# Patient Record
Sex: FEMALE | Race: WHITE | NOT HISPANIC OR LATINO | Employment: FULL TIME | URBAN - METROPOLITAN AREA
[De-identification: names, ages, dates, MRNs, and addresses within clinical notes are randomized per-mention and may not be internally consistent; named-entity substitution may affect disease eponyms.]

---

## 2017-02-28 ENCOUNTER — TELEPHONE (OUTPATIENT)
Dept: PSYCHIATRY | Facility: CLINIC | Age: 27
End: 2017-02-28

## 2017-02-28 ENCOUNTER — MYC MEDICAL ADVICE (OUTPATIENT)
Dept: PSYCHIATRY | Facility: CLINIC | Age: 27
End: 2017-02-28

## 2017-02-28 NOTE — TELEPHONE ENCOUNTER
----- Message from Trinidad Fuchs sent at 2/28/2017 12:09 PM CST -----  Regarding: Wes/Claudette York from [employer] is calling.  Ph: [phone]    The pt would like to fill her Adderall early as she's leaving [facility] tomorrow for a trip. The pharmacy needs authorization to do so.

## 2017-02-28 NOTE — TELEPHONE ENCOUNTER
-See MyChart msg from pt  -Called pharmacy giving authorization to RF Adderall XR today.  -Pt notified via [employer].

## 2017-02-28 NOTE — TELEPHONE ENCOUNTER
Per [place] :   Adderall XR 20 mg daily #30 - last filled 2/7/17  Adderall IR 10 mg daily #30 - last filled 2/2/17    -Called pt to find out when she is leaving out of town.  -No answer at home number and vm is left requesting c/b regarding early med RF.

## 2017-03-27 ENCOUNTER — MYC MEDICAL ADVICE (OUTPATIENT)
Dept: PSYCHIATRY | Facility: CLINIC | Age: 27
End: 2017-03-27

## 2017-03-27 DIAGNOSIS — F98.8 ADD (ATTENTION DEFICIT DISORDER): ICD-10-CM

## 2017-03-27 DIAGNOSIS — F33.1 MAJOR DEPRESSIVE DISORDER, RECURRENT EPISODE, MODERATE (H): ICD-10-CM

## 2017-03-27 NOTE — TELEPHONE ENCOUNTER
Last seen: 12/27/16  RTC: 3 months  Cancel: none  No-show: none  Next appt: not scheduled    Incoming refill from pt via [employer]    Medication requested:   Adderall 10 mg daily #30 - last filled 2/2/17 & 2/28/17 (pt would take last pill on 4/2/17)  Adderall XR 20 mg daily #30 - last filled 2/7/17 & 2/28/17 (pt would take last pill on 4/7/17)    Msg sent to pt regarding scheduling an appt either tomorrow or next Tue with Dr. Wilkins  Will process requests depending on how soon pt can be seen for appt.

## 2017-03-28 RX ORDER — DEXTROAMPHETAMINE SACCHARATE, AMPHETAMINE ASPARTATE MONOHYDRATE, DEXTROAMPHETAMINE SULFATE AND AMPHETAMINE SULFATE 5; 5; 5; 5 MG/1; MG/1; MG/1; MG/1
20 CAPSULE, EXTENDED RELEASE ORAL DAILY
Qty: 30 CAPSULE | Refills: 0 | Status: SHIPPED | OUTPATIENT
Start: 2017-03-28 | End: 2017-05-17

## 2017-03-28 RX ORDER — DEXTROAMPHETAMINE SACCHARATE, AMPHETAMINE ASPARTATE, DEXTROAMPHETAMINE SULFATE AND AMPHETAMINE SULFATE 2.5; 2.5; 2.5; 2.5 MG/1; MG/1; MG/1; MG/1
TABLET ORAL
Qty: 30 TABLET | Refills: 0 | Status: SHIPPED | OUTPATIENT
Start: 2017-03-28 | End: 2017-04-11

## 2017-03-28 NOTE — TELEPHONE ENCOUNTER
Appt scheduled for 4/11/17 at 1:00 pm    As pt would need refill of both meds before then, scripts are printed and placed in Dr. Wilkins's folder for signature.

## 2017-03-28 NOTE — TELEPHONE ENCOUNTER
Scripts signed by Dr. Wilkins  Pt notified via [employer]  Requested pt confirm how she would like to receive scripts

## 2017-04-11 ENCOUNTER — OFFICE VISIT (OUTPATIENT)
Dept: PSYCHIATRY | Facility: CLINIC | Age: 27
End: 2017-04-11

## 2017-04-11 VITALS — HEART RATE: 93 BPM | DIASTOLIC BLOOD PRESSURE: 78 MMHG | SYSTOLIC BLOOD PRESSURE: 138 MMHG

## 2017-04-11 DIAGNOSIS — F98.8 ADD (ATTENTION DEFICIT DISORDER): ICD-10-CM

## 2017-04-11 DIAGNOSIS — F33.1 MAJOR DEPRESSIVE DISORDER, RECURRENT EPISODE, MODERATE (H): ICD-10-CM

## 2017-04-11 DIAGNOSIS — F98.8 ATTENTION DEFICIT DISORDER OF ADULT: Primary | ICD-10-CM

## 2017-04-11 RX ORDER — DULOXETIN HYDROCHLORIDE 60 MG/1
60 CAPSULE, DELAYED RELEASE ORAL DAILY
Qty: 30 CAPSULE | Refills: 3 | Status: SHIPPED | OUTPATIENT
Start: 2017-04-11 | End: 2017-08-03

## 2017-04-11 RX ORDER — DEXTROAMPHETAMINE SACCHARATE, AMPHETAMINE ASPARTATE, DEXTROAMPHETAMINE SULFATE AND AMPHETAMINE SULFATE 5; 5; 5; 5 MG/1; MG/1; MG/1; MG/1
TABLET ORAL
Qty: 30 TABLET | Refills: 0 | Status: SHIPPED | OUTPATIENT
Start: 2017-04-11 | End: 2017-05-17

## 2017-04-11 NOTE — MR AVS SNAPSHOT
After Visit Summary   4/11/2017    Saskia Ortega    MRN: [id number]           Patient Information     Date Of Birth          1990        Visit Information        Provider Department      4/11/2017 1:30 PM Yessica Wilkins MD [facility]        Today's Diagnoses     Attention deficit disorder of adult    -  1    ADD (attention deficit disorder)        Major depressive disorder, recurrent episode, moderate (H)           Follow-ups after your visit        Follow-up notes from your care team     Return in about 4 weeks (around 5/9/2017).      Your next 10 appointments already scheduled     May 16, 2017  1:00 PM CDT   Adult Med Follow UP with Yessica Wilkins MD   [facility] ([facility])    [address] [id number] [phone]              Who to contact     Please call your clinic at [phone] to:    Ask questions about your health    Make or cancel appointments    Discuss your medicines    Learn about your test results    Speak to your doctor   If you have compliments or concerns about an experience at your clinic, or if you wish to file a complaint, please contact [facility] Patient Relations at [phone] or email us at [email or web address]         Additional Information About Your Visit        MyChart Information     [employer] gives you secure access to your electronic health record. If you see a primary care provider, you can also send messages to your care team and make appointments. If you have questions, please call your primary care clinic.  If you do not have a primary care provider, please call [phone] and they will assist you.      [employer] is an electronic gateway that provides easy, online access to your medical records. With [employer], you can request a clinic appointment, read your test results, renew a prescription or communicate with your care team.     To access your  existing account, please contact your [facility] or call [phone] for assistance.        Care EveryWhere ID     This is your Care EveryWhere ID. This could be used by other organizations to access your [place] medical records  [id number]        Your Vitals Were     Pulse Breastfeeding?                93 No           Blood Pressure from Last 3 Encounters:   04/11/17 138/78   11/23/15 112/78   05/20/14 112/86    Weight from Last 3 Encounters:   11/23/15 135 lb (61.2 kg)   05/20/14 120 lb (54.4 kg)   10/29/13 115 lb 8 oz (52.4 kg)              Today, you had the following     No orders found for display         Today's Medication Changes          These changes are accurate as of: 4/11/17 11:59 PM.  If you have any questions, ask your nurse or doctor.               These medicines have changed or have updated prescriptions.        Dose/Directions    * amphetamine-dextroamphetamine 20 MG per 24 hr capsule   Commonly known as:  ADDERALL XR   This may have changed:    - Another medication with the same name was added. Make sure you understand how and when to take each.  - Another medication with the same name was removed. Continue taking this medication, and follow the directions you see here.   Used for:  Major depressive disorder, recurrent episode, moderate (H)   Changed by:  Yessica Wilkins MD        Dose:  20 mg   Take 1 capsule (20 mg) by mouth daily   Quantity:  30 capsule   Refills:  0       * amphetamine-dextroamphetamine 20 MG per tablet   Commonly known as:  ADDERALL   This may have changed:  You were already taking a medication with the same name, and this prescription was added. Make sure you understand how and when to take each.   Used for:  ADD (attention deficit disorder)   Replaces:  amphetamine-dextroamphetamine 10 MG per tablet   Changed by:  Yessica Wilkins MD        Take 1 tab (10 mg) every afternoon.   Quantity:  30 tablet   Refills:  0       *  Notice:  This list has 2 medication(s) that are the same as other medications prescribed for you. Read the directions carefully, and ask your doctor or other care provider to review them with you.      Stop taking these medicines if you haven't already. Please contact your care team if you have questions.     amphetamine-dextroamphetamine 10 MG per tablet   Commonly known as:  ADDERALL   Replaced by:  amphetamine-dextroamphetamine 20 MG per tablet   You also have another medication with the same name that you need to continue taking as instructed.   Stopped by:  Yessica Wilkins MD                Where to get your medicines      These medications were sent to [facility] [id number] [facility] - [place], [place] - [address]  [address], [facility] [address]     Phone:  [phone]     DULoxetine 60 MG EC capsule         Some of these will need a paper prescription and others can be bought over the counter.  Ask your nurse if you have questions.     Bring a paper prescription for each of these medications     amphetamine-dextroamphetamine 20 MG per tablet                Primary Care Provider Office Phone # Fax #    Miriam Kimball [phone]       [facility] [place] [address]  [facility] [address]        Thank you!     Thank you for choosing [facility]  for your care. Our goal is always to provide you with excellent care. Hearing back from our patients is one way we can continue to improve our services. Please take a few minutes to complete the written survey that you may receive in the mail after your visit with us. Thank you!             Your Updated Medication List - Protect others around you: Learn how to safely use, store and throw away your medicines at www.disposemymeds.org.          This list is accurate as of: 4/11/17 11:59 PM.  Always use your most recent med list.                   Brand Name Dispense Instructions for use    * amphetamine-dextroamphetamine 20 MG per 24  hr capsule    ADDERALL XR    30 capsule    Take 1 capsule (20 mg) by mouth daily       * amphetamine-dextroamphetamine 20 MG per tablet    ADDERALL    30 tablet    Take 1 tab (10 mg) every afternoon.       DULoxetine 60 MG EC capsule    CYMBALTA    30 capsule    Take 1 capsule (60 mg) by mouth daily       LORazepam 0.5 MG tablet    ATIVAN    30 tablet    Take 1 tablet by mouth daily as needed for anxiety.       * Notice:  This list has 2 medication(s) that are the same as other medications prescribed for you. Read the directions carefully, and ask your doctor or other care provider to review them with you.

## 2017-04-11 NOTE — PROGRESS NOTES
PSYCHIATRY CLINIC PROGRESS NOTE  30 minute medication management    IDENTIFICATION:  Saskia Ortega is a 26 year old female with previous psychiatric diagnoses of major depressive disorder, recurrent, generalized anxiety disorder, and ADHD.  Patient presents for ongoing psychiatric follow-up and was seen for initial evaluation on 5/04/2012.    SUBJECTIVE:  The patient was last seen in clinic on 12/27/2016 at which time no medication changes were made.  Since the time of the last visit:     Pt reports that she has been doing well since she was last seen.    She states that she was in a wedding in [place] as well as recently on a vacation to [place]. She reports that she plans to begin looking for a job soon.    She statse taht she recently noticed that her feeling that Adderall 10 mg in the middle of the day is not working as effectively.    Discussed whether she has been feeling more anxious. 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    She describes not taking her Adderall regularly over the past month. Discussed possibility that ineffectiveness of 10 mg in the afternoon may be due to lack of XR being at steady state. Discussed option of having her continue on current regiment for a week with her taking it regularly vs. Increased afternoon dose. Opted to increased afternoon dose to 20 mg.    Other than worsened inattention, she reports mood has been stable and that anxiety has been manageable. Denies sleep disturbance.    Symptoms:  Worsened inattention in the afternoon. Denies sleep disruption. Ongoing infrequent low mood, anhedonia, fatigue, and sleep disturbance.  Denies  negative self concept, appetite disturbance, psychomotor slowing or concentration problems.  Denies suicidal ideation.  Ongoing periodic anxiety.   Medication side-effects:  Previously experienced fatigue, weight gain, and loss of libido associated with sertraline as well as fatigue  "and amotivation with higher dose citalopram.  Medical ROS:     Cardiovascular: negative for palpitations, tachycardia  Gastrointestinal: negative for increased appetite; negative for nausea, vomiting, constipation and diarrhea  Neurologic: negative for headaches and cognitive problems  Psychiatric: positive for sleep disturbance, increased stress, feeling anxious, negative for thoughts of self-harm, agitation and sexual difficulties.    MEDICAL TEAM:         - Primary Medical Provider:  unknown  - Therapist: none currently (previously followed by this provider for supportive psychotherapy)    ALLERGIES: NKDA    MEDICATIONS:  Adderall XR 20 mg daily  Adderall IR 10 mg qAfternoon  Duloxetine 60 mg daily   Lorazepam 0.5 mg daily PRN anxiety/sleep    LABS: no new results  VITALS: /78  Pulse 93  Breastfeeding? No    OBJECTIVE:   Alert and oriented.  Well groomed, calm, cooperative with good eye contact.  No problems with speech or psychomotor behavior.  Mood was described as \"good.\" Affect was euthymic with significantly improved brightness, congruent to speech content. Thought process was unremarkable and thought content was congruent to speech content, and devoid of suicidal and homicidal ideation and psychotic thought.  No hallucinations.  Insight was good.  Judgment was intact and adequate for safety.  Patient demonstrates no obvious problems with attention, concentration, language, short or long term memory by observation of conversation.  These were not formally tested.  Fund of knowledge was intact.    ASSESSMENT:    Historical:  Saskia Ortega is a 25 year old  female with history of depression and anxiety who presents for follow-up medication management. She was transitioned from Effexor to Celexa in spring/summer 2013 with good results.  She was previously seen for monthly combination medication management and psychotherapy by her last provider.  She describes obtaining benefit from this and " requested to increase frequency of sessions to every other week to work on processing family issues which have become more apparent after beginning a new relationship.  She continued to find benefit from Adderall for ADD and lorazepam as PRN for anxiety. She took them as prescribed and had no issues with substance abuse.  Pt has a history trials of Effexor and citalopram which were both effective for managing depression but discontinued secondary to side effects.  In addition, trial of Vyvanse was attempted during fall 2014, however, pt did not find it as effective as Adderall for management of ADD sx and so was transitioned back to Adderall.  At January 2015 visit, pt described significant worsening of sx of depression and cross-titration from citalopram to sertraline was initiated.  She tolerated transition well and initially felt mood was well managed at 150 mg daily.    Current:  Today, pt reports feeling that ADHD symptoms have recently been worse in the afternoon with increased distractibility, difficulty staying on task and with intrusive interrupting of people during conversation. After discussing possible contribution of anxiety, opted to increase afternoon dose of IR Adderall to 20 mg. Otherwise feels mood and anxiety are currently well managed.    For the future, may consider increasing dose of duloxetine to 90 mg vs trial of escitalopram and/or bupropion.    The risks, benefits, alternatives and potential adverse effects have been explained and are understood by the patient.  The patient agrees to the plan with the capacity to do so.  The patient knows to call the clinic for any problems or access emergency care if needed. The patient is not pregnant.  She is not abusing substances and shows no evidence for abuse of medication.  Controlled substances are still appropriate and required.  No medical contraindications to treatment.    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   -- Continue duloxetine 60 mg daily.  Refills x 3 months provided today (4/11/2017).  -- Continue lorazepam 0.5 mg daily PRN anxiety/insomnia.  Refills x 1 month provided (12/27/2016).  -- Continue Adderall XR 20 mg daily.  Refills x 3 months (12/27/2016).  Psychotherapy:   Will continue with monthly medication management for the time being.   RTC:  1 months for U  Labs/Monitoring:    -- Recommend pt's weight NOT be checked prior to appointments.  -- Continue to monitor BP while on Cymbalta  Psychotherapy treatment plan review date: not currently indicated.      MITCHEL Wilkins MD

## 2017-04-13 ASSESSMENT — PATIENT HEALTH QUESTIONNAIRE - PHQ9: SUM OF ALL RESPONSES TO PHQ QUESTIONS 1-9: 2

## 2017-05-17 ENCOUNTER — MYC REFILL (OUTPATIENT)
Dept: PSYCHIATRY | Facility: CLINIC | Age: 27
End: 2017-05-17

## 2017-05-17 DIAGNOSIS — F33.1 MAJOR DEPRESSIVE DISORDER, RECURRENT EPISODE, MODERATE (H): ICD-10-CM

## 2017-05-17 DIAGNOSIS — F98.8 ADD (ATTENTION DEFICIT DISORDER): ICD-10-CM

## 2017-05-17 RX ORDER — DEXTROAMPHETAMINE SACCHARATE, AMPHETAMINE ASPARTATE, DEXTROAMPHETAMINE SULFATE AND AMPHETAMINE SULFATE 5; 5; 5; 5 MG/1; MG/1; MG/1; MG/1
TABLET ORAL
Qty: 30 TABLET | Refills: 0 | Status: SHIPPED | OUTPATIENT
Start: 2017-05-17 | End: 2017-06-26

## 2017-05-17 RX ORDER — DEXTROAMPHETAMINE SACCHARATE, AMPHETAMINE ASPARTATE MONOHYDRATE, DEXTROAMPHETAMINE SULFATE AND AMPHETAMINE SULFATE 5; 5; 5; 5 MG/1; MG/1; MG/1; MG/1
20 CAPSULE, EXTENDED RELEASE ORAL DAILY
Qty: 30 CAPSULE | Refills: 0 | Status: SHIPPED | OUTPATIENT
Start: 2017-05-17 | End: 2017-06-26

## 2017-06-23 ENCOUNTER — MYC REFILL (OUTPATIENT)
Dept: PSYCHIATRY | Facility: CLINIC | Age: 27
End: 2017-06-23

## 2017-06-23 DIAGNOSIS — F98.8 ADD (ATTENTION DEFICIT DISORDER): ICD-10-CM

## 2017-06-23 DIAGNOSIS — F33.1 MAJOR DEPRESSIVE DISORDER, RECURRENT EPISODE, MODERATE (H): ICD-10-CM

## 2017-06-26 ENCOUNTER — MYC REFILL (OUTPATIENT)
Dept: PSYCHIATRY | Facility: CLINIC | Age: 27
End: 2017-06-26

## 2017-06-26 DIAGNOSIS — F98.8 ADD (ATTENTION DEFICIT DISORDER): ICD-10-CM

## 2017-06-26 DIAGNOSIS — F33.1 MAJOR DEPRESSIVE DISORDER, RECURRENT EPISODE, MODERATE (H): ICD-10-CM

## 2017-06-26 DIAGNOSIS — F90.8 ATTENTION DEFICIT HYPERACTIVITY DISORDER (ADHD), OTHER TYPE: Primary | ICD-10-CM

## 2017-06-26 RX ORDER — DEXTROAMPHETAMINE SACCHARATE, AMPHETAMINE ASPARTATE MONOHYDRATE, DEXTROAMPHETAMINE SULFATE AND AMPHETAMINE SULFATE 5; 5; 5; 5 MG/1; MG/1; MG/1; MG/1
20 CAPSULE, EXTENDED RELEASE ORAL EVERY MORNING
Qty: 30 CAPSULE | Refills: 0 | Status: SHIPPED | OUTPATIENT
Start: 2017-06-26 | End: 2017-12-15

## 2017-06-26 RX ORDER — DEXTROAMPHETAMINE SACCHARATE, AMPHETAMINE ASPARTATE, DEXTROAMPHETAMINE SULFATE AND AMPHETAMINE SULFATE 5; 5; 5; 5 MG/1; MG/1; MG/1; MG/1
TABLET ORAL
Qty: 30 TABLET | Refills: 0 | Status: SHIPPED | OUTPATIENT
Start: 2017-06-26 | End: 2017-12-15

## 2017-06-26 RX ORDER — DEXTROAMPHETAMINE SACCHARATE, AMPHETAMINE ASPARTATE MONOHYDRATE, DEXTROAMPHETAMINE SULFATE AND AMPHETAMINE SULFATE 5; 5; 5; 5 MG/1; MG/1; MG/1; MG/1
20 CAPSULE, EXTENDED RELEASE ORAL DAILY
Qty: 30 CAPSULE | Refills: 0 | OUTPATIENT
Start: 2017-06-26

## 2017-06-26 RX ORDER — DEXTROAMPHETAMINE SACCHARATE, AMPHETAMINE ASPARTATE, DEXTROAMPHETAMINE SULFATE AND AMPHETAMINE SULFATE 5; 5; 5; 5 MG/1; MG/1; MG/1; MG/1
TABLET ORAL
Qty: 30 TABLET | Refills: 0 | OUTPATIENT
Start: 2017-06-26

## 2017-06-26 NOTE — TELEPHONE ENCOUNTER
----- Message from Tyrone Mixon CMA sent at 6/26/2017  9:02 AM CDT -----  Regarding: refill, Dr Wilkins pt  Patient needs a refill on both   amphetamine-dextroamphetamine (ADDERALL XR) 20 MG per 24 hr capsule  AND  amphetamine-dextroamphetamine (ADDERALL) 20 MG per tablet    Pharmacy:    [facility] [id number] [facility] - [place], [place] - [address]     RTC date: 7/11/17

## 2017-06-26 NOTE — TELEPHONE ENCOUNTER
Message from [place]:  Dana Armijo RN Fri Jun 23, 2017 8:21 AM        ----- Message -----   From: Saskia Ortega   Sent: 6/23/2017 4:57 AM   To: [facility]iat[facility]  Subject: Medication Renewal Request     Original authorizing provider: MD Saskia Mcmanus would like a refill of the following medications:  amphetamine-dextroamphetamine (ADDERALL XR) 20 MG per 24 hr capsule [Yessica Wilkins MD]  amphetamine-dextroamphetamine (ADDERALL) 20 MG per tablet [Yessica Wilkins MD]    Preferred pharmacy: [facility] [id number] [place] [address]    Comment:

## 2017-07-11 ENCOUNTER — OFFICE VISIT (OUTPATIENT)
Dept: PSYCHIATRY | Facility: CLINIC | Age: 27
End: 2017-07-11

## 2017-07-11 VITALS — HEART RATE: 89 BPM | DIASTOLIC BLOOD PRESSURE: 79 MMHG | SYSTOLIC BLOOD PRESSURE: 129 MMHG | HEIGHT: 66 IN

## 2017-07-11 DIAGNOSIS — F33.1 MAJOR DEPRESSIVE DISORDER, RECURRENT EPISODE, MODERATE (H): ICD-10-CM

## 2017-07-11 DIAGNOSIS — F90.0 ATTENTION DEFICIT HYPERACTIVITY DISORDER (ADHD), PREDOMINANTLY INATTENTIVE TYPE: ICD-10-CM

## 2017-07-11 DIAGNOSIS — F90.0 ATTENTION-DEFICIT HYPERACTIVITY DISORDER, PREDOMINANTLY INATTENTIVE TYPE: Primary | ICD-10-CM

## 2017-07-11 RX ORDER — DEXTROAMPHETAMINE SACCHARATE, AMPHETAMINE ASPARTATE MONOHYDRATE, DEXTROAMPHETAMINE SULFATE AND AMPHETAMINE SULFATE 5; 5; 5; 5 MG/1; MG/1; MG/1; MG/1
CAPSULE, EXTENDED RELEASE ORAL
Qty: 30 CAPSULE | Refills: 0 | Status: SHIPPED | OUTPATIENT
Start: 2017-07-11 | End: 2017-07-11

## 2017-07-11 RX ORDER — DEXTROAMPHETAMINE SACCHARATE, AMPHETAMINE ASPARTATE, DEXTROAMPHETAMINE SULFATE AND AMPHETAMINE SULFATE 5; 5; 5; 5 MG/1; MG/1; MG/1; MG/1
TABLET ORAL
Qty: 30 TABLET | Refills: 0 | Status: SHIPPED | OUTPATIENT
Start: 2017-09-09 | End: 2018-07-10

## 2017-07-11 RX ORDER — DEXTROAMPHETAMINE SACCHARATE, AMPHETAMINE ASPARTATE, DEXTROAMPHETAMINE SULFATE AND AMPHETAMINE SULFATE 5; 5; 5; 5 MG/1; MG/1; MG/1; MG/1
TABLET ORAL
Qty: 30 TABLET | Refills: 0 | Status: SHIPPED | OUTPATIENT
Start: 2017-08-10 | End: 2017-12-15

## 2017-07-11 RX ORDER — DEXTROAMPHETAMINE SACCHARATE, AMPHETAMINE ASPARTATE MONOHYDRATE, DEXTROAMPHETAMINE SULFATE AND AMPHETAMINE SULFATE 5; 5; 5; 5 MG/1; MG/1; MG/1; MG/1
CAPSULE, EXTENDED RELEASE ORAL
Qty: 30 CAPSULE | Refills: 0 | Status: SHIPPED | OUTPATIENT
Start: 2017-08-10 | End: 2017-12-15

## 2017-07-11 RX ORDER — DEXTROAMPHETAMINE SACCHARATE, AMPHETAMINE ASPARTATE, DEXTROAMPHETAMINE SULFATE AND AMPHETAMINE SULFATE 5; 5; 5; 5 MG/1; MG/1; MG/1; MG/1
TABLET ORAL
Qty: 30 TABLET | Refills: 0 | Status: SHIPPED | OUTPATIENT
Start: 2017-07-11 | End: 2017-07-11

## 2017-07-11 RX ORDER — DEXTROAMPHETAMINE SACCHARATE, AMPHETAMINE ASPARTATE, DEXTROAMPHETAMINE SULFATE AND AMPHETAMINE SULFATE 5; 5; 5; 5 MG/1; MG/1; MG/1; MG/1
TABLET ORAL
Qty: 30 TABLET | Refills: 0 | Status: SHIPPED | OUTPATIENT
Start: 2017-07-11 | End: 2017-12-15

## 2017-07-11 RX ORDER — DEXTROAMPHETAMINE SACCHARATE, AMPHETAMINE ASPARTATE MONOHYDRATE, DEXTROAMPHETAMINE SULFATE AND AMPHETAMINE SULFATE 5; 5; 5; 5 MG/1; MG/1; MG/1; MG/1
CAPSULE, EXTENDED RELEASE ORAL
Qty: 30 CAPSULE | Refills: 0 | Status: SHIPPED | OUTPATIENT
Start: 2017-07-11 | End: 2017-12-15

## 2017-07-11 RX ORDER — DEXTROAMPHETAMINE SACCHARATE, AMPHETAMINE ASPARTATE MONOHYDRATE, DEXTROAMPHETAMINE SULFATE AND AMPHETAMINE SULFATE 5; 5; 5; 5 MG/1; MG/1; MG/1; MG/1
CAPSULE, EXTENDED RELEASE ORAL
Qty: 30 CAPSULE | Refills: 0 | Status: SHIPPED | OUTPATIENT
Start: 2017-09-09 | End: 2017-12-15

## 2017-07-11 RX ORDER — DEXTROAMPHETAMINE SACCHARATE, AMPHETAMINE ASPARTATE, DEXTROAMPHETAMINE SULFATE AND AMPHETAMINE SULFATE 5; 5; 5; 5 MG/1; MG/1; MG/1; MG/1
TABLET ORAL
Qty: 30 TABLET | Refills: 0 | Status: SHIPPED | OUTPATIENT
Start: 2017-09-09 | End: 2017-07-11

## 2017-07-11 NOTE — MR AVS SNAPSHOT
After Visit Summary   7/11/2017    Saskia Ortega    MRN: [id number]           Patient Information     Date Of Birth          1990        Visit Information        Provider Department      7/11/2017 3:30 PM Yessica Wilkins MD [facility]        Today's Diagnoses     Attention-deficit hyperactivity disorder, predominantly inattentive type    -  1       Follow-ups after your visit        Follow-up notes from your care team     Return in 3 months (on 10/11/2017) for ADHD MED RECHECK (3 MONTHS).      Your next 10 appointments already scheduled     Oct 17, 2017  1:00 PM CDT   Adult Med Follow UP with Yessica Wilkins MD   [facility] ([facility])    [address] [address] [phone]              Who to contact     Please call your clinic at [phone] to:    Ask questions about your health    Make or cancel appointments    Discuss your medicines    Learn about your test results    Speak to your doctor   If you have compliments or concerns about an experience at your clinic, or if you wish to file a complaint, please contact [facility] Patient Relations at [phone] or email us at [email or web address]         Additional Information About Your Visit        MyChart Information     [employer] gives you secure access to your electronic health record. If you see a primary care provider, you can also send messages to your care team and make appointments. If you have questions, please call your primary care clinic.  If you do not have a primary care provider, please call [phone] and they will assist you.      [employer] is an electronic gateway that provides easy, online access to your medical records. With [employer], you can request a clinic appointment, read your test results, renew a prescription or communicate with your care team.     To access your existing account, please contact your [place]  [facility] [place] [facility] or call [phone] for assistance.        Care EveryWhere ID     This is your Care EveryWhere ID. This could be used by other organizations to access your [place] medical records  [id number]        Your Vitals Were     Pulse Height Breastfeeding?             89 5' 6\" (167.6 cm) No          Blood Pressure from Last 3 Encounters:   07/11/17 129/79   04/11/17 138/78   11/23/15 112/78    Weight from Last 3 Encounters:   11/23/15 135 lb (61.2 kg)   05/20/14 120 lb (54.4 kg)   10/29/13 115 lb 8 oz (52.4 kg)              Today, you had the following     No orders found for display         Today's Medication Changes          These changes are accurate as of: 7/11/17  4:12 PM.  If you have any questions, ask your nurse or doctor.               These medicines have changed or have updated prescriptions.        Dose/Directions    * amphetamine-dextroamphetamine 20 MG per 24 hr capsule   Commonly known as:  ADDERALL XR   This may have changed:  Another medication with the same name was added. Make sure you understand how and when to take each.   Used for:  Major depressive disorder, recurrent episode, moderate (H), Attention deficit hyperactivity disorder (ADHD), other type   Changed by:  Yessica Wilkins MD        Dose:  20 mg   Take 1 capsule (20 mg) by mouth every morning   Quantity:  30 capsule   Refills:  0       * amphetamine-dextroamphetamine 20 MG per tablet   Commonly known as:  ADDERALL   This may have changed:  Another medication with the same name was added. Make sure you understand how and when to take each.   Used for:  Major depressive disorder, recurrent episode, moderate (H), Attention deficit hyperactivity disorder (ADHD), other type   Changed by:  Yessica Wilkins MD        Take 1 tab (20 mg) every afternoon.   Quantity:  30 tablet   Refills:  0       * amphetamine-dextroamphetamine 20 MG per 24 hr capsule   Commonly known as:  ADDERALL XR   This may have " changed:  You were already taking a medication with the same name, and this prescription was added. Make sure you understand how and when to take each.   Used for:  Attention-deficit hyperactivity disorder, predominantly inattentive type   Changed by:  Yessica Wilkins MD        Take 1 tab (20 mg) every morning.   Quantity:  30 capsule   Refills:  0       * amphetamine-dextroamphetamine 20 MG per 24 hr capsule   Commonly known as:  ADDERALL XR   This may have changed:  You were already taking a medication with the same name, and this prescription was added. Make sure you understand how and when to take each.   Used for:  Attention-deficit hyperactivity disorder, predominantly inattentive type   Changed by:  Yessica Wilkins MD        Take 1 tab (20 mg) every morning.   Quantity:  30 capsule   Refills:  0       * amphetamine-dextroamphetamine 20 MG per 24 hr capsule   Commonly known as:  ADDERALL XR   This may have changed:  You were already taking a medication with the same name, and this prescription was added. Make sure you understand how and when to take each.   Used for:  Attention-deficit hyperactivity disorder, predominantly inattentive type   Changed by:  Yessica Wilkins MD        Take 1 tab (20 mg) every morning.   Quantity:  30 capsule   Refills:  0       * amphetamine-dextroamphetamine 20 MG per tablet   Commonly known as:  ADDERALL   This may have changed:  You were already taking a medication with the same name, and this prescription was added. Make sure you understand how and when to take each.   Used for:  Attention-deficit hyperactivity disorder, predominantly inattentive type   Changed by:  Yessica Wilkins MD        Take 1 tab (20 mg) every afternoon.   Quantity:  30 tablet   Refills:  0       * amphetamine-dextroamphetamine 20 MG per tablet   Commonly known as:  ADDERALL   This may have changed:  You were already taking a medication with the same name, and this  prescription was added. Make sure you understand how and when to take each.   Used for:  Attention-deficit hyperactivity disorder, predominantly inattentive type   Changed by:  Yessica Wilkins MD        Take 1 tab (20 mg) every afternoon.   Quantity:  30 tablet   Refills:  0       * amphetamine-dextroamphetamine 20 MG per tablet   Commonly known as:  ADDERALL   This may have changed:  You were already taking a medication with the same name, and this prescription was added. Make sure you understand how and when to take each.   Used for:  Attention-deficit hyperactivity disorder, predominantly inattentive type   Changed by:  Yessica Wilkins MD        Take 1 tab (20 mg) every afternoon.   Quantity:  30 tablet   Refills:  0       * Notice:  This list has 8 medication(s) that are the same as other medications prescribed for you. Read the directions carefully, and ask your doctor or other care provider to review them with you.         Where to get your medicines      Some of these will need a paper prescription and others can be bought over the counter.  Ask your nurse if you have questions.     Bring a paper prescription for each of these medications     amphetamine-dextroamphetamine 20 MG per 24 hr capsule    amphetamine-dextroamphetamine 20 MG per 24 hr capsule    amphetamine-dextroamphetamine 20 MG per 24 hr capsule    amphetamine-dextroamphetamine 20 MG per tablet    amphetamine-dextroamphetamine 20 MG per tablet    amphetamine-dextroamphetamine 20 MG per tablet                Primary Care Provider Office Phone # Fax #    Miriam Kimball [phone]       [facility] [address] [facility] [address]  [facility] [address]        Equal Access to Services     [facility] AH: Hadii tal mariscalo Sochristiana, waaxda luqkathy, qaybta kaalmada cayla rowe. So [facility] [phone].    ATENCIÓN: Si habla español, tiene a espitia disposición servicios  fide de asistencia lingüística. Deep case [phone].    We comply with applicable federal civil rights laws and [place] laws. We do not discriminate on the basis of race, color, national origin, age, disability sex, sexual orientation or gender identity.            Thank you!     Thank you for choosing [facility]  for your care. Our goal is always to provide you with excellent care. Hearing back from our patients is one way we can continue to improve our services. Please take a few minutes to complete the written survey that you may receive in the mail after your visit with us. Thank you!             Your Updated Medication List - Protect others around you: Learn how to safely use, store and throw away your medicines at www.disposemymeds.org.          This list is accurate as of: 7/11/17  4:12 PM.  Always use your most recent med list.                   Brand Name Dispense Instructions for use Diagnosis    * amphetamine-dextroamphetamine 20 MG per 24 hr capsule    ADDERALL XR    30 capsule    Take 1 capsule (20 mg) by mouth every morning    Major depressive disorder, recurrent episode, moderate (H), Attention deficit hyperactivity disorder (ADHD), other type       * amphetamine-dextroamphetamine 20 MG per tablet    ADDERALL    30 tablet    Take 1 tab (20 mg) every afternoon.    Major depressive disorder, recurrent episode, moderate (H), Attention deficit hyperactivity disorder (ADHD), other type       * amphetamine-dextroamphetamine 20 MG per 24 hr capsule    ADDERALL XR    30 capsule    Take 1 tab (20 mg) every morning.    Attention-deficit hyperactivity disorder, predominantly inattentive type       * amphetamine-dextroamphetamine 20 MG per 24 hr capsule    ADDERALL XR    30 capsule    Take 1 tab (20 mg) every morning.    Attention-deficit hyperactivity disorder, predominantly inattentive type       * amphetamine-dextroamphetamine 20 MG per 24 hr capsule    ADDERALL XR    30 capsule    Take 1 tab (20  mg) every morning.    Attention-deficit hyperactivity disorder, predominantly inattentive type       * amphetamine-dextroamphetamine 20 MG per tablet    ADDERALL    30 tablet    Take 1 tab (20 mg) every afternoon.    Attention-deficit hyperactivity disorder, predominantly inattentive type       * amphetamine-dextroamphetamine 20 MG per tablet    ADDERALL    30 tablet    Take 1 tab (20 mg) every afternoon.    Attention-deficit hyperactivity disorder, predominantly inattentive type       * amphetamine-dextroamphetamine 20 MG per tablet    ADDERALL    30 tablet    Take 1 tab (20 mg) every afternoon.    Attention-deficit hyperactivity disorder, predominantly inattentive type       DULoxetine 60 MG EC capsule    CYMBALTA    30 capsule    Take 1 capsule (60 mg) by mouth daily    Major depressive disorder, recurrent episode, moderate (H)       LORazepam 0.5 MG tablet    ATIVAN    30 tablet    Take 1 tablet by mouth daily as needed for anxiety.    Major depressive disorder, recurrent episode, moderate (H)       * Notice:  This list has 8 medication(s) that are the same as other medications prescribed for you. Read the directions carefully, and ask your doctor or other care provider to review them with you.

## 2017-07-11 NOTE — PROGRESS NOTES
PSYCHIATRY CLINIC PROGRESS NOTE  30 minute medication management    IDENTIFICATION:  Saskia Ortega is a 26 year old female with previous psychiatric diagnoses of major depressive disorder, recurrent, generalized anxiety disorder, and ADHD.  Patient presents for ongoing psychiatric follow-up and was seen for initial evaluation on 5/04/2012.    SUBJECTIVE:  The patient was last seen in clinic on 12/27/2016 at which time no medication changes were made.  Since the time of the last visit:     Pt reports that she has been doing well since she was last seen.    She reports that she has an interview with an [employer] firm that she is excited about.    She states that she is currently trying to be selective about where she will accept works. 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    Reports that she has started taking her Adderall every day.    Expresses concern thatshe is currently depressed. Attributes this to not having regular work. Has plans to dedicate more time to finding a job that she finds fulfilling.    Although she expresses concern about low mood, she declines to change medications as she feels that low mood is attributable to psychosocial situtation.    Overall, relationship with boyfriend is good.    Denies other side effects to medication. Reports she is sleeping well.    Symptoms:  Denies inattention in the afternoon. Denies sleep disruption. Ongoing infrequent low mood, anhedonia, fatigue, and sleep disturbance.  Denies  negative self concept, appetite disturbance, psychomotor slowing or concentration problems.  Denies suicidal ideation.  Ongoing periodic anxiety.   Medication side-effects:  Previously experienced fatigue, weight gain, and loss of libido associated with sertraline as well as fatigue and amotivation with higher dose citalopram.  Medical ROS:     Cardiovascular: negative for palpitations,  "tachycardia  Gastrointestinal: negative for increased appetite; negative for nausea, vomiting, constipation and diarrhea  Neurologic: negative for headaches and cognitive problems  Psychiatric: positive for sleep disturbance, increased stress, feeling anxious, negative for thoughts of self-harm, agitation and sexual difficulties.    MEDICAL TEAM:         - Primary Medical Provider:  unknown  - Therapist: none currently (previously followed by this provider for supportive psychotherapy)    ALLERGIES: NKDA    MEDICATIONS:  Adderall XR 20 mg daily  Adderall IR 10 mg qAfternoon  Duloxetine 60 mg daily   Lorazepam 0.5 mg daily PRN anxiety/sleep    LABS: no new results  VITALS: /79 (BP Location: Left arm, Patient Position: Chair, Cuff Size: Adult Regular)  Pulse 89  Ht 5' 6\" (167.6 cm)  Breastfeeding? No    OBJECTIVE:   Alert and oriented.  Well groomed, calm, cooperative with good eye contact.  No problems with speech or psychomotor behavior.  Mood was described as \"good.\" Affect was euthymic with significantly improved brightness, congruent to speech content. Thought process was unremarkable and thought content was congruent to speech content, and devoid of suicidal and homicidal ideation and psychotic thought.  No hallucinations.  Insight was good.  Judgment was intact and adequate for safety.  Patient demonstrates no obvious problems with attention, concentration, language, short or long term memory by observation of conversation.  These were not formally tested.  Fund of knowledge was intact.    ASSESSMENT:    Historical:  Saskia Ortega is a 25 year old  female with history of depression and anxiety who presents for follow-up medication management. She was transitioned from Effexor to Celexa in spring/summer 2013 with good results.  She was previously seen for monthly combination medication management and psychotherapy by her last provider.  She describes obtaining benefit from this and requested to " increase frequency of sessions to every other week to work on processing family issues which have become more apparent after beginning a new relationship.  She continued to find benefit from Adderall for ADD and lorazepam as PRN for anxiety. She took them as prescribed and had no issues with substance abuse.  Pt has a history trials of Effexor and citalopram which were both effective for managing depression but discontinued secondary to side effects.  In addition, trial of Vyvanse was attempted during fall 2014, however, pt did not find it as effective as Adderall for management of ADD sx and so was transitioned back to Adderall.  At January 2015 visit, pt described significant worsening of sx of depression and cross-titration from citalopram to sertraline was initiated.  She tolerated transition well and initially felt mood was well managed at 150 mg daily.    Current:  Today, pt reports feeling that ADHD symptoms have recently been worse in the afternoon with increased distractibility, difficulty staying on task and with intrusive interrupting of people during conversation. After discussing possible contribution of anxiety, opted to increase afternoon dose of IR Adderall to 20 mg. Otherwise feels mood and anxiety are currently well managed.    For the future, may consider increasing dose of duloxetine to 90 mg vs trial of escitalopram and/or bupropion.    The risks, benefits, alternatives and potential adverse effects have been explained and are understood by the patient.  The patient agrees to the plan with the capacity to do so.  The patient knows to call the clinic for any problems or access emergency care if needed. The patient is not pregnant.  She is not abusing substances and shows no evidence for abuse of medication.  Controlled substances are still appropriate and required.  No medical contraindications to treatment.    DMS-5 DIAGNOSES:  Major depressive disorder, recurrent, moderate, in full remission  (F33.42)  Generalized anxiety disorder (F41.1)  Attention deficit disorder, predominantly inattentive presentation, mild (F90.0)  Anorexia Nervosa, mild, in partial remission (F50.01)     PLAN:  Medications:   -- Continue Adderall IR 20 mg qAfternoon.   -- Continue duloxetine 60 mg daily.  Refills x 3 months provided today (4/11/2017).  -- Continue lorazepam 0.5 mg daily PRN anxiety/insomnia.  Refills x 1 month provided (12/27/2016).  -- Continue Adderall XR 20 mg daily.  Refills x 3 months (12/27/2016).  Psychotherapy:   Will continue with monthly medication management for the time being.   RTC:  1 months for MFU  Labs/Monitoring:    -- Recommend pt's weight NOT be checked prior to appointments.  -- Continue to monitor BP while on Cymbalta  Psychotherapy treatment plan review date: not currently indicated.      MITCHEL Wilkins MD

## 2017-07-12 ASSESSMENT — PATIENT HEALTH QUESTIONNAIRE - PHQ9: SUM OF ALL RESPONSES TO PHQ QUESTIONS 1-9: 2

## 2017-08-03 ENCOUNTER — TELEPHONE (OUTPATIENT)
Dept: NEUROLOGY | Facility: CLINIC | Age: 27
End: 2017-08-03

## 2017-08-03 DIAGNOSIS — F33.1 MAJOR DEPRESSIVE DISORDER, RECURRENT EPISODE, MODERATE (H): ICD-10-CM

## 2017-08-03 RX ORDER — DULOXETIN HYDROCHLORIDE 60 MG/1
60 CAPSULE, DELAYED RELEASE ORAL DAILY
Qty: 90 CAPSULE | Refills: 0 | Status: SHIPPED | OUTPATIENT
Start: 2017-08-03 | End: 2017-10-31

## 2017-08-03 NOTE — TELEPHONE ENCOUNTER
----- Message from Tyrone Mixon CMA sent at 8/3/2017  8:09 AM CDT -----  Regarding: refill, Dr Wilkins pt  Patient needs a refill on DULoxetine (CYMBALTA) 60 MG EC capsule. Needs 90 days supply with refills. Changin to 90 day supply due to ins reasons.     Pharmacy: [id number] - [place], [place] - [address]      RTC date: 10/17/17, appt might be change due to provider being out

## 2017-10-31 ENCOUNTER — MYC REFILL (OUTPATIENT)
Dept: PSYCHIATRY | Facility: CLINIC | Age: 27
End: 2017-10-31

## 2017-10-31 DIAGNOSIS — F33.1 MAJOR DEPRESSIVE DISORDER, RECURRENT EPISODE, MODERATE (H): ICD-10-CM

## 2017-11-01 RX ORDER — DULOXETIN HYDROCHLORIDE 60 MG/1
60 CAPSULE, DELAYED RELEASE ORAL DAILY
Qty: 90 CAPSULE | Refills: 1 | Status: SHIPPED | OUTPATIENT
Start: 2017-11-01 | End: 2018-03-27

## 2017-11-12 ENCOUNTER — HEALTH MAINTENANCE LETTER (OUTPATIENT)
Age: 27
End: 2017-11-12

## 2017-12-12 ENCOUNTER — OFFICE VISIT (OUTPATIENT)
Dept: PSYCHIATRY | Facility: CLINIC | Age: 27
End: 2017-12-12

## 2017-12-12 VITALS — DIASTOLIC BLOOD PRESSURE: 76 MMHG | HEIGHT: 67 IN | HEART RATE: 89 BPM | SYSTOLIC BLOOD PRESSURE: 126 MMHG

## 2017-12-12 DIAGNOSIS — F33.1 MAJOR DEPRESSIVE DISORDER, RECURRENT EPISODE, MODERATE (H): Primary | ICD-10-CM

## 2017-12-12 DIAGNOSIS — F90.0 ATTENTION-DEFICIT HYPERACTIVITY DISORDER, PREDOMINANTLY INATTENTIVE TYPE: ICD-10-CM

## 2017-12-12 RX ORDER — DEXTROAMPHETAMINE SACCHARATE, AMPHETAMINE ASPARTATE MONOHYDRATE, DEXTROAMPHETAMINE SULFATE AND AMPHETAMINE SULFATE 5; 5; 5; 5 MG/1; MG/1; MG/1; MG/1
20 CAPSULE, EXTENDED RELEASE ORAL DAILY
Qty: 30 CAPSULE | Refills: 0 | Status: SHIPPED | OUTPATIENT
Start: 2018-02-12 | End: 2017-12-15

## 2017-12-12 RX ORDER — DEXTROAMPHETAMINE SACCHARATE, AMPHETAMINE ASPARTATE MONOHYDRATE, DEXTROAMPHETAMINE SULFATE AND AMPHETAMINE SULFATE 5; 5; 5; 5 MG/1; MG/1; MG/1; MG/1
20 CAPSULE, EXTENDED RELEASE ORAL DAILY
Qty: 30 CAPSULE | Refills: 0 | Status: SHIPPED | OUTPATIENT
Start: 2017-12-12 | End: 2018-01-11

## 2017-12-12 RX ORDER — DEXTROAMPHETAMINE SACCHARATE, AMPHETAMINE ASPARTATE, DEXTROAMPHETAMINE SULFATE AND AMPHETAMINE SULFATE 2.5; 2.5; 2.5; 2.5 MG/1; MG/1; MG/1; MG/1
10 TABLET ORAL DAILY
Qty: 30 TABLET | Refills: 0 | Status: SHIPPED | OUTPATIENT
Start: 2017-12-12 | End: 2017-12-15

## 2017-12-12 RX ORDER — DEXTROAMPHETAMINE SACCHARATE, AMPHETAMINE ASPARTATE, DEXTROAMPHETAMINE SULFATE AND AMPHETAMINE SULFATE 2.5; 2.5; 2.5; 2.5 MG/1; MG/1; MG/1; MG/1
10 TABLET ORAL DAILY
Qty: 30 TABLET | Refills: 0 | Status: SHIPPED | OUTPATIENT
Start: 2018-01-12 | End: 2017-12-15

## 2017-12-12 RX ORDER — DEXTROAMPHETAMINE SACCHARATE, AMPHETAMINE ASPARTATE, DEXTROAMPHETAMINE SULFATE AND AMPHETAMINE SULFATE 2.5; 2.5; 2.5; 2.5 MG/1; MG/1; MG/1; MG/1
10 TABLET ORAL DAILY
Qty: 30 TABLET | Refills: 0 | Status: SHIPPED | OUTPATIENT
Start: 2018-02-12 | End: 2017-12-15

## 2017-12-12 RX ORDER — DEXTROAMPHETAMINE SACCHARATE, AMPHETAMINE ASPARTATE MONOHYDRATE, DEXTROAMPHETAMINE SULFATE AND AMPHETAMINE SULFATE 5; 5; 5; 5 MG/1; MG/1; MG/1; MG/1
20 CAPSULE, EXTENDED RELEASE ORAL DAILY
Qty: 30 CAPSULE | Refills: 0 | Status: SHIPPED | OUTPATIENT
Start: 2018-01-12 | End: 2017-12-15

## 2017-12-12 ASSESSMENT — PATIENT HEALTH QUESTIONNAIRE - PHQ9: SUM OF ALL RESPONSES TO PHQ QUESTIONS 1-9: 2

## 2017-12-12 NOTE — MR AVS SNAPSHOT
After Visit Summary   12/12/2017    Saskia Ortega    MRN: [id number]           Patient Information     Date Of Birth          1990        Visit Information        Provider Department      12/12/2017 4:00 PM Yessica Wilkins MD [facility]        Today's Diagnoses     Major depressive disorder, recurrent episode, moderate (H)    -  1    Attention-deficit hyperactivity disorder, predominantly inattentive type           Follow-ups after your visit        Your next 10 appointments already scheduled     Jan 16, 2018  4:30 PM [facility]UST   Adult Med Follow UP with Yessica Wilkins MD   [facility] ([facility])    [address]  [place] [address] [phone]              Who to contact     Please call your clinic at [phone] to:    Ask questions about your health    Make or cancel appointments    Discuss your medicines    Learn about your test results    Speak to your doctor   If you have compliments or concerns about an experience at your clinic, or if you wish to file a complaint, please contact [facility] Patient Relations at [phone] or email us at [email or web address]         Additional Information About Your Visit        MyChart Information     [employer] gives you secure access to your electronic health record. If you see a primary care provider, you can also send messages to your care team and make appointments. If you have questions, please call your primary care clinic.  If you do not have a primary care provider, please call [phone] and they will assist you.      [employer] is an electronic gateway that provides easy, online access to your medical records. With [employer], you can request a clinic appointment, read your test results, renew a prescription or communicate with your care team.     To access your existing account, please contact your [facility] or call  "[phone] for assistance.        Care EveryWhere ID     This is your Care EveryWhere ID. This could be used by other organizations to access your [place] medical records  [id number]        Your Vitals Were     Pulse Height                89 1.689 m (5' 6.5\")           Blood Pressure from Last 3 Encounters:   12/22/17 110/62   12/15/17 110/78   12/12/17 126/76    Weight from Last 3 Encounters:   12/22/17 66.7 kg (147 lb)   12/15/17 67.6 kg (149 lb)   11/23/15 61.2 kg (135 lb)              Today, you had the following     No orders found for display         Today's Medication Changes          These changes are accurate as of: 12/12/17 11:59 PM.  If you have any questions, ask your nurse or doctor.               Start taking these medicines.        Dose/Directions    LORazepam 0.5 MG tablet   Commonly known as:  ATIVAN   Used for:  Major depressive disorder, recurrent episode, moderate (H)   Started by:  Yessica Wilkins MD        Take 1 tablet by mouth daily as needed for anxiety.   Quantity:  30 tablet   Refills:  2         These medicines have changed or have updated prescriptions.        Dose/Directions    * amphetamine-dextroamphetamine 20 MG per tablet   Commonly known as:  ADDERALL   This may have changed:  Another medication with the same name was added. Make sure you understand how and when to take each.   Used for:  Attention-deficit hyperactivity disorder, predominantly inattentive type   Changed by:  Yessica Wilkins MD        Take 1 tab (20 mg) every afternoon.   Quantity:  30 tablet   Refills:  0       * amphetamine-dextroamphetamine 20 MG per 24 hr capsule   Commonly known as:  ADDERALL XR   This may have changed:  You were already taking a medication with the same name, and this prescription was added. Make sure you understand how and when to take each.   Used for:  Major depressive disorder, recurrent episode, moderate (H)   Changed by:  Yessica Wilkins MD        Dose:  " 20 mg   Take 1 capsule (20 mg) by mouth daily   Quantity:  30 capsule   Refills:  0       * amphetamine-dextroamphetamine 10 MG per tablet   Commonly known as:  ADDERALL   This may have changed:  You were already taking a medication with the same name, and this prescription was added. Make sure you understand how and when to take each.   Used for:  Major depressive disorder, recurrent episode, moderate (H)   Changed by:  Yessica Wilkins MD        Dose:  10 mg   Take 1 tablet (10 mg) by mouth daily   Quantity:  30 tablet   Refills:  0       * amphetamine-dextroamphetamine 20 MG per 24 hr capsule   Commonly known as:  ADDERALL XR   This may have changed:  You were already taking a medication with the same name, and this prescription was added. Make sure you understand how and when to take each.   Used for:  Major depressive disorder, recurrent episode, moderate (H)   Changed by:  Yessica Wilkins MD        Dose:  20 mg   Take 1 capsule (20 mg) by mouth daily   Quantity:  30 capsule   Refills:  0       * amphetamine-dextroamphetamine 10 MG per tablet   Commonly known as:  ADDERALL   This may have changed:  You were already taking a medication with the same name, and this prescription was added. Make sure you understand how and when to take each.   Used for:  Major depressive disorder, recurrent episode, moderate (H)   Changed by:  Yessica Wilkins MD        Dose:  10 mg   Take 1 tablet (10 mg) by mouth daily   Quantity:  30 tablet   Refills:  0       * amphetamine-dextroamphetamine 20 MG per 24 hr capsule   Commonly known as:  ADDERALL XR   This may have changed:  You were already taking a medication with the same name, and this prescription was added. Make sure you understand how and when to take each.   Used for:  Major depressive disorder, recurrent episode, moderate (H)   Changed by:  Yessica Wilkins MD        Dose:  20 mg   Start taking on:  2/12/2018   Take 1 capsule (20 mg) by  mouth daily   Quantity:  30 capsule   Refills:  0       * amphetamine-dextroamphetamine 10 MG per tablet   Commonly known as:  ADDERALL   This may have changed:  You were already taking a medication with the same name, and this prescription was added. Make sure you understand how and when to take each.   Used for:  Major depressive disorder, recurrent episode, moderate (H)   Changed by:  Yessica Wilkins MD        Dose:  10 mg   Start taking on:  2/12/2018   Take 1 tablet (10 mg) by mouth daily   Quantity:  30 tablet   Refills:  0       * Notice:  This list has 7 medication(s) that are the same as other medications prescribed for you. Read the directions carefully, and ask your doctor or other care provider to review them with you.         Where to get your medicines      Some of these will need a paper prescription and others can be bought over the counter.  Ask your nurse if you have questions.     Bring a paper prescription for each of these medications     amphetamine-dextroamphetamine 10 MG per tablet    amphetamine-dextroamphetamine 10 MG per tablet    amphetamine-dextroamphetamine 10 MG per tablet    amphetamine-dextroamphetamine 20 MG per 24 hr capsule    amphetamine-dextroamphetamine 20 MG per 24 hr capsule    amphetamine-dextroamphetamine 20 MG per 24 hr capsule    LORazepam 0.5 MG tablet                Primary Care Provider Office Phone # Fax #    Miriam Kimball [phone]       [address]  [facility] [address]        Equal Access to Services     MADISYN DE LA O AH: Hadii tal lassiter hadjenno Sochristiana, waaxda luqadaha, qaybta kaalmada adeangela, cayla haddad. So [facility] [phone].    ATENCIÓN: Si habla español, tiene a espitia disposición servicios gratuitos de asistencia lingüística. Deep al [phone].    We comply with applicable federal civil rights laws and [place] laws. We do not discriminate on the basis of race, color, national  origin, age, disability, sex, sexual orientation, or gender identity.            Thank you!     Thank you for choosing [facility]  for your care. Our goal is always to provide you with excellent care. Hearing back from our patients is one way we can continue to improve our services. Please take a few minutes to complete the written survey that you may receive in the mail after your visit with us. Thank you!             Your Updated Medication List - Protect others around you: Learn how to safely use, store and throw away your medicines at www.disposemymeds.org.          This list is accurate as of: 12/12/17 11:59 PM.  Always use your most recent med list.                   Brand Name Dispense Instructions for use Diagnosis    * amphetamine-dextroamphetamine 20 MG per tablet    ADDERALL    30 tablet    Take 1 tab (20 mg) every afternoon.    Attention-deficit hyperactivity disorder, predominantly inattentive type       * amphetamine-dextroamphetamine 20 MG per 24 hr capsule    ADDERALL XR    30 capsule    Take 1 capsule (20 mg) by mouth daily    Major depressive disorder, recurrent episode, moderate (H)       * amphetamine-dextroamphetamine 10 MG per tablet    ADDERALL    30 tablet    Take 1 tablet (10 mg) by mouth daily    Major depressive disorder, recurrent episode, moderate (H)       * amphetamine-dextroamphetamine 20 MG per 24 hr capsule    ADDERALL XR    30 capsule    Take 1 capsule (20 mg) by mouth daily    Major depressive disorder, recurrent episode, moderate (H)       * amphetamine-dextroamphetamine 10 MG per tablet    ADDERALL    30 tablet    Take 1 tablet (10 mg) by mouth daily    Major depressive disorder, recurrent episode, moderate (H)       * amphetamine-dextroamphetamine 20 MG per 24 hr capsule   Start taking on:  2/12/2018    ADDERALL XR    30 capsule    Take 1 capsule (20 mg) by mouth daily    Major depressive disorder, recurrent episode, moderate (H)       * amphetamine-dextroamphetamine 10 MG  per tablet   Start taking on:  2/12/2018    ADDERALL    30 tablet    Take 1 tablet (10 mg) by mouth daily    Major depressive disorder, recurrent episode, moderate (H)       DULoxetine 60 MG EC capsule    CYMBALTA    90 capsule    Take 1 capsule (60 mg) by mouth daily    Major depressive disorder, recurrent episode, moderate (H)       LORazepam 0.5 MG tablet    ATIVAN    30 tablet    Take 1 tablet by mouth daily as needed for anxiety.    Major depressive disorder, recurrent episode, moderate (H)       * Notice:  This list has 7 medication(s) that are the same as other medications prescribed for you. Read the directions carefully, and ask your doctor or other care provider to review them with you.

## 2017-12-13 RX ORDER — LORAZEPAM 0.5 MG/1
TABLET ORAL
Qty: 30 TABLET | Refills: 2 | Status: SHIPPED | OUTPATIENT
Start: 2017-12-13 | End: 2017-12-18

## 2017-12-15 ENCOUNTER — OFFICE VISIT (OUTPATIENT)
Dept: OBGYN | Facility: CLINIC | Age: 27
End: 2017-12-15
Payer: COMMERCIAL

## 2017-12-15 VITALS
BODY MASS INDEX: 23.95 KG/M2 | HEIGHT: 66 IN | DIASTOLIC BLOOD PRESSURE: 78 MMHG | WEIGHT: 149 LBS | SYSTOLIC BLOOD PRESSURE: 110 MMHG

## 2017-12-15 DIAGNOSIS — Z01.419 ENCOUNTER FOR GYNECOLOGICAL EXAMINATION WITHOUT ABNORMAL FINDING: Primary | ICD-10-CM

## 2017-12-15 DIAGNOSIS — Z11.3 SCREEN FOR STD (SEXUALLY TRANSMITTED DISEASE): ICD-10-CM

## 2017-12-15 DIAGNOSIS — Z97.5 NEXPLANON IN PLACE: ICD-10-CM

## 2017-12-15 DIAGNOSIS — Z12.4 CERVICAL CANCER SCREENING: ICD-10-CM

## 2017-12-15 DIAGNOSIS — Z11.8 SCREENING FOR CHLAMYDIAL DISEASE: ICD-10-CM

## 2017-12-15 PROCEDURE — 99385 PREV VISIT NEW AGE 18-39: CPT | Performed by: OBSTETRICS & GYNECOLOGY

## 2017-12-15 PROCEDURE — G0145 SCR C/V CYTO,THINLAYER,RESCR: HCPCS | Performed by: OBSTETRICS & GYNECOLOGY

## 2017-12-15 PROCEDURE — 87591 N.GONORRHOEAE DNA AMP PROB: CPT | Performed by: OBSTETRICS & GYNECOLOGY

## 2017-12-15 PROCEDURE — 87491 CHLMYD TRACH DNA AMP PROBE: CPT | Performed by: OBSTETRICS & GYNECOLOGY

## 2017-12-15 ASSESSMENT — ANXIETY QUESTIONNAIRES
1. FEELING NERVOUS, ANXIOUS, OR ON EDGE: SEVERAL DAYS
7. FEELING AFRAID AS IF SOMETHING AWFUL MIGHT HAPPEN: NOT AT ALL
IF YOU CHECKED OFF ANY PROBLEMS ON THIS QUESTIONNAIRE, HOW DIFFICULT HAVE THESE PROBLEMS MADE IT FOR YOU TO DO YOUR WORK, TAKE CARE OF THINGS AT HOME, OR GET ALONG WITH OTHER PEOPLE: SOMEWHAT DIFFICULT
5. BEING SO RESTLESS THAT IT IS HARD TO SIT STILL: NOT AT ALL
GAD7 TOTAL SCORE: 4
3. WORRYING TOO MUCH ABOUT DIFFERENT THINGS: SEVERAL DAYS
6. BECOMING EASILY ANNOYED OR IRRITABLE: SEVERAL DAYS
2. NOT BEING ABLE TO STOP OR CONTROL WORRYING: SEVERAL DAYS

## 2017-12-15 ASSESSMENT — PATIENT HEALTH QUESTIONNAIRE - PHQ9
SUM OF ALL RESPONSES TO PHQ QUESTIONS 1-9: 1
5. POOR APPETITE OR OVEREATING: NOT AT ALL

## 2017-12-15 NOTE — PROGRESS NOTES
Saskia is a 27 year old  female who presents for annual exam.     Besides routine health maintenance, she has no other health concerns today .    HPI:  The patient has no PCP on file.    NP    Had Nexplanon placed and  end of November. Has not been SA since .   Periods were pretty regular for Nexplanon, from her understanding.   Plans to do another.    Pap  neg at [facility]. Thinks had normal one in , neg.     Exercises regularly.   MTV for women. Drinks milk.   Vegetarian.        GYNECOLOGIC HISTORY:    Patient's last menstrual period was 2017 (exact date).  Her current contraception method is: Nexplanon.  She  reports that she has never smoked. She has never used smokeless tobacco.      Patient is not sexually active.  STD testing offered?  Declined  Last PHQ-9 score on record =   PHQ-9 SCORE 12/15/2017   Total Score 1   Some encounter information is confidential and restricted. Go to Review Flowsheets activity to see all data.     Last GAD7 score on record =   ZANE-7 SCORE 12/15/2017   Total Score 4     Alcohol Score = 3    HEALTH MAINTENANCE:  Cholesterol: Never  Last Mammo: N/A, Result: not applicable, Next Mammo: Age 40   Pap: Per patient 3 plus years ago  Lab Results   Component Value Date    PAP ASC-US 2012    Colonoscopy:  N/A, Result: not applicable, Next Colonoscopy: Age 50  Dexa:  Never    Health maintenance updated:  yes    HISTORY:  Obstetric History       T0      L0     SAB0   TAB0   Ectopic0   Multiple0   Live Births0       # Outcome Date GA Lbr Yoandy/2nd Weight Sex Delivery Anes PTL Lv   1 AB               Obstetric Comments   Meds used       Patient Active Problem List   Diagnosis     Attention deficit hyperactivity disorder (ADHD)     Nexplanon insertion     Past Surgical History:   Procedure Laterality Date     DENTAL SURGERY      wisdom teeth extraction      Social History   Substance Use Topics     Smoking status: Never Smoker      "Smokeless tobacco: Never Used     Alcohol use Yes      Comment: 2 glasses 2 times a week      Problem (# of Occurrences) Relation (Name,Age of Onset)    CEREBROVASCULAR DISEASE (1) Father:      Hypertension (1) Father            Current Outpatient Prescriptions   Medication Sig     LORazepam (ATIVAN) 0.5 MG tablet Take 1 tablet by mouth daily as needed for anxiety.     amphetamine-dextroamphetamine (ADDERALL XR) 20 MG per 24 hr capsule Take 1 capsule (20 mg) by mouth daily     DULoxetine (CYMBALTA) 60 MG EC capsule Take 1 capsule (60 mg) by mouth daily     amphetamine-dextroamphetamine (ADDERALL) 20 MG per tablet Take 1 tab (20 mg) every afternoon.     No current facility-administered medications for this visit.      No Known Allergies    Past medical, surgical, social and family histories were reviewed and updated in EPIC.    ROS:   12 point review of systems negative other than symptoms noted below.    EXAM:  /78  Ht 5' 6\" (1.676 m)  Wt 149 lb (67.6 kg)  LMP 2017 (Exact Date)  BMI 24.05 kg/m2   BMI: Body mass index is 24.05 kg/(m^2).    PHYSICAL EXAM:  Constitutional:  Appearance: Well nourished, well developed, alert, in no acute distress  Neck:  Lymph Nodes:  No lymphadenopathy present    Thyroid:  Gland size normal, nontender, no nodules or masses present  on palpation  Chest:  Respiratory Effort:  Breathing unlabored  Cardiovascular:    Heart: Auscultation:  Regular rate, normal rhythm, no murmurs present  Breasts: Inspection of Breasts:  No lymphadenopathy present., Palpation of Breasts and Axillae:  No masses present on palpation, no breast tenderness., Axillary Lymph Nodes:  No lymphadenopathy present. and No nodularity, asymmetry or nipple discharge bilaterally.  Gastrointestinal:   Abdominal Examination:  Abdomen nontender to palpation, tone normal without rigidity or guarding, no masses present, umbilicus without lesions   Liver and Spleen:  No hepatomegaly present, liver " nontender to palpation    Hernias:  No hernias present  Lymphatic: Lymph Nodes:  No other lymphadenopathy present  Skin:  General Inspection:  No rashes present, no lesions present, no areas of  discoloration    Genitalia and Groin:  No rashes present, no lesions present, no areas of  discoloration, no masses present  Neurologic/Psychiatric:    Mental Status:  Oriented X3     Pelvic Exam:  External Genitalia:     Normal appearance for age, no discharge present, no tenderness present, no inflammatory lesions present, color normal  Vagina:     Normal vaginal vault without central or paravaginal defects, no discharge present, no inflammatory lesions present, no masses present  Bladder:     Nontender to palpation  Urethra:   Urethral Body:  Urethra palpation normal, urethra structural support normal   Urethral Meatus:  No erythema or lesions present  Cervix:     Appearance healthy, no lesions present, nontender to palpation, no bleeding present  Uterus:     Uterus: firm, normal sized and nontender, midplane in position.   Adnexa:     No adnexal tenderness present, no adnexal masses present  Perineum:     Perineum within normal limits, no evidence of trauma, no rashes or skin lesions present  Anus:     Anus within normal limits, no hemorrhoids present  Inguinal Lymph Nodes:     No lymphadenopathy present  Pubic Hair:     Normal pubic hair distribution for age  Genitalia and Groin:     No rashes present, no lesions present, no areas of discoloration, no masses present      COUNSELING:   Reviewed preventive health counseling, as reflected in patient instructions       Contraception       Folic Acid CounselingOsteoporosis Prevention/Bone Health               BMI: Body mass index is 24.05 kg/(m^2).        ASSESSMENT:  27 year old female with satisfactory annual exam.    ICD-10-CM    1. Encounter for gynecological examination without abnormal finding Z01.419 Pap imaged thin layer screen reflex to HPV if ASCUS - recommended  age 25 - 29 years   2. Screen for STD (sexually transmitted disease) Z11.3 NEISSERIA GONORRHOEA PCR   3. Screening for chlamydial disease Z11.8 CHLAMYDIA TRACHOMATIS PCR   4. Cervical cancer screening Z12.4 Pap imaged thin layer screen reflex to HPV if ASCUS - recommended age 25 - 29 years   5. Nexplanon in place Z97.5        PLAN:  -Pap obtained for cervical cancer screening. Reviewed guidelines-pap q 3yrs until age 30 when co-testing q 5 years.  -Breast self awareness discussed.   -Osteoporosis prevention discussed.  -Desires GC/C labs  -Nexplanon in place, . Discussed need for contraception until can be exchanged. PT will schedule exchange.  Reviewed other options for contraception, desires nexplanon.   -Follows with psych for comorbidities and meds.  -Return one year for next annual exam      Zee Bautista Masters, DO

## 2017-12-15 NOTE — MR AVS SNAPSHOT
After Visit Summary   12/15/2017    Saskia Ortega    MRN: [id number]           Patient Information     Date Of Birth          1990        Visit Information        Provider Department      12/15/2017 2:50 PM Zee Brooks DO [facility]        Today's Diagnoses     Encounter for gynecological examination without abnormal finding    -  1    Screen for STD (sexually transmitted disease)        Screening for chlamydial disease        Cervical cancer screening        Nexplanon in place           Follow-ups after your visit        Follow-up notes from your care team     Return in about 1 week (around 12/22/2017).      Your next 10 appointments already scheduled     Dec 22, 2017  2:50 PM CST   Office Visit with Zee Brooks DO   [facility] ([facility])    [address]  [facility] [place] [facility] [address] [phone]           Bring a current list of meds and any records pertaining to this visit. For Physicals, please bring immunization records and any forms needing to be filled out. Please arrive 10 minutes early to complete paperwork.            Jan 16, 2018  4:30 PM CST   Adult Med Follow UP with Yessica Wilkins MD   [facility] ([facility])    [address] [place] [address] [phone]              Who to contact     If you have questions or need follow up information about today's clinic visit or your schedule please contact [facility] directly at [phone].  Normal or non-critical lab and imaging results will be communicated to you by MyChart, letter or phone within 4 business days after the clinic has received the results. If you do not hear from us within 7 days, please contact the clinic through MyChart or phone. If you have a critical or abnormal lab result, we will notify you by phone as soon as possible.  Submit refill requests  "through [employer] or call your pharmacy and they will forward the refill request to us. Please allow 3 business days for your refill to be completed.          Additional Information About Your Visit        [employer] Information     [employer] gives you secure access to your electronic health record. If you see a primary care provider, you can also send messages to your care team and make appointments. If you have questions, please call your primary care clinic.  If you do not have a primary care provider, please call [phone] and they will assist you.        Care EveryWhere ID     This is your Care EveryWhere ID. This could be used by other organizations to access your [place] medical records  [id number]        Your Vitals Were     Height Last Period BMI (Body Mass Index)             5' 6\" (1.676 m) 11/13/2017 (Exact Date) 24.05 kg/m2          Blood Pressure from Last 3 Encounters:   12/15/17 110/78   12/12/17 126/76   07/11/17 129/79    Weight from Last 3 Encounters:   12/15/17 149 lb (67.6 kg)   11/23/15 135 lb (61.2 kg)   05/20/14 120 lb (54.4 kg)              We Performed the Following     CHLAMYDIA TRACHOMATIS PCR     NEISSERIA GONORRHOEA PCR     Pap imaged thin layer screen reflex to HPV if ASCUS - recommended age 25 - 29 years        Primary Care Provider Office Phone # Fax #    Miriam Kimball [phone]       [address]  [facility] [address]        Equal Access to Services     [facility] : Hadii tal ku hadasho Soomaali, waaxda luqadaha, qaybta kaalmada adeegmiguel, waxay noe de anda adeada eng . So [facility] [phone].    ATENCIÓN: Si habla slyañol, tiene a espitia disposición servicios gratuitos de asistencia lingüística. Llame al [phone].    We comply with applicable federal civil rights laws and [place] laws. We do not discriminate on the basis of race, color, national origin, age, disability, sex, sexual orientation, or gender identity.            Thank " you!     Thank you for choosing [facility]  for your care. Our goal is always to provide you with excellent care. Hearing back from our patients is one way we can continue to improve our services. Please take a few minutes to complete the written survey that you may receive in the mail after your visit with us. Thank you!             Your Updated Medication List - Protect others around you: Learn how to safely use, store and throw away your medicines at www.disposemymeds.org.          This list is accurate as of: 12/15/17  9:33 PM.  Always use your most recent med list.                   Brand Name Dispense Instructions for use Diagnosis    * amphetamine-dextroamphetamine 20 MG per tablet    ADDERALL    30 tablet    Take 1 tab (20 mg) every afternoon.    Attention-deficit hyperactivity disorder, predominantly inattentive type       * amphetamine-dextroamphetamine 20 MG per 24 hr capsule    ADDERALL XR    30 capsule    Take 1 capsule (20 mg) by mouth daily    Major depressive disorder, recurrent episode, moderate (H)       DULoxetine 60 MG EC capsule    CYMBALTA    90 capsule    Take 1 capsule (60 mg) by mouth daily    Major depressive disorder, recurrent episode, moderate (H)       LORazepam 0.5 MG tablet    ATIVAN    30 tablet    Take 1 tablet by mouth daily as needed for anxiety.    Major depressive disorder, recurrent episode, moderate (H)       * Notice:  This list has 2 medication(s) that are the same as other medications prescribed for you. Read the directions carefully, and ask your doctor or other care provider to review them with you.

## 2017-12-16 ASSESSMENT — ANXIETY QUESTIONNAIRES: GAD7 TOTAL SCORE: 4

## 2017-12-18 RX ORDER — LORAZEPAM 0.5 MG/1
TABLET ORAL
Qty: 30 TABLET | Refills: 2 | Status: SHIPPED | OUTPATIENT
Start: 2017-12-18 | End: 2017-12-18

## 2017-12-18 RX ORDER — LORAZEPAM 0.5 MG/1
TABLET ORAL
Qty: 30 TABLET | Refills: 2 | Status: SHIPPED | OUTPATIENT
Start: 2017-12-18 | End: 2018-03-27

## 2017-12-18 NOTE — TELEPHONE ENCOUNTER
-received paper script from Dr. Wilkins, asking writer to fax to pharmacy as it would not print at appointment last week.  -Writer faxed refill to [facility] [phone]  -Received fax confirmation

## 2017-12-19 LAB
C TRACH DNA SPEC QL NAA+PROBE: NEGATIVE
N GONORRHOEA DNA SPEC QL NAA+PROBE: NEGATIVE
SPECIMEN SOURCE: NORMAL
SPECIMEN SOURCE: NORMAL

## 2017-12-20 LAB
COPATH REPORT: NORMAL
PAP: NORMAL

## 2017-12-22 ENCOUNTER — OFFICE VISIT (OUTPATIENT)
Dept: OBGYN | Facility: CLINIC | Age: 27
End: 2017-12-22
Payer: COMMERCIAL

## 2017-12-22 VITALS
WEIGHT: 147 LBS | DIASTOLIC BLOOD PRESSURE: 62 MMHG | HEIGHT: 66 IN | SYSTOLIC BLOOD PRESSURE: 110 MMHG | BODY MASS INDEX: 23.63 KG/M2

## 2017-12-22 DIAGNOSIS — Z30.017 NEXPLANON INSERTION: Primary | ICD-10-CM

## 2017-12-22 DIAGNOSIS — Z01.812 PRE-PROCEDURE LAB EXAM: ICD-10-CM

## 2017-12-22 DIAGNOSIS — Z30.46 NEXPLANON REMOVAL: ICD-10-CM

## 2017-12-22 LAB — BETA HCG QUAL IFA URINE: NEGATIVE

## 2017-12-22 PROCEDURE — 11983 REMOVE/INSERT DRUG IMPLANT: CPT | Performed by: OBSTETRICS & GYNECOLOGY

## 2017-12-22 PROCEDURE — 84703 CHORIONIC GONADOTROPIN ASSAY: CPT | Performed by: OBSTETRICS & GYNECOLOGY

## 2017-12-22 NOTE — MR AVS SNAPSHOT
After Visit Summary   12/22/2017    Saskia Ortega    MRN: [id number]           Patient Information     Date Of Birth          1990        Visit Information        Provider Department      12/22/2017 2:50 PM Zee Brooks DO [facility]        Today's Diagnoses     Nexplanon insertion    -  1    Pre-procedure lab exam        Nexplanon removal           Follow-ups after your visit        Your next 10 appointments already scheduled     Jan 16, 2018  4:30 PM CST   Adult Med Follow UP with Yessica Wilkins MD   [facility] ([facility])    [address]  [place] [address] [phone]              Who to contact     If you have questions or need follow up information about today's clinic visit or your schedule please contact [facility] directly at [phone].  Normal or non-critical lab and imaging results will be communicated to you by MyChart, letter or phone within 4 business days after the clinic has received the results. If you do not hear from us within 7 days, please contact the clinic through [employer] or phone. If you have a critical or abnormal lab result, we will notify you by phone as soon as possible.  Submit refill requests through [employer] or call your pharmacy and they will forward the refill request to us. Please allow 3 business days for your refill to be completed.          Additional Information About Your Visit        MyChart Information     [employer] gives you secure access to your electronic health record. If you see a primary care provider, you can also send messages to your care team and make appointments. If you have questions, please call your primary care clinic.  If you do not have a primary care provider, please call [phone] and they will assist you.        Care EveryWhere ID     This is your Care EveryWhere ID. This could be used by other organizations to access your  "[facility] medical records  [id number]        Your Vitals Were     Height Last Period BMI (Body Mass Index)             5' 6\" (1.676 m) 11/13/2017 (Exact Date) 23.73 kg/m2          Blood Pressure from Last 3 Encounters:   12/22/17 110/62   12/15/17 110/78   12/12/17 126/76    Weight from Last 3 Encounters:   12/22/17 147 lb (66.7 kg)   12/15/17 149 lb (67.6 kg)   11/23/15 135 lb (61.2 kg)              We Performed the Following     Beta HCG qual IFA urine - [facility] and [place]     ETONOGESTREL IMPLANT SYSTEM     INSERTION NON-BIODEGRADABLE DRUG DELIVERY IMPLANT     REMOVAL NON-BIODEGRADABLE DRUG DELIVERY IMPLANT          Today's Medication Changes          These changes are accurate as of: 12/22/17  3:53 PM.  If you have any questions, ask your nurse or doctor.               Start taking these medicines.        Dose/Directions    etonogestrel 68 MG Impl   Commonly known as:  IMPLANON/NEXPLANON   Used for:  Nexplanon insertion   Started by:  Zee Brooks,         Dose:  1 each   1 each (68 mg) by Subdermal route once for 1 dose Lot [id number], Exp 05/2020   Quantity:  1 each   Refills:  0            Where to get your medicines      Some of these will need a paper prescription and others can be bought over the counter.  Ask your nurse if you have questions.     You don't need a prescription for these medications     etonogestrel 68 MG Impl                Primary Care Provider Office Phone # Fax #    Miriam Kimball [phone]       [address] [facility] [address]  [facility] [address]        Equal Access to Services     [facility]: Hadii tal ku hadasho Soomaali, waaxda luqadaha, qaybta kaalmada adeegyatrenton, cayla haddad. So [facility] [phone].    ATENCIÓN: Si habla español, tiene a espitia disposición servicios gratuitos de asistencia lingüística. Llame al [phone].    We comply with applicable federal civil rights laws and [place] laws. We do not " discriminate on the basis of race, color, national origin, age, disability, sex, sexual orientation, or gender identity.            Thank you!     Thank you for choosing [facility]  for your care. Our goal is always to provide you with excellent care. Hearing back from our patients is one way we can continue to improve our services. Please take a few minutes to complete the written survey that you may receive in the mail after your visit with us. Thank you!             Your Updated Medication List - Protect others around you: Learn how to safely use, store and throw away your medicines at www.disposemymeds.org.          This list is accurate as of: 12/22/17  3:53 PM.  Always use your most recent med list.                   Brand Name Dispense Instructions for use Diagnosis    * amphetamine-dextroamphetamine 20 MG per tablet    ADDERALL    30 tablet    Take 1 tab (20 mg) every afternoon.    Attention-deficit hyperactivity disorder, predominantly inattentive type       * amphetamine-dextroamphetamine 20 MG per 24 hr capsule    ADDERALL XR    30 capsule    Take 1 capsule (20 mg) by mouth daily    Major depressive disorder, recurrent episode, moderate (H)       DULoxetine 60 MG EC capsule    CYMBALTA    90 capsule    Take 1 capsule (60 mg) by mouth daily    Major depressive disorder, recurrent episode, moderate (H)       etonogestrel 68 MG Impl    IMPLANON/NEXPLANON    1 each    1 each (68 mg) by Subdermal route once for 1 dose Lot [id number], Exp 05/2020    Nexplanon insertion       LORazepam 0.5 MG tablet    ATIVAN    30 tablet    Take 1 tablet by mouth daily as needed for anxiety.    Major depressive disorder, recurrent episode, moderate (H)       * Notice:  This list has 2 medication(s) that are the same as other medications prescribed for you. Read the directions carefully, and ask your doctor or other care provider to review them with you.

## 2017-12-22 NOTE — PROGRESS NOTES
INDICATIONS:                                                      Saskia is here today for removal and reinsertion of Nexplanon contraceptive implant.     Is a pregnancy test required: Yes.  Was it positive or negative?  Negative  Was a consent obtained?  Yes      PROCEDURE:                                                      Patient was placed in dorsal supine position with left arm abducted and externally rotated. Nexplanon was palpated under skin. The area was cleansed with betadine. The distal site was injected with 2 ml of 1% plain lidocaine.  While pushing down on the proximal end, 2 mm incision was made over the distal implant with a scalpel. The implant was grasped with a mosquito forceps and removed intact. The skin was closed with Steristrip. A pressure bandage was placed for the next 12-24 hours.  She tolerated the procedure well. There were no complications. Patient was discharged in stable condition.      INDICATIONS:                                                      Patient presents for placement of Nexplanon for contraception.  She has had been counseled on side effects, risks, benefits and alternatives.  Patient desires to proceed.    Verification of Procedure:  Just before the procedure begans through verbal and active participation of team members, I verified:     Initials   Patient Name Saskia Ortega   Patient  1990   Procedure to be performed Nexplanon removal and reinsertion     Consent:  Risks, benefits of treatment, and alternative options for contraception were discussed.  Patient's questions were elicited and answered.  Written consent was obtained and scanned into medical record.       PROCEDURE:                                                      Patient was resting comfortably on exam table, left arm placed at shoulder level in a 90 degree angle.  Skin was marked 8cm from epicondyl and cleansed with betadine solution.  Insertion site and track infiltrated with 2 cc 1%  Lidocaine.      Nexplanon device visualized in applicator by patient and provider.  Skin punctured with applicator at insertion site and advanced with ease in the subdermal space.  Applicator was removed.  Nexplanon was palpated by provider and patient.    Small amount of bleeding noted at insertion site and slight bruising noted along track of Nexplanon.  Bandage and pressure dressing applied to insertion site.       Lot [id number], Exp 05/2020  NDC 1244-9668-20    POST PROCEDURE:                                                      To be removed/replaced within three years. Written and verbal instructions provided to patient.  She tolerated the procedure well. There were no complications. Patient was discharged in stable condition.    Keep dressing on and dry for 24 hours, then remove wrap.  Replace bandaid daily for 5 days. Keep your user card in a safe place where you'll remember it.  Make note of today's date for removal/replacement of your Nexplanon in 3 years. Call us if there is any redness, tenderness, warmth or drainage from the area of your Nexplanon insertion. Use a backup method of birth control for 7 days.      Zee Bautista Masters, DO

## 2018-03-27 ENCOUNTER — OFFICE VISIT (OUTPATIENT)
Dept: PSYCHIATRY | Facility: CLINIC | Age: 28
End: 2018-03-27
Payer: COMMERCIAL

## 2018-03-27 VITALS — HEART RATE: 74 BPM | SYSTOLIC BLOOD PRESSURE: 119 MMHG | DIASTOLIC BLOOD PRESSURE: 72 MMHG

## 2018-03-27 DIAGNOSIS — F33.1 MAJOR DEPRESSIVE DISORDER, RECURRENT EPISODE, MODERATE (H): ICD-10-CM

## 2018-03-27 DIAGNOSIS — F90.0 ATTENTION-DEFICIT HYPERACTIVITY DISORDER, PREDOMINANTLY INATTENTIVE TYPE: Primary | ICD-10-CM

## 2018-03-27 RX ORDER — DEXTROAMPHETAMINE SACCHARATE, AMPHETAMINE ASPARTATE MONOHYDRATE, DEXTROAMPHETAMINE SULFATE AND AMPHETAMINE SULFATE 5; 5; 5; 5 MG/1; MG/1; MG/1; MG/1
20 CAPSULE, EXTENDED RELEASE ORAL DAILY
Qty: 30 CAPSULE | Refills: 0 | Status: SHIPPED | OUTPATIENT
Start: 2018-04-27 | End: 2018-05-27

## 2018-03-27 RX ORDER — DEXTROAMPHETAMINE SACCHARATE, AMPHETAMINE ASPARTATE MONOHYDRATE, DEXTROAMPHETAMINE SULFATE AND AMPHETAMINE SULFATE 5; 5; 5; 5 MG/1; MG/1; MG/1; MG/1
20 CAPSULE, EXTENDED RELEASE ORAL DAILY
Qty: 30 CAPSULE | Refills: 0 | Status: SHIPPED | OUTPATIENT
Start: 2018-05-28 | End: 2018-06-27

## 2018-03-27 RX ORDER — DEXTROAMPHETAMINE SACCHARATE, AMPHETAMINE ASPARTATE, DEXTROAMPHETAMINE SULFATE AND AMPHETAMINE SULFATE 5; 5; 5; 5 MG/1; MG/1; MG/1; MG/1
20 TABLET ORAL DAILY
Qty: 30 TABLET | Refills: 0 | Status: SHIPPED | OUTPATIENT
Start: 2018-05-28 | End: 2018-06-27

## 2018-03-27 RX ORDER — LORAZEPAM 0.5 MG/1
TABLET ORAL
Qty: 30 TABLET | Refills: 2 | Status: SHIPPED | OUTPATIENT
Start: 2018-03-27 | End: 2018-08-07

## 2018-03-27 RX ORDER — DEXTROAMPHETAMINE SACCHARATE, AMPHETAMINE ASPARTATE MONOHYDRATE, DEXTROAMPHETAMINE SULFATE AND AMPHETAMINE SULFATE 5; 5; 5; 5 MG/1; MG/1; MG/1; MG/1
20 CAPSULE, EXTENDED RELEASE ORAL DAILY
Qty: 30 CAPSULE | Refills: 0 | Status: SHIPPED | OUTPATIENT
Start: 2018-03-27 | End: 2019-01-31

## 2018-03-27 RX ORDER — DEXTROAMPHETAMINE SACCHARATE, AMPHETAMINE ASPARTATE, DEXTROAMPHETAMINE SULFATE AND AMPHETAMINE SULFATE 5; 5; 5; 5 MG/1; MG/1; MG/1; MG/1
20 TABLET ORAL DAILY
Qty: 30 TABLET | Refills: 0 | Status: SHIPPED | OUTPATIENT
Start: 2018-04-27 | End: 2018-05-27

## 2018-03-27 RX ORDER — DULOXETIN HYDROCHLORIDE 60 MG/1
60 CAPSULE, DELAYED RELEASE ORAL DAILY
Qty: 90 CAPSULE | Refills: 1 | Status: SHIPPED | OUTPATIENT
Start: 2018-03-27 | End: 2018-08-07

## 2018-03-27 RX ORDER — DEXTROAMPHETAMINE SACCHARATE, AMPHETAMINE ASPARTATE, DEXTROAMPHETAMINE SULFATE AND AMPHETAMINE SULFATE 5; 5; 5; 5 MG/1; MG/1; MG/1; MG/1
20 TABLET ORAL DAILY
Qty: 30 TABLET | Refills: 0 | Status: SHIPPED | OUTPATIENT
Start: 2018-03-27 | End: 2019-01-31

## 2018-03-27 NOTE — MR AVS SNAPSHOT
After Visit Summary   3/27/2018    Saskia Ortega    MRN: [id number]           Patient Information     Date Of Birth          1990        Visit Information        Provider Department      3/27/2018 4:30 PM Yessica Wilkins MD [facility]        Today's Diagnoses     Attention-deficit hyperactivity disorder, predominantly inattentive type    -  1    Major depressive disorder, recurrent episode, moderate (H)           Follow-ups after your visit        Who to contact     Please call your clinic at [phone] to:    Ask questions about your health    Make or cancel appointments    Discuss your medicines    Learn about your test results    Speak to your doctor            Additional Information About Your Visit        [employer] Information     [employer] gives you secure access to your electronic health record. If you see a primary care provider, you can also send messages to your care team and make appointments. If you have questions, please call your primary care clinic.  If you do not have a primary care provider, please call [phone] and they will assist you.      [employer] is an electronic gateway that provides easy, online access to your medical records. With [employer], you can request a clinic appointment, read your test results, renew a prescription or communicate with your care team.     To access your existing account, please contact your [facility] or call [phone] for assistance.        Care EveryWhere ID     This is your Care EveryWhere ID. This could be used by other organizations to access your [facility] medical records  [id number]        Your Vitals Were     Pulse                   74            Blood Pressure from Last 3 Encounters:   03/27/18 119/72   12/22/17 110/62   12/15/17 110/78    Weight from Last 3 Encounters:   12/22/17 66.7 kg (147 lb)   12/15/17 67.6 kg (149 lb)   11/23/15 61.2 kg (135 lb)              Today, you had the following      No orders found for display         Today's Medication Changes          These changes are accurate as of 3/27/18 11:59 PM.  If you have any questions, ask your nurse or doctor.               These medicines have changed or have updated prescriptions.        Dose/Directions    * amphetamine-dextroamphetamine 20 MG per tablet   Commonly known as:  ADDERALL   This may have changed:  Another medication with the same name was added. Make sure you understand how and when to take each.   Used for:  Attention-deficit hyperactivity disorder, predominantly inattentive type        Take 1 tab (20 mg) every afternoon.   Quantity:  30 tablet   Refills:  0       * amphetamine-dextroamphetamine 20 MG per 24 hr capsule   Commonly known as:  ADDERALL XR   This may have changed:  You were already taking a medication with the same name, and this prescription was added. Make sure you understand how and when to take each.   Used for:  Attention-deficit hyperactivity disorder, predominantly inattentive type        Dose:  20 mg   Take 1 capsule (20 mg) by mouth daily   Quantity:  30 capsule   Refills:  0       * amphetamine-dextroamphetamine 20 MG per tablet   Commonly known as:  ADDERALL   This may have changed:  You were already taking a medication with the same name, and this prescription was added. Make sure you understand how and when to take each.   Used for:  Attention-deficit hyperactivity disorder, predominantly inattentive type        Dose:  20 mg   Take 1 tablet (20 mg) by mouth daily   Quantity:  30 tablet   Refills:  0       * amphetamine-dextroamphetamine 20 MG per 24 hr capsule   Commonly known as:  ADDERALL XR   This may have changed:  You were already taking a medication with the same name, and this prescription was added. Make sure you understand how and when to take each.   Used for:  Attention-deficit hyperactivity disorder, predominantly inattentive type        Dose:  20 mg   Start taking on:  4/27/2018   Take 1  capsule (20 mg) by mouth daily   Quantity:  30 capsule   Refills:  0       * amphetamine-dextroamphetamine 20 MG per tablet   Commonly known as:  ADDERALL   This may have changed:  You were already taking a medication with the same name, and this prescription was added. Make sure you understand how and when to take each.   Used for:  Attention-deficit hyperactivity disorder, predominantly inattentive type        Dose:  20 mg   Start taking on:  4/27/2018   Take 1 tablet (20 mg) by mouth daily   Quantity:  30 tablet   Refills:  0       * amphetamine-dextroamphetamine 20 MG per 24 hr capsule   Commonly known as:  ADDERALL XR   This may have changed:  You were already taking a medication with the same name, and this prescription was added. Make sure you understand how and when to take each.   Used for:  Attention-deficit hyperactivity disorder, predominantly inattentive type        Dose:  20 mg   Start taking on:  5/28/2018   Take 1 capsule (20 mg) by mouth daily   Quantity:  30 capsule   Refills:  0       * amphetamine-dextroamphetamine 20 MG per tablet   Commonly known as:  ADDERALL   This may have changed:  You were already taking a medication with the same name, and this prescription was added. Make sure you understand how and when to take each.   Used for:  Attention-deficit hyperactivity disorder, predominantly inattentive type        Dose:  20 mg   Start taking on:  5/28/2018   Take 1 tablet (20 mg) by mouth daily   Quantity:  30 tablet   Refills:  0       * Notice:  This list has 7 medication(s) that are the same as other medications prescribed for you. Read the directions carefully, and ask your doctor or other care provider to review them with you.         Where to get your medicines      These medications were sent to [place] [id number] [place] [facility] [place], [place] - [address]  [address] [address]     Phone:  [phone]     DULoxetine 60 MG EC capsule         Some of these will  need a paper prescription and others can be bought over the counter.  Ask your nurse if you have questions.     Bring a paper prescription for each of these medications     amphetamine-dextroamphetamine 20 MG per 24 hr capsule    amphetamine-dextroamphetamine 20 MG per 24 hr capsule    amphetamine-dextroamphetamine 20 MG per 24 hr capsule    amphetamine-dextroamphetamine 20 MG per tablet    amphetamine-dextroamphetamine 20 MG per tablet    amphetamine-dextroamphetamine 20 MG per tablet    LORazepam 0.5 MG tablet                Primary Care Provider    None Specified       No primary provider on file.        Equal Access to Services     MADISYN DE LA O : Hadii aad ku hadasho Soomaali, waaxda luqadaha, qaybta kaalmada adeegyada, waxmiki haddad. So [facility] [phone].    ATENCIÓN: Si habla español, tiene a espitia disposición servicios gratuitos de asistencia lingüística. LlPremier Health [facility] [phone].    We comply with applicable federal civil rights laws and [place] laws. We do not discriminate on the basis of race, color, national origin, age, disability, sex, sexual orientation, or gender identity.            Thank you!     Thank you for choosing [facility]  for your care. Our goal is always to provide you with excellent care. Hearing back from our patients is one way we can continue to improve our services. Please take a few minutes to complete the written survey that you may receive in the mail after your visit with us. Thank you!             Your Updated Medication List - Protect others around you: Learn how to safely use, store and throw away your medicines at www.disposemymeds.org.          This list is accurate as of 3/27/18 11:59 PM.  Always use your most recent med list.                   Brand Name Dispense Instructions for use Diagnosis    * amphetamine-dextroamphetamine 20 MG per tablet    ADDERALL    30 tablet    Take 1 tab (20 mg) every afternoon.    Attention-deficit hyperactivity  disorder, predominantly inattentive type       * amphetamine-dextroamphetamine 20 MG per 24 hr capsule    ADDERALL XR    30 capsule    Take 1 capsule (20 mg) by mouth daily    Attention-deficit hyperactivity disorder, predominantly inattentive type       * amphetamine-dextroamphetamine 20 MG per tablet    ADDERALL    30 tablet    Take 1 tablet (20 mg) by mouth daily    Attention-deficit hyperactivity disorder, predominantly inattentive type       * amphetamine-dextroamphetamine 20 MG per 24 hr capsule   Start taking on:  4/27/2018    ADDERALL XR    30 capsule    Take 1 capsule (20 mg) by mouth daily    Attention-deficit hyperactivity disorder, predominantly inattentive type       * amphetamine-dextroamphetamine 20 MG per tablet   Start taking on:  4/27/2018    ADDERALL    30 tablet    Take 1 tablet (20 mg) by mouth daily    Attention-deficit hyperactivity disorder, predominantly inattentive type       * amphetamine-dextroamphetamine 20 MG per 24 hr capsule   Start taking on:  5/28/2018    ADDERALL XR    30 capsule    Take 1 capsule (20 mg) by mouth daily    Attention-deficit hyperactivity disorder, predominantly inattentive type       * amphetamine-dextroamphetamine 20 MG per tablet   Start taking on:  5/28/2018    ADDERALL    30 tablet    Take 1 tablet (20 mg) by mouth daily    Attention-deficit hyperactivity disorder, predominantly inattentive type       DULoxetine 60 MG EC capsule    CYMBALTA    90 capsule    Take 1 capsule (60 mg) by mouth daily    Major depressive disorder, recurrent episode, moderate (H)       etonogestrel 68 MG Impl    IMPLANON/NEXPLANON    1 each    1 each (68 mg) by Subdermal route once for 1 dose Lot [id number], Exp 05/2020    Nexplanon insertion       LORazepam 0.5 MG tablet    ATIVAN    30 tablet    Take 1 tablet by mouth daily as needed for anxiety.    Major depressive disorder, recurrent episode, moderate (H)       * Notice:  This list has 7 medication(s) that are the same as  other medications prescribed for you. Read the directions carefully, and ask your doctor or other care provider to review them with you.

## 2018-03-27 NOTE — PROGRESS NOTES
"PSYCHIATRY CLINIC PROGRESS NOTE  Medication management & Psychotherapy    IDENTIFICATION:  Saskia Ortega is a 26 year old female with previous psychiatric diagnoses of major depressive disorder, recurrent, generalized anxiety disorder, and ADHD.  Patient presents for ongoing psychiatric follow-up and was seen for initial evaluation on 5/04/2012.    SUBJECTIVE:  The patient was last seen in clinic on 12/12/2017 at which time no medication changes were made.  Since the time of the last visit:     Pt reports that she has been doing \"good\" since she was last seen.     Reports that she has had a sinus infection for the past several weeks. Was seen and placed on an antibiotic recently.    She states that work continues to go well. She continues to feel fulfilled in her position and positive about the work that she is accomplishing.    She and her boyfriend continue to \"figure things out.\" She recently learned that he was sexually active with another person after they broke up multiple months ago. She describes realizing in January that she just didn't want to fight with him any longer. She describes recognizing that the best thing for their relationship is that they behave respectfully towards each other. For this reason, she did not get angry at him when she learned about his infidelity. Went on to state that she was not surprised to learn of this as he informed her that he would \"need to sleep with someone else\" before he could be with her again. Spent a significant portion of session processing this aggressivity in this statement and her acceptance of it. Expressed concern about his need to \"punish\" her in retaliation for her infidelity in September. She acknowledged this but also stated that she did not \"want to lose him.\" States that she continues to believe that this relationship has the potential to be healthy again but recent historical acts of infidelity have diminished both of their abilities to trust the other " "person. States that they are \"taking things slowly\" and that she continues to prioritize her mental health.    States that her mood has been stable, despite ongoing friction with boyfriend.     Feels that current medication regimen is well managing mood and anxiety. Although she had expressed some concern for increased depressive symptoms when last seen, she states today that this has since resolved and mood continues to be euthymic.    Feels ADHD symptoms are well-managed with current medication regimen. Is also sleeping well with re-addition of lorazepam to medication list.    Denies other side effects to medication.    Symptoms:  Denies inattention in the afternoon. Denies sleep disruption. Denies infrequent low mood, anhedonia, fatigue, self-derogatory thoughts, appetite disturbance, psychomotor slowing or concentration problems.  Denies suicidal ideation.  Ongoing periodic anxiety.   Medication side-effects:  Previously experienced fatigue, weight gain, and loss of libido associated with sertraline as well as fatigue and amotivation with higher dose citalopram.  Medical ROS:     Cardiovascular: negative for palpitations, tachycardia  Gastrointestinal: negative for increased appetite, nausea, vomiting, constipation and diarrhea  Neurologic: negative for headaches and cognitive problems  Psychiatric: negative for sleep disturbance, increased stress, feeling anxious, thoughts of self-harm, agitation and sexual difficulties.    MEDICAL TEAM:         - Primary Medical Provider:  unknown  - Therapist: none currently (previously followed by this provider for supportive psychotherapy)    ALLERGIES: NKDA    MEDICATIONS:  Adderall XR 20 mg daily  Adderall IR 10 mg qAfternoon  Duloxetine 60 mg daily   Lorazepam 0.5 mg daily PRN anxiety/sleep    LABS: no new results  VITALS: /72  Pulse 74    OBJECTIVE:   Alert and oriented.  Well groomed, calm, cooperative with good eye contact.  No problems with speech or " "psychomotor behavior.  Mood was described as \"good.\" Affect was euthymic with improved brightness, congruent to speech content. Thought process was unremarkable and thought content was congruent to speech content, and devoid of suicidal and homicidal ideation and psychotic thought.  No hallucinations.  Insight was good.  Judgment was intact and adequate for safety.  Patient demonstrates no obvious problems with attention, concentration, language, short or long term memory by observation of conversation.  These were not formally tested.  Fund of knowledge was intact.    ASSESSMENT:    Historical:  Saskia Ortega is a 25 year old  female with history of depression and anxiety who presents for follow-up medication management. She was transitioned from Effexor to Celexa in spring/summer 2013 with good results.  She was previously seen for monthly combination medication management and psychotherapy by her last provider.  She describes obtaining benefit from this and requested to increase frequency of sessions to every other week to work on processing family issues which have become more apparent after beginning a new relationship.  She continued to find benefit from Adderall for ADD and lorazepam as PRN for anxiety. She took them as prescribed and had no issues with substance abuse.  Pt has a history trials of Effexor and citalopram which were both effective for managing depression but discontinued secondary to side effects.  In addition, trial of Vyvanse was attempted during fall 2014, however, pt did not find it as effective as Adderall for management of ADD sx and so was transitioned back to Adderall.  At January 2015 visit, pt described significant worsening of sx of depression and cross-titration from citalopram to sertraline was initiated.  She tolerated transition well and initially felt mood was well managed at 150 mg daily.    Current:  Today, pt reports mood, anxiety, attention, and sleep are well managed " with current medication regimen. Denies ongoing difficulties in relationship with boyfriend, however, appears to be navigating this well with appropriate emphasis on maintaining her mental health.    For the future, may consider increasing dose of duloxetine to 90 mg vs trial of escitalopram and/or bupropion should depression symptoms or anxiety worsen.    The risks, benefits, alternatives and potential adverse effects have been explained and are understood by the patient.  The patient agrees to the plan with the capacity to do so.  The patient knows to call the clinic for any problems or access emergency care if needed. The patient is not pregnant.  She is not abusing substances and shows no evidence for abuse of medication.  Controlled substances are still appropriate and required.  No medical contraindications to treatment.    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 (Refills x 3 months provided today, 3/27/2017)  -- Continue duloxetine 60 mg daily.  (Refills x 3 months provided today, 12/12/2017).  -- Continue lorazepam 0.5 mg daily PRN anxiety/insomnia.  (Refills x 3 months provided today, 12/12/2017).  -- Continue Adderall XR 20 mg daily. ( Refills x 3 months provided today, 12/12/2017).  Psychotherapy:   Will continue with monthly combined psychotherapy and medication management.   RTC:  1 months for MFU/  Labs/Monitoring:    -- Recommend pt's weight NOT be checked prior to appointments.  -- Continue to monitor BP while on [facility]        PSYCHIATRY CLINIC INDIVIDUAL PSYCHOTHERAPY NOTE                                                   [16]   Start time: 4:40pm  End time: 5:25pm    Date reviewed: 12/12/2017       Date  next due: 12/11/2018  Subjective: This supportive psychotherapy session addressed issues related to patient's history, current stressors, life stressors, relationships and work .  Patient's reaction: Preparatory in the context of mental status appropriate for ambulatory setting.  Progress: good  Plan: RTC 1 month  Psychotherapy services during this visit included  myself and Saskia Ortega.   TREATMENT  PLAN          SYMPTOMS; PROBLEMS   MEASURABLE GOALS;    FUNCTIONAL IMPROVEMENT INTERVENTIONS;   GAINS MADE DISCHARGE CRITERIA   Depression: depressed mood   report feeling more positive about self  strength focus marked symptom improvement   ADHD: inattention; Paying attention to details and Listening    develop strategies for thought distraction when ruminating strength focus marked symptom improvement         PROVIDER:  MD MITCHEL Mcmanus MD  [facility]  Department of Psychiatry

## 2018-03-30 ASSESSMENT — PATIENT HEALTH QUESTIONNAIRE - PHQ9: SUM OF ALL RESPONSES TO PHQ QUESTIONS 1-9: 3

## 2018-07-10 ENCOUNTER — MYC REFILL (OUTPATIENT)
Dept: PSYCHIATRY | Facility: CLINIC | Age: 28
End: 2018-07-10

## 2018-07-10 DIAGNOSIS — F90.0 ATTENTION-DEFICIT HYPERACTIVITY DISORDER, PREDOMINANTLY INATTENTIVE TYPE: ICD-10-CM

## 2018-07-10 RX ORDER — DEXTROAMPHETAMINE SACCHARATE, AMPHETAMINE ASPARTATE, DEXTROAMPHETAMINE SULFATE AND AMPHETAMINE SULFATE 5; 5; 5; 5 MG/1; MG/1; MG/1; MG/1
TABLET ORAL
Qty: 30 TABLET | Refills: 0 | Status: SHIPPED | OUTPATIENT
Start: 2018-07-10 | End: 2019-01-01

## 2018-07-10 NOTE — TELEPHONE ENCOUNTER
Last seen: 3/27/18  RTC:   Cancel: none  No-show: none  Next appt: none    Incoming refill from Patient via [employer]    Medication requested: Adderall 20 mg   Directions: Take 1 tablet by mouth every afternoon   Qty: 30  Last refilled: Last filled 5/28/18 per     Medication refill approved per refill protocol-Will ask patient to make an appointment.

## 2018-08-07 ENCOUNTER — OFFICE VISIT (OUTPATIENT)
Dept: PSYCHIATRY | Facility: CLINIC | Age: 28
End: 2018-08-07
Payer: COMMERCIAL

## 2018-08-07 VITALS — TEMPERATURE: 98.4 F | HEART RATE: 88 BPM | DIASTOLIC BLOOD PRESSURE: 78 MMHG | SYSTOLIC BLOOD PRESSURE: 113 MMHG

## 2018-08-07 DIAGNOSIS — F90.0 ATTENTION-DEFICIT HYPERACTIVITY DISORDER, PREDOMINANTLY INATTENTIVE TYPE: Primary | ICD-10-CM

## 2018-08-07 DIAGNOSIS — F33.1 MAJOR DEPRESSIVE DISORDER, RECURRENT EPISODE, MODERATE (H): ICD-10-CM

## 2018-08-07 RX ORDER — DEXTROAMPHETAMINE SACCHARATE, AMPHETAMINE ASPARTATE, DEXTROAMPHETAMINE SULFATE AND AMPHETAMINE SULFATE 5; 5; 5; 5 MG/1; MG/1; MG/1; MG/1
20 TABLET ORAL DAILY
Qty: 30 TABLET | Refills: 0 | Status: SHIPPED | OUTPATIENT
Start: 2018-09-07 | End: 2018-10-07

## 2018-08-07 RX ORDER — DEXTROAMPHETAMINE SACCHARATE, AMPHETAMINE ASPARTATE, DEXTROAMPHETAMINE SULFATE AND AMPHETAMINE SULFATE 5; 5; 5; 5 MG/1; MG/1; MG/1; MG/1
20 TABLET ORAL DAILY
Qty: 30 TABLET | Refills: 0 | Status: SHIPPED | OUTPATIENT
Start: 2018-08-07 | End: 2018-09-06

## 2018-08-07 RX ORDER — DEXTROAMPHETAMINE SACCHARATE, AMPHETAMINE ASPARTATE MONOHYDRATE, DEXTROAMPHETAMINE SULFATE AND AMPHETAMINE SULFATE 5; 5; 5; 5 MG/1; MG/1; MG/1; MG/1
20 CAPSULE, EXTENDED RELEASE ORAL DAILY
Qty: 30 CAPSULE | Refills: 0 | Status: SHIPPED | OUTPATIENT
Start: 2018-09-07 | End: 2018-10-07

## 2018-08-07 RX ORDER — DEXTROAMPHETAMINE SACCHARATE, AMPHETAMINE ASPARTATE, DEXTROAMPHETAMINE SULFATE AND AMPHETAMINE SULFATE 5; 5; 5; 5 MG/1; MG/1; MG/1; MG/1
20 TABLET ORAL DAILY
Qty: 30 TABLET | Refills: 0 | Status: SHIPPED | OUTPATIENT
Start: 2018-10-08 | End: 2018-11-07

## 2018-08-07 RX ORDER — LORAZEPAM 0.5 MG/1
TABLET ORAL
Qty: 30 TABLET | Refills: 2 | Status: SHIPPED | OUTPATIENT
Start: 2018-08-07 | End: 2019-01-01

## 2018-08-07 RX ORDER — DEXTROAMPHETAMINE SACCHARATE, AMPHETAMINE ASPARTATE MONOHYDRATE, DEXTROAMPHETAMINE SULFATE AND AMPHETAMINE SULFATE 5; 5; 5; 5 MG/1; MG/1; MG/1; MG/1
20 CAPSULE, EXTENDED RELEASE ORAL DAILY
Qty: 30 CAPSULE | Refills: 0 | Status: SHIPPED | OUTPATIENT
Start: 2018-10-08 | End: 2018-11-07

## 2018-08-07 RX ORDER — DEXTROAMPHETAMINE SACCHARATE, AMPHETAMINE ASPARTATE MONOHYDRATE, DEXTROAMPHETAMINE SULFATE AND AMPHETAMINE SULFATE 5; 5; 5; 5 MG/1; MG/1; MG/1; MG/1
20 CAPSULE, EXTENDED RELEASE ORAL DAILY
Qty: 30 CAPSULE | Refills: 0 | Status: SHIPPED | OUTPATIENT
Start: 2018-08-07 | End: 2018-09-06

## 2018-08-07 RX ORDER — DULOXETIN HYDROCHLORIDE 60 MG/1
60 CAPSULE, DELAYED RELEASE ORAL DAILY
Qty: 90 CAPSULE | Refills: 1 | Status: SHIPPED | OUTPATIENT
Start: 2018-08-07 | End: 2019-04-25

## 2018-08-07 ASSESSMENT — PAIN SCALES - GENERAL: PAINLEVEL: NO PAIN (0)

## 2018-08-07 NOTE — PROGRESS NOTES
"PSYCHIATRY CLINIC PROGRESS NOTE  Medication management & Psychotherapy    IDENTIFICATION:  Saskia Ortega is a 26 year old female with previous psychiatric diagnoses of major depressive disorder, recurrent, generalized anxiety disorder, and ADHD.  Patient presents for ongoing psychiatric follow-up and was seen for initial evaluation on 5/04/2012.    SUBJECTIVE:  The patient was last seen in clinic on 3/27/2018 at which time no medication changes were made.  Since the time of the last visit:     Pt reports that she has been doing \"really good\" since she was last seen.     Relates a long story about how she developed conjunctivitis that she contracted from boyfriend. Describes having significant pain associated with it. Was finally diagnosed as bacterial and was prescribed an antibiotic, however, had significant pain and dysfunction. from it before being properly diagnosed    States taht she and boyfriend are doing well. Discussed how several months ago he had been behaving quite punitively. She feels that things have changed signficantly since then. Is unsure what has changed but no longer feels like she is being punished for an indiscretion that occurred close to 1 year ago. Processed this providers concern about her being \"punished\" and encouraged her to advocate for herself.    Reports that her mood has been mostly okay.    Feels that work is going well. Continues to enjoy her position and feels that she is where she wants to be.    States that current medication regimen is well managing mood and anxiety. Also, feels ADHD symptoms are well-managed with current medication regimen. Sleep continues to be improved with re-addition of lorazepam to medication list, although there have been some hiccups with getting this regularly filled.    Denies other side effects to medication.    Symptoms:  Denies inattention in the afternoon. Denies sleep disruption. Denies infrequent low mood, anhedonia, fatigue, self-derogatory " "thoughts, appetite disturbance, psychomotor slowing or concentration problems.  Denies suicidal ideation.  Ongoing periodic anxiety.   Medication side-effects:  Previously experienced fatigue, weight gain, and loss of libido associated with sertraline as well as fatigue and amotivation with higher dose citalopram.  Medical ROS:     Cardiovascular: negative for palpitations, tachycardia  Gastrointestinal: negative for increased appetite, nausea, vomiting, constipation and diarrhea  Neurologic: negative for headaches and cognitive problems  Psychiatric: negative for sleep disturbance, increased stress, feeling anxious, thoughts of self-harm, agitation and sexual difficulties.    MEDICAL TEAM:         - Primary Medical Provider:  unknown  - Therapist: none currently (previously followed by this provider for supportive psychotherapy)    ALLERGIES: NKDA    MEDICATIONS:  Adderall XR 20 mg daily  Adderall IR 10 mg qAfternoon  Duloxetine 60 mg daily   Lorazepam 0.5 mg daily PRN anxiety/sleep    LABS: no new results  VITALS: /78 (BP Location: Left arm, Patient Position: Chair, Cuff Size: Adult Regular)  Pulse 88  Temp 98.4  F (36.9  C)    OBJECTIVE:   Alert and oriented.  Well groomed, calm, cooperative with good eye contact.  No problems with speech or psychomotor behavior.  Mood was described as \"good.\" Affect was euthymic with improved brightness, congruent to speech content. Thought process was unremarkable and thought content was congruent to speech content, and devoid of suicidal and homicidal ideation and psychotic thought.  No hallucinations.  Insight was good.  Judgment was intact and adequate for safety.  Patient demonstrates no obvious problems with attention, concentration, language, short or long term memory by observation of conversation.  These were not formally tested.  Fund of knowledge was intact.    ASSESSMENT:    Historical:  Saskia Ortega is a 25 year old  female with history of depression " and anxiety who presents for follow-up medication management. She was transitioned from Effexor to Celexa in spring/summer 2013 with good results.  She was previously seen for monthly combination medication management and psychotherapy by her last provider.  She describes obtaining benefit from this and requested to increase frequency of sessions to every other week to work on processing family issues which have become more apparent after beginning a new relationship.  She continued to find benefit from Adderall for ADD and lorazepam as PRN for anxiety. She took them as prescribed and had no issues with substance abuse.  Pt has a history trials of Effexor and citalopram which were both effective for managing depression but discontinued secondary to side effects.  In addition, trial of Vyvanse was attempted during fall 2014, however, pt did not find it as effective as Adderall for management of ADD sx and so was transitioned back to Adderall.  At January 2015 visit, pt described significant worsening of sx of depression and cross-titration from citalopram to sertraline was initiated.  She tolerated transition well and initially felt mood was well managed at 150 mg daily.    Current:  Today, pt reports mood, anxiety, attention, and sleep are well managed with current medication regimen. Denies ongoing difficulties in relationship with boyfriend, however, appears to be navigating this well with appropriate emphasis on maintaining her mental health.    For the future, may consider increasing dose of duloxetine to 90 mg vs trial of escitalopram and/or bupropion should depression symptoms or anxiety worsen.    The risks, benefits, alternatives and potential adverse effects have been explained and are understood by the patient.  The patient agrees to the plan with the capacity to do so.  The patient knows to call the clinic for any problems or access emergency care if needed. The patient is not pregnant.  She is not  abusing substances and shows no evidence for abuse of medication.  Controlled substances are still appropriate and required.  No medical contraindications to treatment.    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 (Refills x 3 months provided today, 8/7/2018)  -- Continue duloxetine 60 mg daily.  (Refills x 3 months provided today, 8/7/2018).  -- Continue lorazepam 0.5 mg daily PRN anxiety/insomnia.  (Refills x 3 months provided today, 8/7/2018).  -- Continue Adderall XR 20 mg daily. ( Refills x 3 months provided today, 8/7/2018).  Psychotherapy:   Will continue with combined psychotherapy and medication management every 3 months.  RTC:  3 months for MFU/  Labs/Monitoring:    -- Recommend pt's weight NOT be checked prior to appointments.  -- Continue to monitor BP while on [facility]        PSYCHIATRY CLINIC INDIVIDUAL PSYCHOTHERAPY NOTE                                                   [16]   Start time: 4:46pm  End time: 5:25pm    Date reviewed: 8/7/2018       Date next due: 11/07/2018  Subjective: This supportive psychotherapy session addressed issues related to patient's history, current stressors, life stressors, relationships and work .  Patient's reaction: Preparatory in the context of mental status appropriate for ambulatory setting.  Progress: good  Plan: RTC 1 month  Psychotherapy services during this visit included  myself and Saskia Ortega.   TREATMENT  PLAN          SYMPTOMS; PROBLEMS   MEASURABLE GOALS;    FUNCTIONAL IMPROVEMENT INTERVENTIONS;   GAINS MADE DISCHARGE CRITERIA   Depression: depressed mood   report feeling more positive about self  strength focus marked symptom improvement   ADHD: inattention; Paying  attention to details and Listening    develop strategies for thought distraction when ruminating strength focus marked symptom improvement         PROVIDER:  MD MITCHEL Mcmanus MD  [facility]  Department of Psychiatry

## 2018-08-07 NOTE — MR AVS SNAPSHOT
After Visit Summary   8/7/2018    Saskia Ortega    MRN: [id number]           Patient Information     Date Of Birth          1990        Visit Information        Provider Department      8/7/2018 4:30 PM Yessica Wilkins MD [facility]        Today's Diagnoses     Attention-deficit hyperactivity disorder, predominantly inattentive type    -  1    Major depressive disorder, recurrent episode, moderate (H)           Follow-ups after your visit        Follow-up notes from your care team     Return in about 3 months (around 11/7/2018).      Who to contact     Please call your clinic at [phone] to:    Ask questions about your health    Make or cancel appointments    Discuss your medicines    Learn about your test results    Speak to your doctor            Additional Information About Your Visit        MyChart Information     [employer] gives you secure access to your electronic health record. If you see a primary care provider, you can also send messages to your care team and make appointments. If you have questions, please call your primary care clinic.  If you do not have a primary care provider, please call [phone] and they will assist you.      [employer] is an electronic gateway that provides easy, online access to your medical records. With [employer], you can request a clinic appointment, read your test results, renew a prescription or communicate with your care team.     To access your existing account, please contact your [facility] or call [phone] for assistance.        Care EveryWhere ID     This is your Care EveryWhere ID. This could be used by other organizations to access your [place] medical records  [id number]        Your Vitals Were     Pulse Temperature                88 98.4  F (36.9  C)           Blood Pressure from Last 3 Encounters:   08/07/18 113/78   03/27/18 119/72   12/22/17 110/62    Weight from Last 3 Encounters:   12/22/17  66.7 kg (147 lb)   12/15/17 67.6 kg (149 lb)   11/23/15 61.2 kg (135 lb)              Today, you had the following     No orders found for display         Today's Medication Changes          These changes are accurate as of 8/7/18 11:59 PM.  If you have any questions, ask your nurse or doctor.               These medicines have changed or have updated prescriptions.        Dose/Directions    * amphetamine-dextroamphetamine 20 MG per tablet   Commonly known as:  ADDERALL   This may have changed:  Another medication with the same name was added. Make sure you understand how and when to take each.   Used for:  Attention-deficit hyperactivity disorder, predominantly inattentive type   Changed by:  Yessica Wilkins MD        Take 1 tab (20 mg) every afternoon.   Quantity:  30 tablet   Refills:  0       * amphetamine-dextroamphetamine 20 MG per 24 hr capsule   Commonly known as:  ADDERALL XR   This may have changed:  You were already taking a medication with the same name, and this prescription was added. Make sure you understand how and when to take each.   Used for:  Attention-deficit hyperactivity disorder, predominantly inattentive type   Changed by:  Yessica Wilkins MD        Dose:  20 mg   Take 1 capsule (20 mg) by mouth daily   Quantity:  30 capsule   Refills:  0       * amphetamine-dextroamphetamine 20 MG per tablet   Commonly known as:  ADDERALL   This may have changed:  You were already taking a medication with the same name, and this prescription was added. Make sure you understand how and when to take each.   Used for:  Attention-deficit hyperactivity disorder, predominantly inattentive type   Changed by:  Yessica Wilkins MD        Dose:  20 mg   Take 1 tablet (20 mg) by mouth daily   Quantity:  30 tablet   Refills:  0       * amphetamine-dextroamphetamine 20 MG per 24 hr capsule   Commonly known as:  ADDERALL XR   This may have changed:  You were already taking a medication with the  same name, and this prescription was added. Make sure you understand how and when to take each.   Used for:  Attention-deficit hyperactivity disorder, predominantly inattentive type   Changed by:  Yessica Wilkins MD        Dose:  20 mg   Start taking on:  9/7/2018   Take 1 capsule (20 mg) by mouth daily   Quantity:  30 capsule   Refills:  0       * amphetamine-dextroamphetamine 20 MG per tablet   Commonly known as:  ADDERALL   This may have changed:  You were already taking a medication with the same name, and this prescription was added. Make sure you understand how and when to take each.   Used for:  Attention-deficit hyperactivity disorder, predominantly inattentive type   Changed by:  Yessica Wilkins MD        Dose:  20 mg   Start taking on:  9/7/2018   Take 1 tablet (20 mg) by mouth daily   Quantity:  30 tablet   Refills:  0       * amphetamine-dextroamphetamine 20 MG per 24 hr capsule   Commonly known as:  ADDERALL XR   This may have changed:  You were already taking a medication with the same name, and this prescription was added. Make sure you understand how and when to take each.   Used for:  Attention-deficit hyperactivity disorder, predominantly inattentive type   Changed by:  Yessica Wilkins MD        Dose:  20 mg   Start taking on:  10/8/2018   Take 1 capsule (20 mg) by mouth daily   Quantity:  30 capsule   Refills:  0       * amphetamine-dextroamphetamine 20 MG per tablet   Commonly known as:  ADDERALL   This may have changed:  You were already taking a medication with the same name, and this prescription was added. Make sure you understand how and when to take each.   Used for:  Attention-deficit hyperactivity disorder, predominantly inattentive type   Changed by:  Yessica Wilkins MD        Dose:  20 mg   Start taking on:  10/8/2018   Take 1 tablet (20 mg) by mouth daily   Quantity:  30 tablet   Refills:  0       * Notice:  This list has 7 medication(s) that are the  same as other medications prescribed for you. Read the directions carefully, and ask your doctor or other care provider to review them with you.         Where to get your medicines      These medications were sent to [facility] [id number] [facility] - [place], [place] - [address]  [address] [address]     Phone:  [phone]     DULoxetine 60 MG EC capsule         Some of these will need a paper prescription and others can be bought over the counter.  Ask your nurse if you have questions.     Bring a paper prescription for each of these medications     amphetamine-dextroamphetamine 20 MG per 24 hr capsule    amphetamine-dextroamphetamine 20 MG per 24 hr capsule    amphetamine-dextroamphetamine 20 MG per 24 hr capsule    amphetamine-dextroamphetamine 20 MG per tablet    amphetamine-dextroamphetamine 20 MG per tablet    amphetamine-dextroamphetamine 20 MG per tablet    LORazepam 0.5 MG tablet                Primary Care Provider    None Specified       No primary provider on file.        Equal Access to Services     MADISYN DE LA O : Marquise Olivas, evens lal, vashti rowe, cayla eng . [facility] [phone].    ATENCIÓN: Si habla español, tiene a espitia disposición servicios gratuitos de asistencia lingüística. Ubaldoame al [phone].    We comply with applicable federal civil rights laws and [place] laws. We do not discriminate on the basis of race, color, national origin, age, disability, sex, sexual orientation, or gender identity.            Thank you!     Thank you for choosing [facility]  for your care. Our goal is always to provide you with excellent care. Hearing back from our patients is one way we can continue to improve our services. Please take a few minutes to complete the written survey that you may receive in the mail after your visit with us. Thank you!             Your Updated Medication List - Protect others around  you: Learn how to safely use, store and throw away your medicines at www.disposemymeds.org.          This list is accurate as of 8/7/18 11:59 PM.  Always use your most recent med list.                   Brand Name Dispense Instructions for use Diagnosis    * amphetamine-dextroamphetamine 20 MG per tablet    ADDERALL    30 tablet    Take 1 tab (20 mg) every afternoon.    Attention-deficit hyperactivity disorder, predominantly inattentive type       * amphetamine-dextroamphetamine 20 MG per 24 hr capsule    ADDERALL XR    30 capsule    Take 1 capsule (20 mg) by mouth daily    Attention-deficit hyperactivity disorder, predominantly inattentive type       * amphetamine-dextroamphetamine 20 MG per tablet    ADDERALL    30 tablet    Take 1 tablet (20 mg) by mouth daily    Attention-deficit hyperactivity disorder, predominantly inattentive type       * amphetamine-dextroamphetamine 20 MG per 24 hr capsule   Start taking on:  9/7/2018    ADDERALL XR    30 capsule    Take 1 capsule (20 mg) by mouth daily    Attention-deficit hyperactivity disorder, predominantly inattentive type       * amphetamine-dextroamphetamine 20 MG per tablet   Start taking on:  9/7/2018    ADDERALL    30 tablet    Take 1 tablet (20 mg) by mouth daily    Attention-deficit hyperactivity disorder, predominantly inattentive type       * amphetamine-dextroamphetamine 20 MG per 24 hr capsule   Start taking on:  10/8/2018    ADDERALL XR    30 capsule    Take 1 capsule (20 mg) by mouth daily    Attention-deficit hyperactivity disorder, predominantly inattentive type       * amphetamine-dextroamphetamine 20 MG per tablet   Start taking on:  10/8/2018    ADDERALL    30 tablet    Take 1 tablet (20 mg) by mouth daily    Attention-deficit hyperactivity disorder, predominantly inattentive type       DULoxetine 60 MG EC capsule    CYMBALTA    90 capsule    Take 1 capsule (60 mg) by mouth daily    Major depressive disorder, recurrent episode, moderate (H)        etonogestrel 68 MG Impl    IMPLANON/NEXPLANON    1 each    1 each (68 mg) by Subdermal route once for 1 dose Lot [id number], Exp 05/2020    Nexplanon insertion       LORazepam 0.5 MG tablet    ATIVAN    30 tablet    Take 1 tablet by mouth daily as needed for anxiety.    Major depressive disorder, recurrent episode, moderate (H)       * Notice:  This list has 7 medication(s) that are the same as other medications prescribed for you. Read the directions carefully, and ask your doctor or other care provider to review them with you.

## 2018-08-08 ASSESSMENT — PATIENT HEALTH QUESTIONNAIRE - PHQ9: SUM OF ALL RESPONSES TO PHQ QUESTIONS 1-9: 3

## 2019-01-01 ENCOUNTER — MYC REFILL (OUTPATIENT)
Dept: PSYCHIATRY | Facility: CLINIC | Age: 29
End: 2019-01-01

## 2019-01-01 DIAGNOSIS — F90.0 ATTENTION-DEFICIT HYPERACTIVITY DISORDER, PREDOMINANTLY INATTENTIVE TYPE: ICD-10-CM

## 2019-01-01 DIAGNOSIS — F33.1 MAJOR DEPRESSIVE DISORDER, RECURRENT EPISODE, MODERATE (H): ICD-10-CM

## 2019-01-03 RX ORDER — DEXTROAMPHETAMINE SACCHARATE, AMPHETAMINE ASPARTATE, DEXTROAMPHETAMINE SULFATE AND AMPHETAMINE SULFATE 5; 5; 5; 5 MG/1; MG/1; MG/1; MG/1
TABLET ORAL
Qty: 30 TABLET | Refills: 0 | Status: SHIPPED | OUTPATIENT
Start: 2019-01-03 | End: 2019-03-21

## 2019-01-03 RX ORDER — LORAZEPAM 0.5 MG/1
TABLET ORAL
Qty: 30 TABLET | Refills: 0 | Status: SHIPPED | OUTPATIENT
Start: 2019-01-03 | End: 2019-02-05

## 2019-01-03 NOTE — TELEPHONE ENCOUNTER
Last seen: 8/7/18  RTC: 3 months   Cancel: none  No-show: none  Next appt: none schedule    Incoming refill from patient via mychart    Medication requested: Adderall 20 mg   Directions: Take 1 tab every afternoon   Qty: 30  Last refilled: 10/8/18    **Routed to provider, possible break in administration         Last seen: 8/7/18  RTC: 3 months   Cancel: none  No-show: none  Next appt: none schedule    Incoming refill from patient via mychart    Medication requested: Lorazepam 0.5 mg    Directions: Take 1 tab by mouth daily as needed for anxiety   Qty: 30  Last refilled: 8/7/18 with two refills     **Routed to provider, possible break in administration

## 2019-01-19 ENCOUNTER — MYC MEDICAL ADVICE (OUTPATIENT)
Dept: OBGYN | Facility: CLINIC | Age: 29
End: 2019-01-19

## 2019-01-31 DIAGNOSIS — F90.0 ATTENTION-DEFICIT HYPERACTIVITY DISORDER, PREDOMINANTLY INATTENTIVE TYPE: ICD-10-CM

## 2019-01-31 RX ORDER — DEXTROAMPHETAMINE SACCHARATE, AMPHETAMINE ASPARTATE MONOHYDRATE, DEXTROAMPHETAMINE SULFATE AND AMPHETAMINE SULFATE 5; 5; 5; 5 MG/1; MG/1; MG/1; MG/1
20 CAPSULE, EXTENDED RELEASE ORAL DAILY
Qty: 30 CAPSULE | Refills: 0 | Status: SHIPPED | OUTPATIENT
Start: 2019-01-31 | End: 2019-06-24

## 2019-01-31 RX ORDER — DEXTROAMPHETAMINE SACCHARATE, AMPHETAMINE ASPARTATE, DEXTROAMPHETAMINE SULFATE AND AMPHETAMINE SULFATE 5; 5; 5; 5 MG/1; MG/1; MG/1; MG/1
20 TABLET ORAL DAILY
Qty: 30 TABLET | Refills: 0 | Status: SHIPPED | OUTPATIENT
Start: 2019-01-31 | End: 2019-03-21

## 2019-01-31 NOTE — TELEPHONE ENCOUNTER
-Refills sent out in mail as patient will be out prior to appointment and there are no sooner appointments are available.

## 2019-02-04 ENCOUNTER — OFFICE VISIT (OUTPATIENT)
Dept: OBGYN | Facility: CLINIC | Age: 29
End: 2019-02-04
Payer: COMMERCIAL

## 2019-02-04 VITALS
DIASTOLIC BLOOD PRESSURE: 70 MMHG | HEART RATE: 80 BPM | SYSTOLIC BLOOD PRESSURE: 112 MMHG | BODY MASS INDEX: 23.11 KG/M2 | WEIGHT: 143.2 LBS

## 2019-02-04 DIAGNOSIS — Z30.432 ENCOUNTER FOR REMOVAL OF INTRAUTERINE CONTRACEPTIVE DEVICE: Primary | ICD-10-CM

## 2019-02-04 DIAGNOSIS — Z30.09 GENERAL COUNSELLING AND ADVICE ON CONTRACEPTION: ICD-10-CM

## 2019-02-04 PROCEDURE — 99213 OFFICE O/P EST LOW 20 MIN: CPT | Mod: 25 | Performed by: OBSTETRICS & GYNECOLOGY

## 2019-02-04 PROCEDURE — 11982 REMOVE DRUG IMPLANT DEVICE: CPT | Performed by: OBSTETRICS & GYNECOLOGY

## 2019-02-04 RX ORDER — ETONOGESTREL AND ETHINYL ESTRADIOL VAGINAL RING .015; .12 MG/D; MG/D
1 RING VAGINAL
Qty: 3 EACH | Refills: 4 | Status: SHIPPED | OUTPATIENT
Start: 2019-02-04 | End: 2020-08-11

## 2019-02-04 NOTE — PROGRESS NOTES
Nexplanon Removal:     Is a pregnancy test required: No.  Was a consent obtained?  Yes    Saskia Ortega is here for removal of etonogestrel implant Nexplanon/Implanon    Indication: Side effects    Has noticed more HAs over the last year and irregular bleeding, inconsistent periods. Has always had irregular periods but now with HAs is too much.  Not sure what she wants to do long term for contraception, thinking pills. Has only been on pills in past.   No hx of issues with period frequency/bleeding. Does get HAs with periods.   Review of PMH, SocHx, SurHx, FHx, medications completed. Epic updated.      Preoperative Diagnosis: etonogestrel implant  Postoperative Diagnosis: etonogestrel implant removed    Technique: On the left arm  Skin prep Betadine  Anesthesia 1% lidocaine, without epi  Procedure: Small incision (<5mm) was made at distal end of palpable implant, curved hemostat or mosquito forceps was used to isolate the implant and bring it to the incision, the fibrous capsule containing the implant  was incised and the Implant was removed intact.      EBL: minimal  Complications:  No  Tolerance:  Pt tolerated procedure well and was in stable condition.   Dressing:    A pressure bandage was placed for the next 12-24 hours.    Contraception was discussed and patient chose the following method- ring  Discussed contraceptive options, risks and benefits including VTE with estrogen containing meds. Discussed proper usage. Will trial Nuvaring. Start monthly cycling. If HAs still an issue, can start skipping periods. Discussed how this is done with the ring.   Questions asnwered.   Discussed loss of contraception now that Nexplanon removed. Rec back up (condoms) until is stabilized on Nuvaring. First month of nuvaring should also use condoms.      Follow up: Pt was instructed to call if bleeding, severe pain or foul smell.     Zee Bautista Masters, DO

## 2019-02-05 ENCOUNTER — OFFICE VISIT (OUTPATIENT)
Dept: PSYCHIATRY | Facility: CLINIC | Age: 29
End: 2019-02-05
Payer: COMMERCIAL

## 2019-02-05 VITALS — DIASTOLIC BLOOD PRESSURE: 79 MMHG | SYSTOLIC BLOOD PRESSURE: 130 MMHG | HEART RATE: 88 BPM

## 2019-02-05 DIAGNOSIS — F33.1 MAJOR DEPRESSIVE DISORDER, RECURRENT EPISODE, MODERATE (H): ICD-10-CM

## 2019-02-05 DIAGNOSIS — F90.0 ATTENTION-DEFICIT HYPERACTIVITY DISORDER, PREDOMINANTLY INATTENTIVE TYPE: Primary | ICD-10-CM

## 2019-02-05 RX ORDER — DEXTROAMPHETAMINE SACCHARATE, AMPHETAMINE ASPARTATE MONOHYDRATE, DEXTROAMPHETAMINE SULFATE AND AMPHETAMINE SULFATE 5; 5; 5; 5 MG/1; MG/1; MG/1; MG/1
20 CAPSULE, EXTENDED RELEASE ORAL DAILY
Qty: 30 CAPSULE | Refills: 0 | Status: SHIPPED | OUTPATIENT
Start: 2019-03-08 | End: 2019-03-21

## 2019-02-05 RX ORDER — DEXTROAMPHETAMINE SACCHARATE, AMPHETAMINE ASPARTATE, DEXTROAMPHETAMINE SULFATE AND AMPHETAMINE SULFATE 5; 5; 5; 5 MG/1; MG/1; MG/1; MG/1
20 TABLET ORAL DAILY
Qty: 30 TABLET | Refills: 0 | Status: SHIPPED | OUTPATIENT
Start: 2019-04-08 | End: 2019-05-08

## 2019-02-05 RX ORDER — DEXTROAMPHETAMINE SACCHARATE, AMPHETAMINE ASPARTATE, DEXTROAMPHETAMINE SULFATE AND AMPHETAMINE SULFATE 5; 5; 5; 5 MG/1; MG/1; MG/1; MG/1
20 TABLET ORAL DAILY
Qty: 30 TABLET | Refills: 0 | Status: SHIPPED | OUTPATIENT
Start: 2019-03-08 | End: 2019-04-07

## 2019-02-05 RX ORDER — LORAZEPAM 0.5 MG/1
TABLET ORAL
Qty: 30 TABLET | Refills: 2 | Status: SHIPPED | OUTPATIENT
Start: 2019-02-05 | End: 2019-05-01

## 2019-02-05 RX ORDER — DEXTROAMPHETAMINE SACCHARATE, AMPHETAMINE ASPARTATE MONOHYDRATE, DEXTROAMPHETAMINE SULFATE AND AMPHETAMINE SULFATE 5; 5; 5; 5 MG/1; MG/1; MG/1; MG/1
20 CAPSULE, EXTENDED RELEASE ORAL DAILY
Qty: 30 CAPSULE | Refills: 0 | Status: SHIPPED | OUTPATIENT
Start: 2019-04-08 | End: 2019-03-21

## 2019-02-05 SDOH — HEALTH STABILITY: MENTAL HEALTH: HOW OFTEN DO YOU HAVE 6 OR MORE DRINKS ON ONE OCCASION?: MONTHLY

## 2019-02-05 NOTE — PROGRESS NOTES
"PSYCHIATRY CLINIC PROGRESS NOTE  Medication management & Psychotherapy    IDENTIFICATION:  Saskia Ortega is a 26 year old female with previous psychiatric diagnoses of major depressive disorder, recurrent, generalized anxiety disorder, and ADHD.  Patient presents for ongoing psychiatric follow-up and was seen for initial evaluation on 5/04/2012.    SUBJECTIVE:  The patient was last seen in clinic on 8/07/2019 at which time no medication changes were made.  Since the time of the last visit:     Pt reports that she has been doing \"really good\" since she was last seen.     Continues to work and enjoy her job.    Reports that relationship with boyfriend is much better than when she was last seen. She reports that he stopped smoking cannabis shortly after the beginning of the year. She states that she has noticed a significant improvement in his behavior (more patient and is better about communicating with her).    Also states that relationship with boyfriend's daughter has been good although she is unsure if they will every be close.    She reports that sleep is better, using lorazepam as needed. Describes having increased anxiety associated with crowds and driving. States this likely started before Thanksgiving. Discussed increasing dose of duloxetine to 90 mg, however, she preferred to explore behioral strategies for managing anxiety. Discussed TIPP skills. Also processed how anxiety appears to center around fears of losing people/pets she loves. Processed in context of father's death when she was a young girl. Suggested she consider speaking with \"younger self\" to allay fears that she will be \"destroyed\" by the loss of a loved one.    States that current medication regimen is well managing mood and anxiety. Also, feels ADHD symptoms are well-managed with current medication regimen. Sleep continues to be improved with re-addition of lorazepam to medication list.    Denies other side effects to medication.    Symptoms:  " "Denies inattention in the afternoon. Denies sleep disruption. Denies infrequent low mood, anhedonia, fatigue, self-derogatory thoughts, appetite disturbance, psychomotor slowing or concentration problems.  Denies suicidal ideation.  Ongoing periodic anxiety.   Medication side-effects:  Previously experienced fatigue, weight gain, and loss of libido associated with sertraline as well as fatigue and amotivation with higher dose citalopram.  Medical ROS:     Cardiovascular: negative for palpitations, tachycardia  Gastrointestinal: negative for increased appetite, nausea, vomiting, constipation and diarrhea  Neurologic: negative for headaches and cognitive problems  Psychiatric: negative for sleep disturbance, increased stress, feeling anxious, thoughts of self-harm, agitation and sexual difficulties.    MEDICAL TEAM:         - Primary Medical Provider:  unknown  - Therapist: none currently (previously followed by this provider for supportive psychotherapy)    ALLERGIES: NKDA    MEDICATIONS:  Adderall XR 20 mg daily  Adderall IR 10 mg qAfternoon  Duloxetine 60 mg daily   Lorazepam 0.5 mg daily PRN anxiety/sleep    LABS: no new results  VITALS: /79 (BP Location: Right arm, Patient Position: Sitting, Cuff Size: Adult Regular)   Pulse 88     OBJECTIVE:   Alert and oriented.  Well groomed, calm, cooperative with good eye contact.  No problems with speech or psychomotor behavior.  Mood was described as \"good.\" Affect was euthymic with improved brightness, congruent to speech content. Thought process was unremarkable and thought content was congruent to speech content, and devoid of suicidal and homicidal ideation and psychotic thought.  No hallucinations.  Insight was good.  Judgment was intact and adequate for safety.  Patient demonstrates no obvious problems with attention, concentration, language, short or long term memory by observation of conversation.  These were not formally tested.  Fund of knowledge was " intact.    ASSESSMENT:    Historical:  Saskia Ortega is a 25 year old  female with history of depression and anxiety who presents for follow-up medication management. She was transitioned from Effexor to Celexa in spring/summer 2013 with good results.  She was previously seen for monthly combination medication management and psychotherapy by her last provider.  She describes obtaining benefit from this and requested to increase frequency of sessions to every other week to work on processing family issues which have become more apparent after beginning a new relationship.  She continued to find benefit from Adderall for ADD and lorazepam as PRN for anxiety. She took them as prescribed and had no issues with substance abuse.  Pt has a history trials of Effexor and citalopram which were both effective for managing depression but discontinued secondary to side effects.  In addition, trial of Vyvanse was attempted during fall 2014, however, pt did not find it as effective as Adderall for management of ADD sx and so was transitioned back to Adderall.  At January 2015 visit, pt described significant worsening of sx of depression and cross-titration from citalopram to sertraline was initiated.  She tolerated transition well and initially felt mood was well managed at 150 mg daily.    Current:  Today, pt reports mood, anxiety, attention, and sleep are well managed with current medication regimen. Denies ongoing difficulties in relationship with boyfriend, however, appears to be navigating this well with appropriate emphasis on maintaining her mental health.    For the future, may consider increasing dose of duloxetine to 90 mg vs trial of escitalopram and/or bupropion should depression symptoms or anxiety worsen.    The risks, benefits, alternatives and potential adverse effects have been explained and are understood by the patient.  The patient agrees to the plan with the capacity to do so.  The patient knows to call  the clinic for any problems or access emergency care if needed. The patient is not pregnant.  She is not abusing substances and shows no evidence for abuse of medication.  Controlled substances are still appropriate and required.  No medical contraindications to treatment.    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 (Refills x 3 months provided today, 8/7/2018)  -- Continue duloxetine 60 mg daily.  (Refills x 3 months provided today, 8/7/2018).  -- Continue lorazepam 0.5 mg daily PRN anxiety/insomnia.  (Refills x 3 months provided today, 8/7/2018).  -- Continue Adderall XR 20 mg daily. ( Refills x 3 months provided today, 8/7/2018).  Psychotherapy:   Will continue with combined psychotherapy and medication management every 3 months.  RTC:  3 months for MFU/  Labs/Monitoring:    -- Recommend pt's weight NOT be checked prior to appointments.  -- Continue to monitor BP while on [facility]        PSYCHIATRY CLINIC INDIVIDUAL PSYCHOTHERAPY NOTE                                                   [16]   Start time: 3:40pm  End time: 4:30pm    Date reviewed: 2/5/2019       Date next due: 5/05/2019  Subjective: This supportive psychotherapy session addressed issues related to patient's history, current stressors, life stressors, relationships and work .  Patient's reaction: Preparatory in the context of mental status appropriate for ambulatory setting.  Progress: good  Plan: RTC 1 month  Psychotherapy services during this visit included  myself and Saskia Ortega.   TREATMENT  PLAN          SYMPTOMS; PROBLEMS   MEASURABLE GOALS;    FUNCTIONAL IMPROVEMENT INTERVENTIONS;   GAINS MADE DISCHARGE CRITERIA   Depression: depressed mood   report  feeling more positive about self  strength focus marked symptom improvement   ADHD: inattention; Paying attention to details and Listening    develop strategies for thought distraction when ruminating strength focus marked symptom improvement         PROVIDER:  MD MITCHEL Mcmanus MD  [facility]  Department of Psychiatry

## 2019-02-06 ASSESSMENT — PATIENT HEALTH QUESTIONNAIRE - PHQ9: SUM OF ALL RESPONSES TO PHQ QUESTIONS 1-9: 1

## 2019-03-19 ENCOUNTER — MYC MEDICAL ADVICE (OUTPATIENT)
Dept: PSYCHIATRY | Facility: CLINIC | Age: 29
End: 2019-03-19

## 2019-03-19 DIAGNOSIS — F90.0 ATTENTION-DEFICIT HYPERACTIVITY DISORDER, PREDOMINANTLY INATTENTIVE TYPE: Primary | ICD-10-CM

## 2019-03-21 RX ORDER — DEXTROAMPHETAMINE SACCHARATE, AMPHETAMINE ASPARTATE, DEXTROAMPHETAMINE SULFATE AND AMPHETAMINE SULFATE 5; 5; 5; 5 MG/1; MG/1; MG/1; MG/1
TABLET ORAL
Qty: 30 TABLET | Refills: 0 | Status: SHIPPED | OUTPATIENT
Start: 2019-04-19 | End: 2019-06-24

## 2019-03-21 RX ORDER — DEXTROAMPHETAMINE SACCHARATE, AMPHETAMINE ASPARTATE MONOHYDRATE, DEXTROAMPHETAMINE SULFATE AND AMPHETAMINE SULFATE 5; 5; 5; 5 MG/1; MG/1; MG/1; MG/1
20 CAPSULE, EXTENDED RELEASE ORAL DAILY
Qty: 30 CAPSULE | Refills: 0 | Status: SHIPPED | OUTPATIENT
Start: 2019-04-08 | End: 2020-01-23

## 2019-03-21 RX ORDER — DEXTROAMPHETAMINE SACCHARATE, AMPHETAMINE ASPARTATE, DEXTROAMPHETAMINE SULFATE AND AMPHETAMINE SULFATE 5; 5; 5; 5 MG/1; MG/1; MG/1; MG/1
20 TABLET ORAL DAILY
Qty: 30 TABLET | Refills: 0 | Status: SHIPPED | OUTPATIENT
Start: 2019-03-21 | End: 2020-01-23

## 2019-03-21 RX ORDER — DEXTROAMPHETAMINE SACCHARATE, AMPHETAMINE ASPARTATE MONOHYDRATE, DEXTROAMPHETAMINE SULFATE AND AMPHETAMINE SULFATE 5; 5; 5; 5 MG/1; MG/1; MG/1; MG/1
20 CAPSULE, EXTENDED RELEASE ORAL DAILY
Qty: 30 CAPSULE | Refills: 0 | Status: SHIPPED | OUTPATIENT
Start: 2019-04-19 | End: 2020-01-23

## 2019-04-25 DIAGNOSIS — F33.1 MAJOR DEPRESSIVE DISORDER, RECURRENT EPISODE, MODERATE (H): ICD-10-CM

## 2019-04-28 NOTE — TELEPHONE ENCOUNTER
Medication requested: DULoxetine (CYMBALTA) 60 MG EC capsule  Last refilled: 1/20/19  Qty: 90      Last seen: 2/11/19  RTC: 2 months  Cancel: 1  No-show: 0  Next appt: not scheduled    Refill decision:   Refill pended and routed to the provider for review/determination due to   CANCEL X 1

## 2019-04-30 RX ORDER — DULOXETIN HYDROCHLORIDE 60 MG/1
60 CAPSULE, DELAYED RELEASE ORAL DAILY
Qty: 30 CAPSULE | Refills: 0 | Status: SHIPPED | OUTPATIENT
Start: 2019-04-30 | End: 2019-06-04

## 2019-05-01 ENCOUNTER — MYC REFILL (OUTPATIENT)
Dept: PSYCHIATRY | Facility: CLINIC | Age: 29
End: 2019-05-01

## 2019-05-01 DIAGNOSIS — F33.1 MAJOR DEPRESSIVE DISORDER, RECURRENT EPISODE, MODERATE (H): ICD-10-CM

## 2019-05-01 RX ORDER — DULOXETIN HYDROCHLORIDE 60 MG/1
60 CAPSULE, DELAYED RELEASE ORAL DAILY
Qty: 30 CAPSULE | Refills: 0 | OUTPATIENT
Start: 2019-05-01

## 2019-05-01 NOTE — TELEPHONE ENCOUNTER
Last seen: 2/5/19  RTC: 2 months  Cancel: none  No-show: none  Next appt: none    Incoming refill from patient  via mychart    Medication requested: Lorazepam 0.5 mg   Directions: Take 1 tablet by mouth daily as needed for anxiety   Qty: 30  Last refilled: 25/19 with two refills    Routed to provider, controlled substance

## 2019-05-02 RX ORDER — LORAZEPAM 0.5 MG/1
TABLET ORAL
Qty: 30 TABLET | Refills: 2 | Status: SHIPPED | OUTPATIENT
Start: 2019-05-02 | End: 2019-11-06

## 2019-05-07 DIAGNOSIS — F33.1 MAJOR DEPRESSIVE DISORDER, RECURRENT EPISODE, MODERATE (H): ICD-10-CM

## 2019-05-08 RX ORDER — DULOXETIN HYDROCHLORIDE 60 MG/1
CAPSULE, DELAYED RELEASE ORAL
Qty: 90 CAPSULE | Refills: 1 | OUTPATIENT
Start: 2019-05-08

## 2019-06-04 DIAGNOSIS — F33.1 MAJOR DEPRESSIVE DISORDER, RECURRENT EPISODE, MODERATE (H): ICD-10-CM

## 2019-06-05 NOTE — TELEPHONE ENCOUNTER
Medication requested: DULoxetine (CYMBALTA) 60 MG capsule  Last refilled: 5/8/19  Qty: 30      Last seen: 2/5/19  RTC: 2 months  Cancel: 1  No-show: 0  Next appt: 7/9/19    Refill decision:   Refill pended and routed to the provider for review/determination due to   Cancel x 1  Pt outside of RTC timeframe.

## 2019-06-06 RX ORDER — DULOXETIN HYDROCHLORIDE 60 MG/1
60 CAPSULE, DELAYED RELEASE ORAL DAILY
Qty: 35 CAPSULE | Refills: 0 | Status: SHIPPED | OUTPATIENT
Start: 2019-06-06 | End: 2019-07-09

## 2019-06-24 DIAGNOSIS — F90.0 ATTENTION-DEFICIT HYPERACTIVITY DISORDER, PREDOMINANTLY INATTENTIVE TYPE: ICD-10-CM

## 2019-06-24 RX ORDER — DEXTROAMPHETAMINE SACCHARATE, AMPHETAMINE ASPARTATE MONOHYDRATE, DEXTROAMPHETAMINE SULFATE AND AMPHETAMINE SULFATE 5; 5; 5; 5 MG/1; MG/1; MG/1; MG/1
20 CAPSULE, EXTENDED RELEASE ORAL DAILY
Qty: 30 CAPSULE | Refills: 0 | Status: SHIPPED | OUTPATIENT
Start: 2019-06-24 | End: 2019-08-05

## 2019-06-24 RX ORDER — DEXTROAMPHETAMINE SACCHARATE, AMPHETAMINE ASPARTATE, DEXTROAMPHETAMINE SULFATE AND AMPHETAMINE SULFATE 5; 5; 5; 5 MG/1; MG/1; MG/1; MG/1
TABLET ORAL
Qty: 30 TABLET | Refills: 0 | Status: SHIPPED | OUTPATIENT
Start: 2019-06-24 | End: 2019-08-05

## 2019-07-09 DIAGNOSIS — F33.1 MAJOR DEPRESSIVE DISORDER, RECURRENT EPISODE, MODERATE (H): ICD-10-CM

## 2019-07-09 RX ORDER — DULOXETIN HYDROCHLORIDE 60 MG/1
60 CAPSULE, DELAYED RELEASE ORAL DAILY
Qty: 35 CAPSULE | Refills: 0 | Status: SHIPPED | OUTPATIENT
Start: 2019-07-09 | End: 2019-08-19

## 2019-07-09 NOTE — TELEPHONE ENCOUNTER
Last seen: 2/5/19  RTC: 2 months  Cancel: 4/9/19, 7/9/19  No-show: none  Next appt: 7/23/19    Incoming refill from Pharmacy via fax    Medication requested: Duloxetine 60 mg   Directions: Take 1 capsule by mouth daily   Qty: 35  Last refilled: 6/6/19    Medication refill approved per refill protocol

## 2019-08-05 ENCOUNTER — MYC REFILL (OUTPATIENT)
Dept: PSYCHIATRY | Facility: CLINIC | Age: 29
End: 2019-08-05

## 2019-08-05 DIAGNOSIS — F90.0 ATTENTION-DEFICIT HYPERACTIVITY DISORDER, PREDOMINANTLY INATTENTIVE TYPE: ICD-10-CM

## 2019-08-08 RX ORDER — DEXTROAMPHETAMINE SACCHARATE, AMPHETAMINE ASPARTATE, DEXTROAMPHETAMINE SULFATE AND AMPHETAMINE SULFATE 5; 5; 5; 5 MG/1; MG/1; MG/1; MG/1
TABLET ORAL
Qty: 30 TABLET | Refills: 0 | Status: SHIPPED | OUTPATIENT
Start: 2019-08-08 | End: 2019-09-29

## 2019-08-08 RX ORDER — DEXTROAMPHETAMINE SACCHARATE, AMPHETAMINE ASPARTATE MONOHYDRATE, DEXTROAMPHETAMINE SULFATE AND AMPHETAMINE SULFATE 5; 5; 5; 5 MG/1; MG/1; MG/1; MG/1
20 CAPSULE, EXTENDED RELEASE ORAL DAILY
Qty: 30 CAPSULE | Refills: 0 | Status: SHIPPED | OUTPATIENT
Start: 2019-08-08 | End: 2019-09-29

## 2019-08-19 ENCOUNTER — MYC REFILL (OUTPATIENT)
Dept: PSYCHIATRY | Facility: CLINIC | Age: 29
End: 2019-08-19

## 2019-08-19 DIAGNOSIS — F33.1 MAJOR DEPRESSIVE DISORDER, RECURRENT EPISODE, MODERATE (H): ICD-10-CM

## 2019-08-19 RX ORDER — DULOXETIN HYDROCHLORIDE 60 MG/1
60 CAPSULE, DELAYED RELEASE ORAL DAILY
Qty: 35 CAPSULE | Refills: 0 | Status: SHIPPED | OUTPATIENT
Start: 2019-08-19 | End: 2019-09-22

## 2019-08-19 NOTE — TELEPHONE ENCOUNTER
Last seen: 2/5/19  RTC: 3 months   Cancel: 4/9/19, 7/9/19, 7/23/19  No-show: none  Next appt: none scheduled    Incoming refill from Patient via [employer]     Medication requested: Duloxetine 60 mg   Directions: Take 1 capsule by mouth daily   Qty: 35  Last refilled: 7/9/19    Medication refill approved per refill protocol- Message also sent asking for patient to schedule an appointment.

## 2019-09-22 ENCOUNTER — MYC REFILL (OUTPATIENT)
Dept: PSYCHIATRY | Facility: CLINIC | Age: 29
End: 2019-09-22

## 2019-09-22 DIAGNOSIS — F33.1 MAJOR DEPRESSIVE DISORDER, RECURRENT EPISODE, MODERATE (H): ICD-10-CM

## 2019-09-25 RX ORDER — DULOXETIN HYDROCHLORIDE 60 MG/1
60 CAPSULE, DELAYED RELEASE ORAL DAILY
Qty: 35 CAPSULE | Refills: 0 | Status: SHIPPED | OUTPATIENT
Start: 2019-09-25 | End: 2019-10-29

## 2019-09-29 ENCOUNTER — MYC REFILL (OUTPATIENT)
Dept: PSYCHIATRY | Facility: CLINIC | Age: 29
End: 2019-09-29

## 2019-09-29 DIAGNOSIS — F90.0 ATTENTION-DEFICIT HYPERACTIVITY DISORDER, PREDOMINANTLY INATTENTIVE TYPE: ICD-10-CM

## 2019-09-30 RX ORDER — DEXTROAMPHETAMINE SACCHARATE, AMPHETAMINE ASPARTATE, DEXTROAMPHETAMINE SULFATE AND AMPHETAMINE SULFATE 5; 5; 5; 5 MG/1; MG/1; MG/1; MG/1
TABLET ORAL
Qty: 30 TABLET | Refills: 0 | Status: SHIPPED | OUTPATIENT
Start: 2019-09-30 | End: 2019-11-17

## 2019-09-30 RX ORDER — DEXTROAMPHETAMINE SACCHARATE, AMPHETAMINE ASPARTATE MONOHYDRATE, DEXTROAMPHETAMINE SULFATE AND AMPHETAMINE SULFATE 5; 5; 5; 5 MG/1; MG/1; MG/1; MG/1
20 CAPSULE, EXTENDED RELEASE ORAL DAILY
Qty: 30 CAPSULE | Refills: 0 | Status: SHIPPED | OUTPATIENT
Start: 2019-09-30 | End: 2019-11-17

## 2019-10-29 ENCOUNTER — MYC REFILL (OUTPATIENT)
Dept: PSYCHIATRY | Facility: CLINIC | Age: 29
End: 2019-10-29

## 2019-10-29 DIAGNOSIS — F33.1 MAJOR DEPRESSIVE DISORDER, RECURRENT EPISODE, MODERATE (H): ICD-10-CM

## 2019-10-29 NOTE — TELEPHONE ENCOUNTER
Last seen: 2/5/19  RTC: 2 months   Cancel: 4/9/19, 7/9/19, 7/23/19  No-show: none  Next appt: 11/14/19    Incoming refill from Patient via Mychart    Medication requested: Duloxetine 60 mg   Directions: Take 1 capsule by mouth daily   Qty: 35  Last refilled: 9/25/19    Routed to provider due to no cancellations

## 2019-10-31 RX ORDER — DULOXETIN HYDROCHLORIDE 60 MG/1
60 CAPSULE, DELAYED RELEASE ORAL DAILY
Qty: 35 CAPSULE | Refills: 0 | Status: SHIPPED | OUTPATIENT
Start: 2019-10-31 | End: 2019-12-02

## 2019-11-06 ENCOUNTER — MYC REFILL (OUTPATIENT)
Dept: PSYCHIATRY | Facility: CLINIC | Age: 29
End: 2019-11-06

## 2019-11-06 DIAGNOSIS — F33.1 MAJOR DEPRESSIVE DISORDER, RECURRENT EPISODE, MODERATE (H): ICD-10-CM

## 2019-11-07 RX ORDER — LORAZEPAM 0.5 MG/1
TABLET ORAL
Qty: 30 TABLET | Refills: 2 | Status: SHIPPED | OUTPATIENT
Start: 2019-11-07 | End: 2020-02-10

## 2019-11-17 ENCOUNTER — MYC REFILL (OUTPATIENT)
Dept: PSYCHIATRY | Facility: CLINIC | Age: 29
End: 2019-11-17

## 2019-11-17 DIAGNOSIS — F90.0 ATTENTION-DEFICIT HYPERACTIVITY DISORDER, PREDOMINANTLY INATTENTIVE TYPE: ICD-10-CM

## 2019-11-18 NOTE — TELEPHONE ENCOUNTER
Last seen: 2/5/19  RTC: 1 month  Cancel: 4/9/19, 7/9/19, 7/23/19, 11/14/19  No-show: none  Next appt: 1/2/2020    Incoming refill from Patient via [employer]    Medication requested: Adderall XR 20 mg   Directions: Take 1 capsule by mouth daily   Qty: 30  Last refilled: 9/30/19    Medication requested: Adderall 20 mg   Directions: Take 1 tablet every afternoon   Qty: 30  Last refilled: 9/30/19    Routed to provider, controlled substance/ cancellations

## 2019-11-19 RX ORDER — DEXTROAMPHETAMINE SACCHARATE, AMPHETAMINE ASPARTATE, DEXTROAMPHETAMINE SULFATE AND AMPHETAMINE SULFATE 5; 5; 5; 5 MG/1; MG/1; MG/1; MG/1
TABLET ORAL
Qty: 30 TABLET | Refills: 0 | Status: SHIPPED | OUTPATIENT
Start: 2019-11-19 | End: 2020-01-04

## 2019-11-19 RX ORDER — DEXTROAMPHETAMINE SACCHARATE, AMPHETAMINE ASPARTATE MONOHYDRATE, DEXTROAMPHETAMINE SULFATE AND AMPHETAMINE SULFATE 5; 5; 5; 5 MG/1; MG/1; MG/1; MG/1
20 CAPSULE, EXTENDED RELEASE ORAL DAILY
Qty: 30 CAPSULE | Refills: 0 | Status: SHIPPED | OUTPATIENT
Start: 2019-11-19 | End: 2020-01-04

## 2019-12-02 DIAGNOSIS — F33.1 MAJOR DEPRESSIVE DISORDER, RECURRENT EPISODE, MODERATE (H): ICD-10-CM

## 2019-12-03 RX ORDER — DULOXETIN HYDROCHLORIDE 60 MG/1
CAPSULE, DELAYED RELEASE ORAL
Qty: 35 CAPSULE | Refills: 0 | Status: SHIPPED | OUTPATIENT
Start: 2019-12-03 | End: 2020-01-06

## 2019-12-03 NOTE — TELEPHONE ENCOUNTER
Last seen: 2/5/19  RTC: 2 months  Cancel: 2/11/19, 4/9/19, 7/9/19, 7/23/19, 11/14/19  No-show: none  Next appt: 1/2/2020    Incoming refill from Pharmacy via Interface    Medication requested: Duloxetine 60 mg   Directions: Take 1 capsule by mouth daily   Qty: 35  Last refilled: 10/31/19    Routed to provider due to cancellations

## 2020-01-02 ENCOUNTER — MYC REFILL (OUTPATIENT)
Dept: PSYCHIATRY | Facility: CLINIC | Age: 30
End: 2020-01-02

## 2020-01-02 DIAGNOSIS — F90.0 ATTENTION-DEFICIT HYPERACTIVITY DISORDER, PREDOMINANTLY INATTENTIVE TYPE: ICD-10-CM

## 2020-01-04 ENCOUNTER — MYC REFILL (OUTPATIENT)
Dept: PSYCHIATRY | Facility: CLINIC | Age: 30
End: 2020-01-04

## 2020-01-04 DIAGNOSIS — F90.0 ATTENTION-DEFICIT HYPERACTIVITY DISORDER, PREDOMINANTLY INATTENTIVE TYPE: ICD-10-CM

## 2020-01-06 ENCOUNTER — MYC REFILL (OUTPATIENT)
Dept: PSYCHIATRY | Facility: CLINIC | Age: 30
End: 2020-01-06

## 2020-01-06 DIAGNOSIS — F90.0 ATTENTION-DEFICIT HYPERACTIVITY DISORDER, PREDOMINANTLY INATTENTIVE TYPE: ICD-10-CM

## 2020-01-06 DIAGNOSIS — F33.1 MAJOR DEPRESSIVE DISORDER, RECURRENT EPISODE, MODERATE (H): ICD-10-CM

## 2020-01-06 RX ORDER — DEXTROAMPHETAMINE SACCHARATE, AMPHETAMINE ASPARTATE, DEXTROAMPHETAMINE SULFATE AND AMPHETAMINE SULFATE 5; 5; 5; 5 MG/1; MG/1; MG/1; MG/1
TABLET ORAL
Qty: 30 TABLET | Refills: 0 | Status: CANCELLED | OUTPATIENT
Start: 2020-01-06

## 2020-01-06 RX ORDER — DEXTROAMPHETAMINE SACCHARATE, AMPHETAMINE ASPARTATE MONOHYDRATE, DEXTROAMPHETAMINE SULFATE AND AMPHETAMINE SULFATE 5; 5; 5; 5 MG/1; MG/1; MG/1; MG/1
20 CAPSULE, EXTENDED RELEASE ORAL DAILY
Qty: 30 CAPSULE | Refills: 0 | Status: CANCELLED | OUTPATIENT
Start: 2020-01-06

## 2020-01-07 RX ORDER — DEXTROAMPHETAMINE SACCHARATE, AMPHETAMINE ASPARTATE, DEXTROAMPHETAMINE SULFATE AND AMPHETAMINE SULFATE 5; 5; 5; 5 MG/1; MG/1; MG/1; MG/1
TABLET ORAL
Qty: 30 TABLET | Refills: 0 | Status: SHIPPED | OUTPATIENT
Start: 2020-01-07 | End: 2020-02-16

## 2020-01-07 RX ORDER — DEXTROAMPHETAMINE SACCHARATE, AMPHETAMINE ASPARTATE MONOHYDRATE, DEXTROAMPHETAMINE SULFATE AND AMPHETAMINE SULFATE 5; 5; 5; 5 MG/1; MG/1; MG/1; MG/1
20 CAPSULE, EXTENDED RELEASE ORAL DAILY
Qty: 30 CAPSULE | Refills: 0 | Status: SHIPPED | OUTPATIENT
Start: 2020-01-07 | End: 2022-05-05

## 2020-01-07 RX ORDER — DEXTROAMPHETAMINE SACCHARATE, AMPHETAMINE ASPARTATE, DEXTROAMPHETAMINE SULFATE AND AMPHETAMINE SULFATE 5; 5; 5; 5 MG/1; MG/1; MG/1; MG/1
TABLET ORAL
Qty: 30 TABLET | Refills: 0 | Status: SHIPPED | OUTPATIENT
Start: 2020-01-07 | End: 2021-02-19

## 2020-01-07 RX ORDER — DEXTROAMPHETAMINE SACCHARATE, AMPHETAMINE ASPARTATE MONOHYDRATE, DEXTROAMPHETAMINE SULFATE AND AMPHETAMINE SULFATE 5; 5; 5; 5 MG/1; MG/1; MG/1; MG/1
20 CAPSULE, EXTENDED RELEASE ORAL DAILY
Qty: 30 CAPSULE | Refills: 0 | Status: SHIPPED | OUTPATIENT
Start: 2020-01-07 | End: 2020-02-16

## 2020-01-08 NOTE — TELEPHONE ENCOUNTER
Medication requested: DULoxetine (CYMBALTA) 60 MG capsule  Last refilled: my chart request  Last written Rx 12-3-19 for 35 tabs    -- Continue duloxetine 60 mg daily.  -- Continue to monitor BP while on Cymbalta    Last seen: 2-5-19  RTC: 3 months  Cancel: 5  No-show: 0  Next appt: 1-27-20    Refill decision:   Refill pended and routed to the provider for review/determination due to 5 cancelled appt.  ? BP monitoring

## 2020-01-09 RX ORDER — DULOXETIN HYDROCHLORIDE 60 MG/1
60 CAPSULE, DELAYED RELEASE ORAL DAILY
Qty: 30 CAPSULE | Refills: 0 | Status: SHIPPED | OUTPATIENT
Start: 2020-01-09 | End: 2020-02-07

## 2020-02-07 ENCOUNTER — MYC REFILL (OUTPATIENT)
Dept: PSYCHIATRY | Facility: CLINIC | Age: 30
End: 2020-02-07

## 2020-02-07 DIAGNOSIS — F33.1 MAJOR DEPRESSIVE DISORDER, RECURRENT EPISODE, MODERATE (H): ICD-10-CM

## 2020-02-08 ENCOUNTER — MYC REFILL (OUTPATIENT)
Dept: PSYCHIATRY | Facility: CLINIC | Age: 30
End: 2020-02-08

## 2020-02-08 DIAGNOSIS — F33.1 MAJOR DEPRESSIVE DISORDER, RECURRENT EPISODE, MODERATE (H): ICD-10-CM

## 2020-02-08 DIAGNOSIS — F90.0 ATTENTION-DEFICIT HYPERACTIVITY DISORDER, PREDOMINANTLY INATTENTIVE TYPE: ICD-10-CM

## 2020-02-10 ENCOUNTER — MYC REFILL (OUTPATIENT)
Dept: PSYCHIATRY | Facility: CLINIC | Age: 30
End: 2020-02-10

## 2020-02-10 DIAGNOSIS — F33.1 MAJOR DEPRESSIVE DISORDER, RECURRENT EPISODE, MODERATE (H): ICD-10-CM

## 2020-02-10 RX ORDER — DULOXETIN HYDROCHLORIDE 60 MG/1
60 CAPSULE, DELAYED RELEASE ORAL DAILY
Qty: 30 CAPSULE | Refills: 0 | Status: CANCELLED | OUTPATIENT
Start: 2020-02-10

## 2020-02-10 NOTE — TELEPHONE ENCOUNTER
Medication requested: DULoxetine (CYMBALTA) 60 MG capsule  My chart request  Last Written Rx: 1-9-20 for 30 tab w/0 RF    -- Continue to monitor BP while on Cymbalta  -- Continue duloxetine 60 mg daily.     Last seen: 2-5-19  RTC: one month  Cancel: 5  No-show: 1  Next appt: none    Refill decision:   Refill pended and routed to the provider for review/determination due to 5 cancelled appt, 1 no show.  RTC out of time frame    Scheduling has been notified to contact the pt for appointment.

## 2020-02-11 ENCOUNTER — MYC REFILL (OUTPATIENT)
Dept: PSYCHIATRY | Facility: CLINIC | Age: 30
End: 2020-02-11

## 2020-02-11 DIAGNOSIS — F33.1 MAJOR DEPRESSIVE DISORDER, RECURRENT EPISODE, MODERATE (H): ICD-10-CM

## 2020-02-11 RX ORDER — DULOXETIN HYDROCHLORIDE 60 MG/1
60 CAPSULE, DELAYED RELEASE ORAL DAILY
Qty: 30 CAPSULE | Refills: 0 | Status: CANCELLED | OUTPATIENT
Start: 2020-02-11

## 2020-02-11 RX ORDER — DULOXETIN HYDROCHLORIDE 60 MG/1
60 CAPSULE, DELAYED RELEASE ORAL DAILY
Qty: 30 CAPSULE | Refills: 0 | Status: SHIPPED | OUTPATIENT
Start: 2020-02-11 | End: 2021-02-19

## 2020-02-11 RX ORDER — LORAZEPAM 0.5 MG/1
TABLET ORAL
Qty: 30 TABLET | Refills: 2 | Status: SHIPPED | OUTPATIENT
Start: 2020-02-11 | End: 2020-04-16

## 2020-02-11 NOTE — TELEPHONE ENCOUNTER
Yessica Wilkins MD Swanson, Melanie A RN   Caller: Unspecified (4 days ago,  3:50 PM)   Phone Number: [phone]             Can you tell her that the reason it is taking so long to get refills is that she has not seen me in over a year. This means that a new prescription has to be entered every month which can take up to a week to get approved. Because she has not been in to the clinic recently, it is not as simple as having another provider refill the rx. Unfortunately, my schedule has gotten much busier and it is not possible to get in to see me as quickly as it used to be. Although I enjoy being her provider, if may be that a provider with more availability would be a better fit for her. If she would like to do this we can provide her with a list of referrals and I would continue to cover her until she was able to establish care. Likewise, we could also have her transfer back to the resident clinic where she was before I took over her care many years ago.     Marcia

## 2020-02-11 NOTE — TELEPHONE ENCOUNTER
Last seen: 2/5/19  RTC: 2 months  Cancel: 4/9/19, 7/9/19, 7/23/19, 11/14/19, 12/2/19  No-show: 1/23/20  Next appt: 4/16/20    Incoming refill from Patient via [facility]    Medication requested: Lorazpeam 0.5 mg   Directions: Take 1 tablet by mouth daily as needed for anxiety  Qty: 30  Last refilled: 11/7/19 with two refill     Routed to provider, due to many cancellations and one no show, controlled substance

## 2020-02-12 RX ORDER — DULOXETIN HYDROCHLORIDE 60 MG/1
CAPSULE, DELAYED RELEASE ORAL
Qty: 30 CAPSULE | Refills: 0 | OUTPATIENT
Start: 2020-02-12

## 2020-02-12 NOTE — TELEPHONE ENCOUNTER
-Writer called patient to discuss response from Dr. Wilkins below.  Received voicemail.  Left message asking for  A return call.  Clinic number provided.

## 2020-02-16 ENCOUNTER — MYC REFILL (OUTPATIENT)
Dept: PSYCHIATRY | Facility: CLINIC | Age: 30
End: 2020-02-16

## 2020-02-16 DIAGNOSIS — F90.0 ATTENTION-DEFICIT HYPERACTIVITY DISORDER, PREDOMINANTLY INATTENTIVE TYPE: ICD-10-CM

## 2020-02-17 RX ORDER — DEXTROAMPHETAMINE SACCHARATE, AMPHETAMINE ASPARTATE, DEXTROAMPHETAMINE SULFATE AND AMPHETAMINE SULFATE 5; 5; 5; 5 MG/1; MG/1; MG/1; MG/1
TABLET ORAL
Qty: 30 TABLET | Refills: 0 | Status: SHIPPED | OUTPATIENT
Start: 2020-02-17 | End: 2021-02-19

## 2020-02-17 RX ORDER — DEXTROAMPHETAMINE SACCHARATE, AMPHETAMINE ASPARTATE MONOHYDRATE, DEXTROAMPHETAMINE SULFATE AND AMPHETAMINE SULFATE 5; 5; 5; 5 MG/1; MG/1; MG/1; MG/1
20 CAPSULE, EXTENDED RELEASE ORAL DAILY
Qty: 30 CAPSULE | Refills: 0 | Status: SHIPPED | OUTPATIENT
Start: 2020-02-17 | End: 2021-02-19

## 2020-02-20 RX ORDER — DEXTROAMPHETAMINE SACCHARATE, AMPHETAMINE ASPARTATE MONOHYDRATE, DEXTROAMPHETAMINE SULFATE AND AMPHETAMINE SULFATE 5; 5; 5; 5 MG/1; MG/1; MG/1; MG/1
20 CAPSULE, EXTENDED RELEASE ORAL DAILY
Qty: 30 CAPSULE | Refills: 0 | Status: SHIPPED | OUTPATIENT
Start: 2020-02-20 | End: 2020-07-28

## 2020-02-20 RX ORDER — DEXTROAMPHETAMINE SACCHARATE, AMPHETAMINE ASPARTATE, DEXTROAMPHETAMINE SULFATE AND AMPHETAMINE SULFATE 5; 5; 5; 5 MG/1; MG/1; MG/1; MG/1
TABLET ORAL
Qty: 30 TABLET | Refills: 0 | Status: SHIPPED | OUTPATIENT
Start: 2020-02-20 | End: 2021-02-19

## 2020-02-20 RX ORDER — DULOXETIN HYDROCHLORIDE 60 MG/1
60 CAPSULE, DELAYED RELEASE ORAL DAILY
Qty: 30 CAPSULE | Refills: 0 | Status: SHIPPED | OUTPATIENT
Start: 2020-02-20 | End: 2020-03-10

## 2020-03-02 ENCOUNTER — HEALTH MAINTENANCE LETTER (OUTPATIENT)
Age: 30
End: 2020-03-02

## 2020-03-10 ENCOUNTER — MYC REFILL (OUTPATIENT)
Dept: PSYCHIATRY | Facility: CLINIC | Age: 30
End: 2020-03-10

## 2020-03-10 DIAGNOSIS — F33.1 MAJOR DEPRESSIVE DISORDER, RECURRENT EPISODE, MODERATE (H): ICD-10-CM

## 2020-03-11 NOTE — TELEPHONE ENCOUNTER
Medication requested:   DULoxetine (CYMBALTA) 60 MG capsule        Last refilled: 2-20-20  Qty: 30      Last seen: 2-5-19  RTC: 3 month  Cancel: 5  No-show: 1  Next appt: 4-16-20    Refill decision:   Refill pended and routed to the provider for review/determination due to 5 cancels, 1 no show.  Will send FYI to provider as is outside RTC timeframe.  Last refill does not cover til next RTC  Last clinic visit > 1 year.

## 2020-03-12 RX ORDER — DULOXETIN HYDROCHLORIDE 60 MG/1
60 CAPSULE, DELAYED RELEASE ORAL DAILY
Qty: 30 CAPSULE | Refills: 0 | Status: SHIPPED | OUTPATIENT
Start: 2020-03-12 | End: 2020-04-07

## 2020-04-07 ENCOUNTER — MYC REFILL (OUTPATIENT)
Dept: PSYCHIATRY | Facility: CLINIC | Age: 30
End: 2020-04-07

## 2020-04-07 DIAGNOSIS — F33.1 MAJOR DEPRESSIVE DISORDER, RECURRENT EPISODE, MODERATE (H): ICD-10-CM

## 2020-04-08 NOTE — TELEPHONE ENCOUNTER
Last seen: 2/5/19  RTC: 2 months  Cancel: 4/9/19, 7/8/19, 7/23/19, 11/14/19, 1/2/20  No-show: 1/23/20  Next appt: 4/12/20    Incoming refill from Patient  via Mychart    Medication requested: Duloxetine 60 mg   Directions: Take 1 capsule by mouth daily   Qty: 30  Last refilled: 3/12/20    Routed to provider due to no show and cancellations

## 2020-04-09 RX ORDER — DULOXETIN HYDROCHLORIDE 60 MG/1
60 CAPSULE, DELAYED RELEASE ORAL DAILY
Qty: 30 CAPSULE | Refills: 0 | Status: SHIPPED | OUTPATIENT
Start: 2020-04-09 | End: 2020-04-16

## 2020-04-16 ENCOUNTER — OFFICE VISIT (OUTPATIENT)
Dept: PSYCHIATRY | Facility: CLINIC | Age: 30
End: 2020-04-16
Payer: COMMERCIAL

## 2020-04-16 VITALS
BODY MASS INDEX: 19.92 KG/M2 | WEIGHT: 123.4 LBS | DIASTOLIC BLOOD PRESSURE: 79 MMHG | SYSTOLIC BLOOD PRESSURE: 114 MMHG | HEART RATE: 103 BPM

## 2020-04-16 DIAGNOSIS — F90.0 ADHD (ATTENTION DEFICIT HYPERACTIVITY DISORDER), INATTENTIVE TYPE: ICD-10-CM

## 2020-04-16 DIAGNOSIS — F33.1 MAJOR DEPRESSIVE DISORDER, RECURRENT EPISODE, MODERATE (H): Primary | ICD-10-CM

## 2020-04-16 RX ORDER — LORAZEPAM 0.5 MG/1
TABLET ORAL
Qty: 30 TABLET | Refills: 2 | Status: SHIPPED | OUTPATIENT
Start: 2020-04-16 | End: 2020-08-11

## 2020-04-16 RX ORDER — DEXTROAMPHETAMINE SACCHARATE, AMPHETAMINE ASPARTATE MONOHYDRATE, DEXTROAMPHETAMINE SULFATE AND AMPHETAMINE SULFATE 5; 5; 5; 5 MG/1; MG/1; MG/1; MG/1
20 CAPSULE, EXTENDED RELEASE ORAL DAILY
Qty: 30 CAPSULE | Refills: 0 | Status: SHIPPED | OUTPATIENT
Start: 2020-04-16 | End: 2020-05-16

## 2020-04-16 RX ORDER — DULOXETIN HYDROCHLORIDE 60 MG/1
60 CAPSULE, DELAYED RELEASE ORAL DAILY
Qty: 90 CAPSULE | Refills: 1 | Status: SHIPPED | OUTPATIENT
Start: 2020-04-16 | End: 2020-11-09

## 2020-04-16 RX ORDER — DEXTROAMPHETAMINE SACCHARATE, AMPHETAMINE ASPARTATE MONOHYDRATE, DEXTROAMPHETAMINE SULFATE AND AMPHETAMINE SULFATE 5; 5; 5; 5 MG/1; MG/1; MG/1; MG/1
20 CAPSULE, EXTENDED RELEASE ORAL DAILY
Qty: 30 CAPSULE | Refills: 0 | Status: SHIPPED | OUTPATIENT
Start: 2020-06-17 | End: 2020-07-17

## 2020-04-16 RX ORDER — DEXTROAMPHETAMINE SACCHARATE, AMPHETAMINE ASPARTATE MONOHYDRATE, DEXTROAMPHETAMINE SULFATE AND AMPHETAMINE SULFATE 5; 5; 5; 5 MG/1; MG/1; MG/1; MG/1
20 CAPSULE, EXTENDED RELEASE ORAL DAILY
Qty: 30 CAPSULE | Refills: 0 | Status: SHIPPED | OUTPATIENT
Start: 2020-05-17 | End: 2020-06-16

## 2020-04-16 ASSESSMENT — PAIN SCALES - GENERAL: PAINLEVEL: NO PAIN (0)

## 2020-04-16 ASSESSMENT — PATIENT HEALTH QUESTIONNAIRE - PHQ9: SUM OF ALL RESPONSES TO PHQ QUESTIONS 1-9: 6

## 2020-04-16 NOTE — PROGRESS NOTES
"PSYCHIATRY CLINIC PROGRESS NOTE  Medication management & Psychotherapy    IDENTIFICATION:  Saskia Ortega is a 29 year old female with previous psychiatric diagnoses of major depressive disorder, recurrent, generalized anxiety disorder, and ADHD.  Patient presents for ongoing psychiatric follow-up and was seen for initial evaluation on 2012.    SUBJECTIVE:  The patient was last seen in clinic on 2019 at which time no medication changes were made.  Since the time of the last visit:     Pt reports that a lot has happened since she was last seen.    Was engaged to boyfriend last April. He did this in [place] and flew    Dog  in November. She and mother had had her since she was 13 years old.  Reports that this has been one of the largest sources of anxiety for her. They had time to say goodbye to her but this had been a frequent source of sadness. She also notes that she lost a lot of weight during this time.    She and Miles sold their house in September and moved out in January.    Feels that she had been coping with COVID-19 well. However, last Wednesday she realized that she didn't want to get out of bed. Decided last weekend to move back in with her mother this coming weekend. Also noticed that she and Miles had been \"getting     She is currently not using a form of birth control. Discussed how this may not be the best time to conceive given potential medical complications associated with COVID-19 infection.    She also states that she has significant increase in irritability before she is about to start menstruating. Does not feel that this has gotten worse since have Norplant removed.     Reports that she decided to recently decrease Cymbalta. States that when she is not in a stressful environment she is able to tolerate not taking this     States that current medication regimen is well managing mood and anxiety. Also, feels ADHD symptoms are well-managed with current medication regimen. Sleep continues " "to be improved with re-addition of lorazepam to medication list.    Denies other side effects to medication.    Symptoms:  Denies inattention in the afternoon. Denies sleep disruption. Denies infrequent low mood, anhedonia, fatigue, self-derogatory thoughts, appetite disturbance, psychomotor slowing or concentration problems.  Denies suicidal ideation.  Ongoing periodic anxiety.   Medication side-effects:  Previously experienced fatigue, weight gain, and loss of libido associated with sertraline as well as fatigue and amotivation with higher dose citalopram.  Medical ROS:     Cardiovascular: negative for palpitations, tachycardia  Gastrointestinal: negative for increased appetite, nausea, vomiting, constipation and diarrhea  Neurologic: negative for headaches and cognitive problems  Psychiatric: negative for sleep disturbance, increased stress, feeling anxious, thoughts of self-harm, agitation and sexual difficulties.    MEDICAL TEAM:         - Primary Medical Provider:  unknown  - Therapist: none currently (previously followed by this provider for supportive psychotherapy)    ALLERGIES: NKDA    MEDICATIONS:  Adderall XR 20 mg daily  Adderall IR 10 mg qAfternoon  Duloxetine 60 mg daily   Lorazepam 0.5 mg daily PRN anxiety/sleep    LABS: no new results  VITALS: There were no vitals taken for this visit.    OBJECTIVE:   Alert and oriented.  Well groomed, calm, cooperative with good eye contact.  No problems with speech or psychomotor behavior.  Mood was described as \"good.\" Affect was euthymic with improved brightness, congruent to speech content. Thought process was unremarkable and thought content was congruent to speech content, and devoid of suicidal and homicidal ideation and psychotic thought.  No hallucinations.  Insight was good.  Judgment was intact and adequate for safety.  Patient demonstrates no obvious problems with attention, concentration, language, short or long term memory by observation of " conversation.  These were not formally tested.  Fund of knowledge was intact.    ASSESSMENT:    Historical:  Saskia Ortega is a 29 year old  female with history of depression and anxiety who presents for follow-up medication management. She was transitioned from Effexor to Celexa in spring/summer 2013 with good results.  She was previously seen for monthly combination medication management and psychotherapy by her last provider.  She describes obtaining benefit from this and requested to increase frequency of sessions to every other week to work on processing family issues which have become more apparent after beginning a new relationship.  She continued to find benefit from Adderall for ADD and lorazepam as PRN for anxiety. She took them as prescribed and had no issues with substance abuse.  Pt has a history trials of Effexor and citalopram which were both effective for managing depression but discontinued secondary to side effects.  In addition, trial of Vyvanse was attempted during fall 2014, however, pt did not find it as effective as Adderall for management of ADD sx and so was transitioned back to Adderall.  At January 2015 visit, pt described significant worsening of sx of depression and cross-titration from citalopram to sertraline was initiated.  She tolerated transition well and initially felt mood was well managed at 150 mg daily.    Current:  Today, pt reports mood, anxiety, attention, and sleep are well managed with current medication regimen. Denies ongoing difficulties in relationship with boyfriend, however, appears to be navigating this well with appropriate emphasis on maintaining her mental health.    For the future, may consider increasing dose of duloxetine to 90 mg vs trial of escitalopram and/or bupropion should depression symptoms or anxiety worsen.    The risks, benefits, alternatives and potential adverse effects have been explained and are understood by the patient.  The patient  agrees to the plan with the capacity to do so.  The patient knows to call the clinic for any problems or access emergency care if needed. The patient is not pregnant.  She is not abusing substances and shows no evidence for abuse of medication.  Controlled substances are still appropriate and required.  No medical contraindications to treatment.    DMS-5 DIAGNOSES:  Major depressive disorder, recurrent, moderate, in full remission (F33.42)  Generalized anxiety disorder (F41.1)  Attention deficit disorder, predominantly inattentive presentation, mild (F90.0)  Anorexia Nervosa, mild, in partial remission (F50.01)     PLAN:  Medications:   -- Stop Adderall to IR 20 mg qAfternoon as patient is not needing this dose because she is not working.  -- Continue duloxetine 60 mg daily.  (Refills x 3 months provided today, 04/16/20).  -- Continue lorazepam 0.5 mg daily PRN anxiety/insomnia.  (Refills x 3 months provided today, 04/16/20).  -- Continue Adderall XR 20 mg daily. (Refills x 3 months provided today, 04/16/20).  Psychotherapy:   Will continue with combined psychotherapy and medication management every 3 months.  RTC:  3 months for 30 min. U  Labs/Monitoring:    -- Recommend pt's weight NOT be checked prior to appointments.  -- Continue to monitor BP while on [facility]        PSYCHIATRY CLINIC INDIVIDUAL PSYCHOTHERAPY NOTE                                                   [16]   Start time: 10:53am  End time: 11: 15am    Date reviewed: 04/16/2020       Date next due: 07/16/2020  Subjective: This supportive psychotherapy session addressed issues related to patient's history, current stressors, life stressors, relationships and work .  Patient's reaction: Preparatory in the context of mental status appropriate for ambulatory setting.  Progress: good  Plan: RTC 1 month  Psychotherapy services during this visit included  myself and Saskia Ortega.   TREATMENT  PLAN          SYMPTOMS; PROBLEMS   MEASURABLE GOALS;     FUNCTIONAL IMPROVEMENT INTERVENTIONS;   GAINS MADE DISCHARGE CRITERIA   Depression: depressed mood   report feeling more positive about self  strength focus marked symptom improvement   ADHD: inattention; Paying attention to details and Listening    develop strategies for thought distraction when ruminating strength focus marked symptom improvement         PROVIDER:  MD MITCHEL Mcmanus MD  [facility]  Department of Psychiatry

## 2020-07-28 ENCOUNTER — MYC REFILL (OUTPATIENT)
Dept: PSYCHIATRY | Facility: CLINIC | Age: 30
End: 2020-07-28

## 2020-07-28 DIAGNOSIS — F90.0 ATTENTION-DEFICIT HYPERACTIVITY DISORDER, PREDOMINANTLY INATTENTIVE TYPE: ICD-10-CM

## 2020-07-28 RX ORDER — DEXTROAMPHETAMINE SACCHARATE, AMPHETAMINE ASPARTATE MONOHYDRATE, DEXTROAMPHETAMINE SULFATE AND AMPHETAMINE SULFATE 5; 5; 5; 5 MG/1; MG/1; MG/1; MG/1
20 CAPSULE, EXTENDED RELEASE ORAL DAILY
Qty: 30 CAPSULE | Refills: 0 | Status: SHIPPED | OUTPATIENT
Start: 2020-07-28 | End: 2020-08-30

## 2020-07-28 NOTE — TELEPHONE ENCOUNTER
Last seen: 4/16/20  RTC: 3 months   Cancel: 6/25/20  No-show: none  Next appt: none    Incoming refill from Patient via [facility]    Medication requested: Adderall XR 20 mg   Directions: Take 1 capsule by mouth daily   Qty: 30  Last refilled: 6/17/20    Routed to provider; controlled substance; message also sent to patient to schedule an appointment.

## 2020-08-11 ENCOUNTER — MYC REFILL (OUTPATIENT)
Dept: PSYCHIATRY | Facility: CLINIC | Age: 30
End: 2020-08-11

## 2020-08-11 DIAGNOSIS — F33.1 MAJOR DEPRESSIVE DISORDER, RECURRENT EPISODE, MODERATE (H): ICD-10-CM

## 2020-08-11 RX ORDER — LORAZEPAM 0.5 MG/1
TABLET ORAL
Qty: 30 TABLET | Refills: 0 | Status: SHIPPED | OUTPATIENT
Start: 2020-08-11 | End: 2020-09-23

## 2020-08-11 NOTE — TELEPHONE ENCOUNTER
Last seen: 4/16/20  RTC: 3 months   Cancel: 6/25/20  No-show: none  Next appt: 9/3/20    Incoming refill from Patient via [facility]    Medication requested: Lorazepam 0.5 mg    Directions: Take 1 tablet by mouth daily as needed for anxiety   Qty: 30  Last refilled: 4/16/20 with two refills     Routed to provider; controlled substance

## 2020-08-12 RX ORDER — DULOXETIN HYDROCHLORIDE 60 MG/1
60 CAPSULE, DELAYED RELEASE ORAL DAILY
Qty: 90 CAPSULE | Refills: 1 | OUTPATIENT
Start: 2020-08-12

## 2020-08-12 NOTE — TELEPHONE ENCOUNTER
Last seen: 4/16/20  RTC: 3 months  Cancel: 6/25/20  No-show: none  Next appt: 9/3/20    Incoming refill from patient via mychart    Medication requested: Duloxetine 60 mg   Directions: Take 1 capsule by mouth daily   Qty: 90  Last refilled: 4/16/20 with one refill     Denied, should still have refill on file

## 2020-08-30 ENCOUNTER — MYC REFILL (OUTPATIENT)
Dept: PSYCHIATRY | Facility: CLINIC | Age: 30
End: 2020-08-30

## 2020-08-30 DIAGNOSIS — F90.0 ATTENTION-DEFICIT HYPERACTIVITY DISORDER, PREDOMINANTLY INATTENTIVE TYPE: ICD-10-CM

## 2020-08-31 ENCOUNTER — MYC REFILL (OUTPATIENT)
Dept: PSYCHIATRY | Facility: CLINIC | Age: 30
End: 2020-08-31

## 2020-08-31 DIAGNOSIS — F90.0 ATTENTION-DEFICIT HYPERACTIVITY DISORDER, PREDOMINANTLY INATTENTIVE TYPE: ICD-10-CM

## 2020-08-31 RX ORDER — DEXTROAMPHETAMINE SACCHARATE, AMPHETAMINE ASPARTATE MONOHYDRATE, DEXTROAMPHETAMINE SULFATE AND AMPHETAMINE SULFATE 5; 5; 5; 5 MG/1; MG/1; MG/1; MG/1
20 CAPSULE, EXTENDED RELEASE ORAL DAILY
Qty: 30 CAPSULE | Refills: 0 | Status: CANCELLED | OUTPATIENT
Start: 2020-08-31

## 2020-08-31 NOTE — TELEPHONE ENCOUNTER
Last seen: 4/16/20  RTC: 3 months  Cancel: 6/25/20  No-show: none  Next appt: 9/3/20    Incoming refill from Patient via Mychart     Medication requested: Adderall XR 20 mg   Directions: Take 1 capsule by mouth daily   Qty: 30  Last refilled: 7/28/20    Routed to provider; controlled substance

## 2020-09-01 ENCOUNTER — MYC REFILL (OUTPATIENT)
Dept: PSYCHIATRY | Facility: CLINIC | Age: 30
End: 2020-09-01

## 2020-09-01 DIAGNOSIS — F90.0 ATTENTION-DEFICIT HYPERACTIVITY DISORDER, PREDOMINANTLY INATTENTIVE TYPE: ICD-10-CM

## 2020-09-01 RX ORDER — DEXTROAMPHETAMINE SACCHARATE, AMPHETAMINE ASPARTATE MONOHYDRATE, DEXTROAMPHETAMINE SULFATE AND AMPHETAMINE SULFATE 5; 5; 5; 5 MG/1; MG/1; MG/1; MG/1
20 CAPSULE, EXTENDED RELEASE ORAL DAILY
Qty: 30 CAPSULE | Refills: 0 | OUTPATIENT
Start: 2020-09-01

## 2020-09-01 RX ORDER — DEXTROAMPHETAMINE SACCHARATE, AMPHETAMINE ASPARTATE MONOHYDRATE, DEXTROAMPHETAMINE SULFATE AND AMPHETAMINE SULFATE 5; 5; 5; 5 MG/1; MG/1; MG/1; MG/1
20 CAPSULE, EXTENDED RELEASE ORAL DAILY
Qty: 30 CAPSULE | Refills: 0 | Status: SHIPPED | OUTPATIENT
Start: 2020-09-01 | End: 2020-10-06

## 2020-09-23 ENCOUNTER — MYC REFILL (OUTPATIENT)
Dept: PSYCHIATRY | Facility: CLINIC | Age: 30
End: 2020-09-23

## 2020-09-23 DIAGNOSIS — F33.1 MAJOR DEPRESSIVE DISORDER, RECURRENT EPISODE, MODERATE (H): ICD-10-CM

## 2020-09-24 RX ORDER — LORAZEPAM 0.5 MG/1
TABLET ORAL
Qty: 30 TABLET | Refills: 0 | Status: SHIPPED | OUTPATIENT
Start: 2020-09-24 | End: 2020-11-29

## 2020-09-24 NOTE — TELEPHONE ENCOUNTER
Last seen: 4/16/20  RTC: 3 months  Cancel: 6/25/20  No-show: 9/3/20  Next appt: none scheduled    Incoming refill from patient via [facility]    Medication requested: Lorazepam 0.5 mg   Directions: Take 1 tablet by mouth daily as needed for anxiety   Qty: 30  Last refilled: 8/11/20    Routed to provider; controlled substance and last appointment was a no show

## 2020-10-03 ENCOUNTER — MYC REFILL (OUTPATIENT)
Dept: PSYCHIATRY | Facility: CLINIC | Age: 30
End: 2020-10-03

## 2020-10-03 DIAGNOSIS — F90.0 ATTENTION-DEFICIT HYPERACTIVITY DISORDER, PREDOMINANTLY INATTENTIVE TYPE: ICD-10-CM

## 2020-10-06 ENCOUNTER — MYC REFILL (OUTPATIENT)
Dept: PSYCHIATRY | Facility: CLINIC | Age: 30
End: 2020-10-06

## 2020-10-06 DIAGNOSIS — F90.0 ATTENTION-DEFICIT HYPERACTIVITY DISORDER, PREDOMINANTLY INATTENTIVE TYPE: ICD-10-CM

## 2020-10-06 RX ORDER — DEXTROAMPHETAMINE SACCHARATE, AMPHETAMINE ASPARTATE MONOHYDRATE, DEXTROAMPHETAMINE SULFATE AND AMPHETAMINE SULFATE 5; 5; 5; 5 MG/1; MG/1; MG/1; MG/1
20 CAPSULE, EXTENDED RELEASE ORAL DAILY
Qty: 30 CAPSULE | Refills: 0 | Status: SHIPPED | OUTPATIENT
Start: 2020-10-06 | End: 2021-02-19

## 2020-10-06 NOTE — TELEPHONE ENCOUNTER
Last seen: 4/16/20  RTC: 3 months   Cancel: 6/25/20  No-show: 7/3/20  Next appt: 10/29/20    Incoming refill from Patient via mychart    Medication requested: Adderall XR 20 mg   Directions: Take 1 capsule by mouth eleuterio   Qty: 30  Last refilled: 9/1/20    Routed to provider; controlled substance

## 2020-10-13 RX ORDER — DEXTROAMPHETAMINE SACCHARATE, AMPHETAMINE ASPARTATE MONOHYDRATE, DEXTROAMPHETAMINE SULFATE AND AMPHETAMINE SULFATE 5; 5; 5; 5 MG/1; MG/1; MG/1; MG/1
20 CAPSULE, EXTENDED RELEASE ORAL DAILY
Qty: 30 CAPSULE | Refills: 0 | Status: SHIPPED | OUTPATIENT
Start: 2020-10-13 | End: 2020-11-02

## 2020-11-02 ENCOUNTER — MYC REFILL (OUTPATIENT)
Dept: PSYCHIATRY | Facility: CLINIC | Age: 30
End: 2020-11-02

## 2020-11-02 DIAGNOSIS — F90.0 ATTENTION-DEFICIT HYPERACTIVITY DISORDER, PREDOMINANTLY INATTENTIVE TYPE: ICD-10-CM

## 2020-11-02 NOTE — TELEPHONE ENCOUNTER
Last seen: 04/16/20  RTC: none  Cancel: 10/29/20  No-show: 9/3/20  Next appt: none     Incoming refill from patient via mychart    Medication requested: adderall XR 20 mg  Directions: take 1 capsule daily   Qty: 30  Last refilled: 10/13/20    Medication refill approved per refill protocol    Routed to provider

## 2020-11-05 RX ORDER — DEXTROAMPHETAMINE SACCHARATE, AMPHETAMINE ASPARTATE MONOHYDRATE, DEXTROAMPHETAMINE SULFATE AND AMPHETAMINE SULFATE 5; 5; 5; 5 MG/1; MG/1; MG/1; MG/1
20 CAPSULE, EXTENDED RELEASE ORAL DAILY
Qty: 30 CAPSULE | Refills: 0 | Status: SHIPPED | OUTPATIENT
Start: 2020-11-05 | End: 2020-12-11

## 2020-11-07 DIAGNOSIS — F33.1 MAJOR DEPRESSIVE DISORDER, RECURRENT EPISODE, MODERATE (H): ICD-10-CM

## 2020-11-09 ENCOUNTER — MYC REFILL (OUTPATIENT)
Dept: PSYCHIATRY | Facility: CLINIC | Age: 30
End: 2020-11-09

## 2020-11-09 DIAGNOSIS — F33.1 MAJOR DEPRESSIVE DISORDER, RECURRENT EPISODE, MODERATE (H): ICD-10-CM

## 2020-11-10 RX ORDER — DULOXETIN HYDROCHLORIDE 60 MG/1
60 CAPSULE, DELAYED RELEASE ORAL DAILY
Qty: 90 CAPSULE | Refills: 1 | Status: SHIPPED | OUTPATIENT
Start: 2020-11-10 | End: 2021-02-25

## 2020-11-10 NOTE — TELEPHONE ENCOUNTER
Last seen: 04/16/20  RTC: none  Cancel: 10/29/20  No-show: 09/03/20  Next appt: none (messaged pt to make one)    Incoming refill from patient via [facility]    Medication requested: Cymbalta 60 mg  Directions: take 1 capsule PO daily   Qty: 90 capsules  Last refilled: 08/11/20    Medication refill approved per refill protocol

## 2020-11-11 RX ORDER — DULOXETIN HYDROCHLORIDE 60 MG/1
60 CAPSULE, DELAYED RELEASE ORAL DAILY
Qty: 90 CAPSULE | Refills: 1 | OUTPATIENT
Start: 2020-11-11

## 2020-11-29 ENCOUNTER — MYC REFILL (OUTPATIENT)
Dept: PSYCHIATRY | Facility: CLINIC | Age: 30
End: 2020-11-29

## 2020-11-29 DIAGNOSIS — F33.1 MAJOR DEPRESSIVE DISORDER, RECURRENT EPISODE, MODERATE (H): ICD-10-CM

## 2020-11-30 NOTE — TELEPHONE ENCOUNTER
Last seen: 4/16/20  RTC: 3 months  Cancel: 10/29/20  No-show: 9/3/20  Next appt: none    Incoming refill from patient via mychart    Medication requested: Lorazepam 0.5 mg  Directions: take 1 tablet PO PRN anxiety  Qty: 30  Last refilled: 9/24/20    Controlled substance, refill routed to provider   *patient hasn't had MFU Visit since April, multiple no shows/cancel.

## 2020-12-09 RX ORDER — LORAZEPAM 0.5 MG/1
TABLET ORAL
Qty: 30 TABLET | Refills: 0 | Status: SHIPPED | OUTPATIENT
Start: 2020-12-09 | End: 2021-02-25

## 2020-12-11 ENCOUNTER — MYC REFILL (OUTPATIENT)
Dept: PSYCHIATRY | Facility: CLINIC | Age: 30
End: 2020-12-11

## 2020-12-11 DIAGNOSIS — F90.0 ATTENTION-DEFICIT HYPERACTIVITY DISORDER, PREDOMINANTLY INATTENTIVE TYPE: ICD-10-CM

## 2020-12-11 NOTE — TELEPHONE ENCOUNTER
Last seen: 4/16/20  RTC: 1 month  Cancel: > 5 times  No-show: 2  Next appt: NONE    Incoming refill from patient via mychart    Medication requested: Adderall XR 20 MG  Directions: take 1 capsule PO daily  Qty: 30  Last refilled: 11/05/20    Controlled substance, routing to provider  *RTC not scheduled

## 2020-12-14 ENCOUNTER — HEALTH MAINTENANCE LETTER (OUTPATIENT)
Age: 30
End: 2020-12-14

## 2020-12-15 RX ORDER — DEXTROAMPHETAMINE SACCHARATE, AMPHETAMINE ASPARTATE MONOHYDRATE, DEXTROAMPHETAMINE SULFATE AND AMPHETAMINE SULFATE 5; 5; 5; 5 MG/1; MG/1; MG/1; MG/1
20 CAPSULE, EXTENDED RELEASE ORAL DAILY
Qty: 30 CAPSULE | Refills: 0 | Status: SHIPPED | OUTPATIENT
Start: 2020-12-15 | End: 2021-01-15

## 2021-01-15 ENCOUNTER — MYC REFILL (OUTPATIENT)
Dept: PSYCHIATRY | Facility: CLINIC | Age: 31
End: 2021-01-15

## 2021-01-15 DIAGNOSIS — F90.0 ATTENTION-DEFICIT HYPERACTIVITY DISORDER, PREDOMINANTLY INATTENTIVE TYPE: ICD-10-CM

## 2021-01-15 NOTE — TELEPHONE ENCOUNTER
Medication Refill request received for   Pending Prescriptions:                       Disp   Refills    amphetamine-dextroamphetamine (ADDERALL X*30 cap*0            Sig: Take 1 capsule (20 mg) by mouth daily      Last Written Prescription Date:  12/15/20  Last Fill Quantity: 30,   # refills: 0   Length of last prescription in time: 1 month  Last Office Visit : 4/16/20  Future Office visit:  none    [place] Medication Refill Protocol requirements:      Refill request was forwarded to the provider because Medication is a controlled substance

## 2021-01-18 ENCOUNTER — MYC REFILL (OUTPATIENT)
Dept: PSYCHIATRY | Facility: CLINIC | Age: 31
End: 2021-01-18

## 2021-01-18 DIAGNOSIS — F90.0 ATTENTION-DEFICIT HYPERACTIVITY DISORDER, PREDOMINANTLY INATTENTIVE TYPE: ICD-10-CM

## 2021-01-18 RX ORDER — DEXTROAMPHETAMINE SACCHARATE, AMPHETAMINE ASPARTATE MONOHYDRATE, DEXTROAMPHETAMINE SULFATE AND AMPHETAMINE SULFATE 5; 5; 5; 5 MG/1; MG/1; MG/1; MG/1
20 CAPSULE, EXTENDED RELEASE ORAL DAILY
Qty: 30 CAPSULE | Refills: 0 | Status: CANCELLED | OUTPATIENT
Start: 2021-01-18

## 2021-01-19 RX ORDER — DEXTROAMPHETAMINE SACCHARATE, AMPHETAMINE ASPARTATE MONOHYDRATE, DEXTROAMPHETAMINE SULFATE AND AMPHETAMINE SULFATE 5; 5; 5; 5 MG/1; MG/1; MG/1; MG/1
20 CAPSULE, EXTENDED RELEASE ORAL DAILY
Qty: 30 CAPSULE | Refills: 0 | Status: SHIPPED | OUTPATIENT
Start: 2021-01-19 | End: 2021-02-19

## 2021-02-01 NOTE — PROGRESS NOTES
Saskia is a 30 year old  female who presents for annual exam.     Besides routine health maintenance, she has no other health concerns today .    HPI:  The patient doesn't have a PCP.     Never used the nuvaring. Felt good since the nexplanon was removed.    Feels she lost 20lb since nexplanon removed.     Thinking about TTC, not actively trying but leaving open.   Did buy a OTC fertiltiy test kit.  PNV    GYNECOLOGIC HISTORY:    Patient's last menstrual period was 2021 (exact date).    Regular menses? yes  Menses every 28 days.  Length of menses: 5 days    Her current contraception method is: pull out.  She  reports that she has never smoked. She has never used smokeless tobacco.    Patient is sexually active.  STD testing offered?  Declined  Last PHQ-9 score on record =   PHQ-9 SCORE 2021   PHQ-9 Total Score 4   Some encounter information is confidential and restricted. Go to Review Flowsheets activity to see all data.     Last GAD7 score on record =   ZANE-7 SCORE 2021   Total Score 8     Alcohol Score = 1    HEALTH MAINTENANCE:  Cholesterol: None found  Last Mammo: Not applicable, Result: Not applicable, Next Mammo: Due at age 40   Pap:   Lab Results   Component Value Date    PAP NIL 12/15/2017    PAP ASC-US 2012      Colonoscopy:  NA, Result: Not applicable, Next Colonoscopy: Age 45   Dexa:  NA    Health maintenance updated: Due for pap, HPV, also would like flu shot and tdap    HISTORY:  OB History    Para Term  AB Living   1 0 0 0 1 0   SAB TAB Ectopic Multiple Live Births   0 0 0 0 0      # Outcome Date GA Lbr Yoandy/2nd Weight Sex Delivery Anes PTL Lv   1 AB               Obstetric Comments   Meds used       Patient Active Problem List   Diagnosis     Attention deficit hyperactivity disorder (ADHD)     Nexplanon insertion     Past Surgical History:   Procedure Laterality Date     DENTAL SURGERY      wisdom teeth extraction      Social History     Tobacco Use      "Smoking status: Never Smoker     Smokeless tobacco: Never Used   Substance Use Topics     Alcohol use: Yes     Alcohol/week: 1.0 standard drinks     Types: 1 Glasses of wine per week     Binge frequency: Monthly      Problem (# of Occurrences) Relation (Name,Age of Onset)    Cerebrovascular Disease (1) Father:      Hypertension (1) Father    No Known Problems (7) Mother, Sister, Brother, Maternal Grandmother, Maternal Grandfather, Paternal Grandmother, Other            Current Outpatient Medications   Medication Sig     amphetamine-dextroamphetamine (ADDERALL XR) 20 MG 24 hr capsule Take 1 capsule (20 mg) by mouth daily     DULoxetine (CYMBALTA) 60 MG capsule Take 1 capsule (60 mg) by mouth daily Please keep 20 clinic appt for further refills.     LORazepam (ATIVAN) 0.5 MG tablet Take 1 tablet by mouth daily as needed for anxiety.     No current facility-administered medications for this visit.      No Known Allergies    Past medical, surgical, social and family histories were reviewed and updated in EPIC.    ROS:   12 point review of systems negative other than symptoms noted below or in the HPI.      EXAM:  /64   Pulse 68   Ht 1.689 m (5' 6.5\")   Wt 58.1 kg (128 lb)   LMP 2021 (Exact Date)   BMI 20.35 kg/m     BMI: Body mass index is 20.35 kg/m .    PHYSICAL EXAM:  Constitutional:   Appearance: Well nourished, well developed, alert, in no acute distress  Neck:  Lymph Nodes:  No lymphadenopathy present    Thyroid:  Gland size normal, nontender, no nodules or masses present  on palpation  Chest:  Respiratory Effort:  Breathing unlabored  Cardiovascular:    Heart: Auscultation:  Regular rate, normal rhythm, no murmurs present  Breasts: Inspection of Breasts:  No lymphadenopathy present., Palpation of Breasts and Axillae:  No masses present on palpation, no breast tenderness., Axillary Lymph Nodes:  No lymphadenopathy present. and No nodularity, asymmetry or nipple discharge " bilaterally.  Gastrointestinal:   Abdominal Examination:  Abdomen nontender to palpation, tone normal without rigidity or guarding, no masses present, umbilicus without lesions   Liver and Spleen:  No hepatomegaly present, liver nontender to palpation    Hernias:  No hernias present  Lymphatic: Lymph Nodes:  No other lymphadenopathy present  Skin:  General Inspection:  No rashes present, no lesions present, no areas of  discoloration  Neurologic:    Mental Status:  Oriented X3.  Normal strength and tone, sensory exam                grossly normal, mentation intact and speech normal.    Psychiatric:   Mentation appears normal and affect normal/bright.         Pelvic Exam:  External Genitalia:     Normal appearance for age, no discharge present, no tenderness present, no inflammatory lesions present, color normal  Vagina:     Normal vaginal vault without central or paravaginal defects, no discharge present, no inflammatory lesions present, no masses present  Bladder:     Nontender to palpation  Urethra:   Urethral Body:  Urethra palpation normal, urethra structural support normal   Urethral Meatus:  No erythema or lesions present  Cervix:     Appearance healthy, no lesions present, nontender to palpation, no bleeding present  Uterus:     Uterus: firm, normal sized and nontender, midplane in position.   Adnexa:     No adnexal tenderness present, no adnexal masses present  Perineum:     Perineum within normal limits, no evidence of trauma, no rashes or skin lesions present  Anus:     Anus within normal limits, no hemorrhoids present  Inguinal Lymph Nodes:     No lymphadenopathy present  Pubic Hair:     Normal pubic hair distribution for age  Genitalia and Groin:     No rashes present, no lesions present, no areas of discoloration, no masses present      COUNSELING:   Reviewed preventive health counseling, as reflected in patient instructions       Family planning       Folic Acid    BMI: Body mass index is 20.35  kg/m .      ASSESSMENT:  30 year old female with satisfactory annual exam.    ICD-10-CM    1. Encounter for gynecological examination without abnormal finding  Z01.419 Pap imaged thin layer screen with HPV - recommended age 30 - 65     HPV High Risk Types DNA Cervical   2. Need for prophylactic vaccination and inoculation against influenza  Z23 INFLUENZA VACCINE IM > 6 MONTHS VALENT IIV4 [79070]   3. Need for Tdap vaccination  Z23 TDAP VACCINE (Adacel, Boostrix)  [[id number]]       PLAN:  -pap/hpv obtained for cervical cancer screening. Reviewed guidelines-pap q 3yrs until age 30 when co-testing q 5 years.  -Breast self awareness discussed. Age 40 for mammogram.  -Discussed exercise-making plan, strength training. Nutrition encouraged.  -Colonoscopy age 45  -Osteoporosis prevention discussed.  -Return one year for next annual exam      Zee Bautista Masters, DO

## 2021-02-19 ENCOUNTER — OFFICE VISIT (OUTPATIENT)
Dept: OBGYN | Facility: CLINIC | Age: 31
End: 2021-02-19
Payer: COMMERCIAL

## 2021-02-19 VITALS
HEART RATE: 68 BPM | DIASTOLIC BLOOD PRESSURE: 64 MMHG | SYSTOLIC BLOOD PRESSURE: 114 MMHG | WEIGHT: 128 LBS | HEIGHT: 67 IN | BODY MASS INDEX: 20.09 KG/M2

## 2021-02-19 DIAGNOSIS — Z23 NEED FOR PROPHYLACTIC VACCINATION AND INOCULATION AGAINST INFLUENZA: ICD-10-CM

## 2021-02-19 DIAGNOSIS — Z01.419 ENCOUNTER FOR GYNECOLOGICAL EXAMINATION WITHOUT ABNORMAL FINDING: Primary | ICD-10-CM

## 2021-02-19 DIAGNOSIS — Z23 NEED FOR TDAP VACCINATION: ICD-10-CM

## 2021-02-19 PROCEDURE — 87624 HPV HI-RISK TYP POOLED RSLT: CPT | Performed by: OBSTETRICS & GYNECOLOGY

## 2021-02-19 PROCEDURE — 90686 IIV4 VACC NO PRSV 0.5 ML IM: CPT | Performed by: OBSTETRICS & GYNECOLOGY

## 2021-02-19 PROCEDURE — G0145 SCR C/V CYTO,THINLAYER,RESCR: HCPCS | Performed by: OBSTETRICS & GYNECOLOGY

## 2021-02-19 PROCEDURE — 90472 IMMUNIZATION ADMIN EACH ADD: CPT | Performed by: OBSTETRICS & GYNECOLOGY

## 2021-02-19 PROCEDURE — 90715 TDAP VACCINE 7 YRS/> IM: CPT | Performed by: OBSTETRICS & GYNECOLOGY

## 2021-02-19 PROCEDURE — 99395 PREV VISIT EST AGE 18-39: CPT | Mod: 25 | Performed by: OBSTETRICS & GYNECOLOGY

## 2021-02-19 PROCEDURE — 90471 IMMUNIZATION ADMIN: CPT | Performed by: OBSTETRICS & GYNECOLOGY

## 2021-02-19 ASSESSMENT — ANXIETY QUESTIONNAIRES
GAD7 TOTAL SCORE: 8
5. BEING SO RESTLESS THAT IT IS HARD TO SIT STILL: NOT AT ALL
2. NOT BEING ABLE TO STOP OR CONTROL WORRYING: SEVERAL DAYS
1. FEELING NERVOUS, ANXIOUS, OR ON EDGE: MORE THAN HALF THE DAYS
IF YOU CHECKED OFF ANY PROBLEMS ON THIS QUESTIONNAIRE, HOW DIFFICULT HAVE THESE PROBLEMS MADE IT FOR YOU TO DO YOUR WORK, TAKE CARE OF THINGS AT HOME, OR GET ALONG WITH OTHER PEOPLE: SOMEWHAT DIFFICULT
3. WORRYING TOO MUCH ABOUT DIFFERENT THINGS: SEVERAL DAYS
7. FEELING AFRAID AS IF SOMETHING AWFUL MIGHT HAPPEN: SEVERAL DAYS
6. BECOMING EASILY ANNOYED OR IRRITABLE: MORE THAN HALF THE DAYS

## 2021-02-19 ASSESSMENT — PATIENT HEALTH QUESTIONNAIRE - PHQ9
SUM OF ALL RESPONSES TO PHQ QUESTIONS 1-9: 4
5. POOR APPETITE OR OVEREATING: SEVERAL DAYS

## 2021-02-19 ASSESSMENT — MIFFLIN-ST. JEOR: SCORE: 1325.29

## 2021-02-19 NOTE — PROGRESS NOTES
Prior to immunization administration, verified patients identity using patient s name and date of birth. Please see Immunization Activity for additional information.     Screening Questionnaire for Adult Immunization    Are you sick today?   No   Do you have allergies to medications, food, a vaccine component or latex?   No   Have you ever had a serious reaction after receiving a vaccination?   No   Do you have a long-term health problem with heart, lung, kidney, or metabolic disease (e.g., diabetes), asthma, a blood disorder, no spleen, complement component deficiency, a cochlear implant, or a spinal fluid leak?  Are you on long-term aspirin therapy?   No   Do you have cancer, leukemia, HIV/AIDS, or any other immune system problem?   No   Do you have a parent, brother, or sister with an immune system problem?   No   In the past 3 months, have you taken medications that affect  your immune system, such as prednisone, other steroids, or anticancer drugs; drugs for the treatment of rheumatoid arthritis, Crohn s disease, or psoriasis; or have you had radiation treatments?   No   Have you had a seizure, or a brain or other nervous system problem?   No   During the past year, have you received a transfusion of blood or blood    products, or been given immune (gamma) globulin or antiviral drug?   No   For women: Are you pregnant or is there a chance you could become       pregnant during the next month?   No   Have you received any vaccinations in the past 4 weeks?   No     Immunization questionnaire answers were all negative.        Per orders of Dr. Brooks, injection of Tdap and flu given by Merlene Whitfield CMA. Patient instructed to remain in clinic for 15 minutes afterwards, and to report any adverse reaction to me immediately.       Screening performed by Merlnee Whitfield CMA on 2/19/2021 at 12:29 PM.

## 2021-02-20 ASSESSMENT — ANXIETY QUESTIONNAIRES: GAD7 TOTAL SCORE: 8

## 2021-02-23 LAB
COPATH REPORT: NORMAL
PAP: NORMAL

## 2021-02-25 ENCOUNTER — VIRTUAL VISIT (OUTPATIENT)
Dept: PSYCHIATRY | Facility: CLINIC | Age: 31
End: 2021-02-25
Payer: COMMERCIAL

## 2021-02-25 DIAGNOSIS — F90.2 ATTENTION DEFICIT HYPERACTIVITY DISORDER (ADHD), COMBINED TYPE: ICD-10-CM

## 2021-02-25 DIAGNOSIS — F33.1 MAJOR DEPRESSIVE DISORDER, RECURRENT EPISODE, MODERATE (H): Primary | ICD-10-CM

## 2021-02-25 LAB
FINAL DIAGNOSIS: NORMAL
HPV HR 12 DNA CVX QL NAA+PROBE: NEGATIVE
HPV16 DNA SPEC QL NAA+PROBE: NEGATIVE
HPV18 DNA SPEC QL NAA+PROBE: NEGATIVE
SPECIMEN DESCRIPTION: NORMAL
SPECIMEN SOURCE CVX/VAG CYTO: NORMAL

## 2021-02-25 RX ORDER — DEXTROAMPHETAMINE SACCHARATE, AMPHETAMINE ASPARTATE MONOHYDRATE, DEXTROAMPHETAMINE SULFATE AND AMPHETAMINE SULFATE 5; 5; 5; 5 MG/1; MG/1; MG/1; MG/1
20 CAPSULE, EXTENDED RELEASE ORAL DAILY
Qty: 30 CAPSULE | Refills: 0 | Status: SHIPPED | OUTPATIENT
Start: 2021-04-28 | End: 2021-05-31

## 2021-02-25 RX ORDER — DEXTROAMPHETAMINE SACCHARATE, AMPHETAMINE ASPARTATE MONOHYDRATE, DEXTROAMPHETAMINE SULFATE AND AMPHETAMINE SULFATE 5; 5; 5; 5 MG/1; MG/1; MG/1; MG/1
20 CAPSULE, EXTENDED RELEASE ORAL DAILY
Qty: 30 CAPSULE | Refills: 0 | Status: SHIPPED | OUTPATIENT
Start: 2021-03-28 | End: 2021-04-27

## 2021-02-25 RX ORDER — LORAZEPAM 0.5 MG/1
TABLET ORAL
Qty: 30 TABLET | Refills: 2 | Status: SHIPPED | OUTPATIENT
Start: 2021-02-25 | End: 2021-07-08

## 2021-02-25 RX ORDER — DULOXETIN HYDROCHLORIDE 60 MG/1
60 CAPSULE, DELAYED RELEASE ORAL DAILY
Qty: 90 CAPSULE | Refills: 1 | Status: SHIPPED | OUTPATIENT
Start: 2021-02-25 | End: 2021-07-08

## 2021-02-25 RX ORDER — DEXTROAMPHETAMINE SACCHARATE, AMPHETAMINE ASPARTATE MONOHYDRATE, DEXTROAMPHETAMINE SULFATE AND AMPHETAMINE SULFATE 5; 5; 5; 5 MG/1; MG/1; MG/1; MG/1
20 CAPSULE, EXTENDED RELEASE ORAL DAILY
Qty: 30 CAPSULE | Refills: 0 | Status: SHIPPED | OUTPATIENT
Start: 2021-02-25 | End: 2021-03-27

## 2021-02-25 ASSESSMENT — PATIENT HEALTH QUESTIONNAIRE - PHQ9: SUM OF ALL RESPONSES TO PHQ QUESTIONS 1-9: 3
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
## 2021-04-14 ENCOUNTER — OFFICE VISIT (OUTPATIENT)
Dept: NURSING | Facility: CLINIC | Age: 31
End: 2021-04-14
Payer: COMMERCIAL

## 2021-04-14 PROCEDURE — 91300 PR COVID VAC PFIZER DIL RECON 30 MCG/0.3 ML IM: CPT

## 2021-04-14 PROCEDURE — 0001A PR COVID VAC PFIZER DIL RECON 30 MCG/0.3 ML IM: CPT

## 2021-04-20 ENCOUNTER — TELEPHONE (OUTPATIENT)
Dept: PSYCHIATRY | Facility: CLINIC | Age: 31
End: 2021-04-20

## 2021-04-20 NOTE — TELEPHONE ENCOUNTER
LVM to schedule appt with Dr. Wilkins per pt mychart request. Provided clinic number to call and schedule.

## 2021-05-05 ENCOUNTER — IMMUNIZATION (OUTPATIENT)
Dept: NURSING | Facility: CLINIC | Age: 31
End: 2021-05-05
Attending: INTERNAL MEDICINE
Payer: COMMERCIAL

## 2021-05-05 PROCEDURE — 0002A PR COVID VAC PFIZER DIL RECON 30 MCG/0.3 ML IM: CPT

## 2021-05-05 PROCEDURE — 91300 PR COVID VAC PFIZER DIL RECON 30 MCG/0.3 ML IM: CPT

## 2021-05-31 ENCOUNTER — MYC REFILL (OUTPATIENT)
Dept: PSYCHIATRY | Facility: CLINIC | Age: 31
End: 2021-05-31

## 2021-05-31 DIAGNOSIS — F90.2 ATTENTION DEFICIT HYPERACTIVITY DISORDER (ADHD), COMBINED TYPE: ICD-10-CM

## 2021-06-01 ENCOUNTER — MYC REFILL (OUTPATIENT)
Dept: PSYCHIATRY | Facility: CLINIC | Age: 31
End: 2021-06-01

## 2021-06-01 DIAGNOSIS — F90.2 ATTENTION DEFICIT HYPERACTIVITY DISORDER (ADHD), COMBINED TYPE: ICD-10-CM

## 2021-06-01 RX ORDER — DEXTROAMPHETAMINE SACCHARATE, AMPHETAMINE ASPARTATE MONOHYDRATE, DEXTROAMPHETAMINE SULFATE AND AMPHETAMINE SULFATE 5; 5; 5; 5 MG/1; MG/1; MG/1; MG/1
20 CAPSULE, EXTENDED RELEASE ORAL DAILY
Qty: 30 CAPSULE | Refills: 0 | Status: SHIPPED | OUTPATIENT
Start: 2021-06-01 | End: 2021-11-23

## 2021-06-01 RX ORDER — DEXTROAMPHETAMINE SACCHARATE, AMPHETAMINE ASPARTATE MONOHYDRATE, DEXTROAMPHETAMINE SULFATE AND AMPHETAMINE SULFATE 5; 5; 5; 5 MG/1; MG/1; MG/1; MG/1
20 CAPSULE, EXTENDED RELEASE ORAL DAILY
Qty: 30 CAPSULE | Refills: 0 | Status: CANCELLED | OUTPATIENT
Start: 2021-06-01

## 2021-06-01 NOTE — TELEPHONE ENCOUNTER
Called patient and LVM to let her know the medication request has been refilled and available for pickup at her pharmacy.

## 2021-06-01 NOTE — TELEPHONE ENCOUNTER
Last seen: 02/25/2021  RTC: 3 months   Cancel: none  No-show: none  Next appt: none second attempt to schedule appt, LVM    Incoming refill from patient via [facility]    Medication requested: Adderall  XR 20 MG  Directions: take 1 capsule by mouth daily  Qty: 30  Last refilled: 04/28/2021    Medication routed to provider.

## 2021-06-02 ENCOUNTER — TELEPHONE (OUTPATIENT)
Dept: PSYCHIATRY | Facility: CLINIC | Age: 31
End: 2021-06-02

## 2021-06-02 NOTE — TELEPHONE ENCOUNTER
LVM to schedule return appt w Dr. Wilkins, per mychart request, last seen 2/25/21, provided clinic number for call back.

## 2021-07-08 ENCOUNTER — VIRTUAL VISIT (OUTPATIENT)
Dept: PSYCHIATRY | Facility: CLINIC | Age: 31
End: 2021-07-08
Payer: COMMERCIAL

## 2021-07-08 DIAGNOSIS — F33.1 MAJOR DEPRESSIVE DISORDER, RECURRENT EPISODE, MODERATE (H): Primary | ICD-10-CM

## 2021-07-08 RX ORDER — DEXTROAMPHETAMINE SACCHARATE, AMPHETAMINE ASPARTATE MONOHYDRATE, DEXTROAMPHETAMINE SULFATE AND AMPHETAMINE SULFATE 5; 5; 5; 5 MG/1; MG/1; MG/1; MG/1
20 CAPSULE, EXTENDED RELEASE ORAL DAILY
Qty: 30 CAPSULE | Refills: 0 | Status: SHIPPED | OUTPATIENT
Start: 2021-08-08 | End: 2021-09-07

## 2021-07-08 RX ORDER — DEXTROAMPHETAMINE SACCHARATE, AMPHETAMINE ASPARTATE MONOHYDRATE, DEXTROAMPHETAMINE SULFATE AND AMPHETAMINE SULFATE 5; 5; 5; 5 MG/1; MG/1; MG/1; MG/1
20 CAPSULE, EXTENDED RELEASE ORAL DAILY
Qty: 30 CAPSULE | Refills: 0 | Status: SHIPPED | OUTPATIENT
Start: 2021-09-08 | End: 2021-10-08

## 2021-07-08 RX ORDER — LORAZEPAM 0.5 MG/1
TABLET ORAL
Qty: 30 TABLET | Refills: 2 | Status: SHIPPED | OUTPATIENT
Start: 2021-07-08 | End: 2022-05-05

## 2021-07-08 RX ORDER — DULOXETIN HYDROCHLORIDE 60 MG/1
60 CAPSULE, DELAYED RELEASE ORAL DAILY
Qty: 90 CAPSULE | Refills: 1 | Status: SHIPPED | OUTPATIENT
Start: 2021-07-08 | End: 2022-01-27

## 2021-07-08 RX ORDER — DEXTROAMPHETAMINE SACCHARATE, AMPHETAMINE ASPARTATE MONOHYDRATE, DEXTROAMPHETAMINE SULFATE AND AMPHETAMINE SULFATE 5; 5; 5; 5 MG/1; MG/1; MG/1; MG/1
20 CAPSULE, EXTENDED RELEASE ORAL DAILY
Qty: 30 CAPSULE | Refills: 0 | Status: SHIPPED | OUTPATIENT
Start: 2021-07-08 | End: 2021-08-07

## 2021-07-08 ASSESSMENT — PATIENT HEALTH QUESTIONNAIRE - PHQ9: SUM OF ALL RESPONSES TO PHQ QUESTIONS 1-9: 10
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
## 2021-07-08 NOTE — PROGRESS NOTES
Saskia is a 31 year old who is being evaluated via a billable video visit.      How would you like to obtain your AVS? MyChart  If the video visit is dropped, the invitation should be resent by: Text to cell phone: [phone]  Will anyone else be joining your video visit? No    Depression Response    Patient completed the PHQ-9 assessment for depression and scored >9? Yes  Question 9 on the PHQ-9 was positive for suicidality? Yes  Does patient have current mental health provider? Yes    Is this a virtual visit? Yes   Does patient have suicidal ideation (positive question 9)? Yes, she will talk to Dr. Wilkins about a referral to a therapist.

## 2021-07-08 NOTE — PROGRESS NOTES
"Saskia is a 30 year old who is being evaluated via a billable video visit.      How would you like to obtain your AVS? MyChart  If the video visit is dropped, the invitation should be resent by: Send to link via text  at: [phone] Will anyone else be joining your video visit? No      Video Start Time: 11:52 AM  Video-Visit Details    Type of service:  Video Visit    Video End Time:12:22 PM    Originating Location (pt. Location): Home    Distant Location (provider location):  [facility]     Platform used for Video Visit: [facility]     PSYCHIATRY CLINIC PROGRESS NOTE  Medication management & Psychotherapy    IDENTIFICATION:  Saskia Ortega is a 29 year old female with previous psychiatric diagnoses of major depressive disorder, recurrent, generalized anxiety disorder, and ADHD.  Patient presents for ongoing psychiatric follow-up and was seen for initial evaluation on 5/04/2012.    SUBJECTIVE:  The patient was last seen in clinic on 4/16/2020 at which time no medication changes were made.  Since the time of the last visit:     Pt reports that she has been doing well but with a lot of \"stressors on her plate.\" She and partner are planning construction on a house that they recently purchased. They are also trying to plan a wedding and to start a family soon.     Processed current stressors and questions of self-doubt and negative self-talk about capacity in context of multiple activities she is leading. Overall is feeling that relationship with boyfriend is strong and that she is hopeful about future with him.    Overall, continues to feel that current medication regimen is managing mood and anxiety well. Also, feels ADHD symptoms are well-managed with current medication regimen. Sleep continues to be improved with re-addition of lorazepam to medication list.    Denies other side effects to medication.    Symptoms:  Denies inattention in the afternoon. Denies sleep disruption. Denies infrequent low mood, anhedonia, " "fatigue, self-derogatory thoughts, appetite disturbance, psychomotor slowing or concentration problems.  Denies suicidal ideation.  Ongoing periodic anxiety.   Medication side-effects:  Previously experienced fatigue, weight gain, and loss of libido associated with sertraline as well as fatigue and amotivation with higher dose citalopram.  Medical ROS:     Cardiovascular: negative for palpitations, tachycardia  Gastrointestinal: negative for increased appetite, nausea, vomiting, constipation and diarrhea  Neurologic: negative for headaches and cognitive problems  Psychiatric: negative for sleep disturbance, increased stress, feeling anxious, thoughts of self-harm, agitation and sexual difficulties.    MEDICAL TEAM:         - Primary Medical Provider:  unknown  - Therapist: none currently (previously followed by this provider for supportive psychotherapy)    ALLERGIES: NKDA    MEDICATIONS:  Adderall XR 20 mg daily  Adderall IR 10 mg qAfternoon  Duloxetine 60 mg daily   Lorazepam 0.5 mg daily PRN anxiety/sleep    LABS: no new results  VITALS: There were no vitals taken for this visit.    OBJECTIVE:   Alert and oriented.  Well groomed, calm, cooperative with good eye contact.  No problems with speech or psychomotor behavior.  Mood was described as \"good.\" Affect was euthymic with improved brightness, congruent to speech content. Thought process was unremarkable and thought content was congruent to speech content, and devoid of suicidal and homicidal ideation and psychotic thought.  No hallucinations.  Insight was good.  Judgment was intact and adequate for safety.  Patient demonstrates no obvious problems with attention, concentration, language, short or long term memory by observation of conversation.  These were not formally tested.  Fund of knowledge was intact.    ASSESSMENT:    Historical:  Saskia Ortega is a 29 year old  female with history of depression and anxiety who presents for follow-up medication " management. She was transitioned from Effexor to Celexa in spring/summer 2013 with good results.  She was previously seen for monthly combination medication management and psychotherapy by her last provider.  She describes obtaining benefit from this and requested to increase frequency of sessions to every other week to work on processing family issues which have become more apparent after beginning a new relationship.  She continued to find benefit from Adderall for ADD and lorazepam as PRN for anxiety. She took them as prescribed and had no issues with substance abuse.  Pt has a history trials of Effexor and citalopram which were both effective for managing depression but discontinued secondary to side effects.  In addition, trial of Vyvanse was attempted during fall 2014, however, pt did not find it as effective as Adderall for management of ADD sx and so was transitioned back to Adderall.  At January 2015 visit, pt described significant worsening of sx of depression and cross-titration from citalopram to sertraline was initiated.  She tolerated transition well and initially felt mood was well managed at 150 mg daily.    Current:  Today, pt reports mood, anxiety, attention, and sleep are well managed with current medication regimen. Denies ongoing difficulties in relationship with boyfriend, however, appears to be navigating this well with appropriate emphasis on maintaining her mental health.    For the future, may consider increasing dose of duloxetine to 90 mg vs trial of escitalopram and/or bupropion should depression symptoms or anxiety worsen.    The risks, benefits, alternatives and potential adverse effects have been explained and are understood by the patient.  The patient agrees to the plan with the capacity to do so.  The patient knows to call the clinic for any problems or access emergency care if needed. The patient is not pregnant.  She is not abusing substances and shows no evidence for abuse of  medication.  Controlled substances are still appropriate and required.  No medical contraindications to treatment.    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  (Refills x 3 months sent 02/25/21).  -- Continue lorazepam 0.5 mg daily PRN anxiety/insomnia.(Refills x 3 months provided today, 02/25/21).  -- Continue Adderall XR 20 mg daily. (Refills x 3 months provided today, 02/25/21).  Psychotherapy:   Will continue with combined psychotherapy and medication management every 3 months.  RTC:  3 months for 30 min. U  Labs/Monitoring:    -- Recommend pt's weight NOT be checked prior to appointments.  -- Continue to monitor BP while on [facility]        PSYCHIATRY CLINIC INDIVIDUAL PSYCHOTHERAPY NOTE                                                   [16]   Start time: 11:53pm  End time: 12:10pm  Date reviewed: reviewed treatment plan 02/25/21; will collect signature at next in person visit       Date next due: 05/25/21  Subjective: This supportive psychotherapy session addressed issues related to patient's history, current stressors, life stressors, relationships and work .  Patient's reaction: Preparatory in the context of mental status appropriate for ambulatory setting.  Progress: good  Plan: RTC 3 months  Psychotherapy services during this visit included  myself and Saskia Ortega.   TREATMENT  PLAN          SYMPTOMS; PROBLEMS   MEASURABLE GOALS;    FUNCTIONAL IMPROVEMENT INTERVENTIONS;   GAINS MADE DISCHARGE CRITERIA   Depression: depressed mood   report feeling more positive about self  strength focus marked symptom improvement   ADHD: inattention; Paying attention to details and Listening    develop strategies for thought distraction  when ruminating strength focus marked symptom improvement         PROVIDER:  MD MITCHEL Mcmanus MD  [facility]  Department of Psychiatry      Level of Medical Decision Making:   Problems addressed: - At least 2 stable chronic problems  Element 3 - Risk:[id number]}  - Moderate due to: Engaged in prescription drug management during visit (discussed any medication benefits, side effects, alternatives, etc.)

## 2021-07-12 ENCOUNTER — TELEPHONE (OUTPATIENT)
Dept: PSYCHIATRY | Facility: CLINIC | Age: 31
End: 2021-07-12

## 2021-07-12 NOTE — TELEPHONE ENCOUNTER
LVM for check out to schedule return appt w Dr. Wilkins, follow up in 3 months, provided clinic number for call back.

## 2021-08-15 ENCOUNTER — MYC REFILL (OUTPATIENT)
Dept: PSYCHIATRY | Facility: CLINIC | Age: 31
End: 2021-08-15

## 2021-08-15 DIAGNOSIS — F33.1 MAJOR DEPRESSIVE DISORDER, RECURRENT EPISODE, MODERATE (H): ICD-10-CM

## 2021-08-15 RX ORDER — DEXTROAMPHETAMINE SACCHARATE, AMPHETAMINE ASPARTATE MONOHYDRATE, DEXTROAMPHETAMINE SULFATE AND AMPHETAMINE SULFATE 5; 5; 5; 5 MG/1; MG/1; MG/1; MG/1
20 CAPSULE, EXTENDED RELEASE ORAL DAILY
Qty: 30 CAPSULE | Refills: 0 | Status: CANCELLED | OUTPATIENT
Start: 2021-08-15

## 2021-10-02 ENCOUNTER — HEALTH MAINTENANCE LETTER (OUTPATIENT)
Age: 31
End: 2021-10-02

## 2021-11-23 ENCOUNTER — MYC REFILL (OUTPATIENT)
Dept: PSYCHIATRY | Facility: CLINIC | Age: 31
End: 2021-11-23
Payer: COMMERCIAL

## 2021-11-23 DIAGNOSIS — F90.2 ATTENTION DEFICIT HYPERACTIVITY DISORDER (ADHD), COMBINED TYPE: ICD-10-CM

## 2021-11-23 RX ORDER — DEXTROAMPHETAMINE SACCHARATE, AMPHETAMINE ASPARTATE MONOHYDRATE, DEXTROAMPHETAMINE SULFATE AND AMPHETAMINE SULFATE 5; 5; 5; 5 MG/1; MG/1; MG/1; MG/1
20 CAPSULE, EXTENDED RELEASE ORAL DAILY
Qty: 30 CAPSULE | Refills: 0 | Status: SHIPPED | OUTPATIENT
Start: 2021-11-23 | End: 2022-01-26

## 2021-11-23 NOTE — TELEPHONE ENCOUNTER
Last seen: 07/08/2021  RTC: 3 months   Cancel: none  No-show: none  Next appt: none,  messeged and will reach out to patient to get scheduled    Incoming refill from patient via phone    Medication requested: Adderall 20 mg   Directions: take 1 capsule (20 mg) by mouth daily  Qty: 30  Last refilled: 06/01/2021    Medication routed to provider, controlled substance.

## 2022-01-21 DIAGNOSIS — F33.1 MAJOR DEPRESSIVE DISORDER, RECURRENT EPISODE, MODERATE (H): ICD-10-CM

## 2022-01-26 ENCOUNTER — MYC REFILL (OUTPATIENT)
Dept: PSYCHIATRY | Facility: CLINIC | Age: 32
End: 2022-01-26
Payer: COMMERCIAL

## 2022-01-26 DIAGNOSIS — F90.2 ATTENTION DEFICIT HYPERACTIVITY DISORDER (ADHD), COMBINED TYPE: ICD-10-CM

## 2022-01-26 NOTE — TELEPHONE ENCOUNTER
Last seen: 07/08/2021  RTC: 3 months   Cancel: none  No-show: none  Next appt: 03/08/2022    Incoming refill from patient via phone    Medication requested: Adderall XR 20 MG 24 hr capsule  Directions: take 1 capsule (20 mg) by mouth daily  Qty: 30  Last refilled: 11/23/2021    Medication refill routed to provider controlled substance.

## 2022-01-26 NOTE — TELEPHONE ENCOUNTER
Medication requested: DULoxetine (CYMBALTA) 60 MG capsule  Last refilled: 7/8/21  Qty: 90/1      Last seen: 7/8/21  RTC: 3 MONTH  Cancel: 0  No-show: 0  Next appt: 3/10/22    Refill decision:   Refill pended and routed to the provider for review/determination due to   Pt outside of RTC timeframe.  SCHEDULED FOR Follow-up 3/10/22

## 2022-01-27 ENCOUNTER — MYC REFILL (OUTPATIENT)
Dept: PSYCHIATRY | Facility: CLINIC | Age: 32
End: 2022-01-27
Payer: COMMERCIAL

## 2022-01-27 DIAGNOSIS — F33.1 MAJOR DEPRESSIVE DISORDER, RECURRENT EPISODE, MODERATE (H): ICD-10-CM

## 2022-01-27 DIAGNOSIS — F90.2 ATTENTION DEFICIT HYPERACTIVITY DISORDER (ADHD), COMBINED TYPE: ICD-10-CM

## 2022-01-27 RX ORDER — DEXTROAMPHETAMINE SACCHARATE, AMPHETAMINE ASPARTATE MONOHYDRATE, DEXTROAMPHETAMINE SULFATE AND AMPHETAMINE SULFATE 5; 5; 5; 5 MG/1; MG/1; MG/1; MG/1
20 CAPSULE, EXTENDED RELEASE ORAL DAILY
Qty: 30 CAPSULE | Refills: 0 | Status: CANCELLED | OUTPATIENT
Start: 2022-01-27

## 2022-01-27 NOTE — TELEPHONE ENCOUNTER
Last seen: 07/08/2021  RTC: 3 months   Cancel: none  No-show: none  Next appt: 03/08/2022     Incoming refill from patient via phone    Medication requested: Cymbalta 60 MG capsule   Directions: take 1 capsule (60 mg) by mouth daily, please keep 03/10/22  Qty: 30  Last refilled: 07/08/2021    Medication refill approved per refill protocol

## 2022-01-28 RX ORDER — DULOXETIN HYDROCHLORIDE 60 MG/1
60 CAPSULE, DELAYED RELEASE ORAL DAILY
Qty: 30 CAPSULE | Refills: 1 | Status: SHIPPED | OUTPATIENT
Start: 2022-01-28 | End: 2022-02-24

## 2022-01-28 RX ORDER — DULOXETIN HYDROCHLORIDE 60 MG/1
60 CAPSULE, DELAYED RELEASE ORAL DAILY
Qty: 60 CAPSULE | Refills: 0 | Status: SHIPPED | OUTPATIENT
Start: 2022-01-28 | End: 2022-02-23

## 2022-01-28 RX ORDER — DEXTROAMPHETAMINE SACCHARATE, AMPHETAMINE ASPARTATE MONOHYDRATE, DEXTROAMPHETAMINE SULFATE AND AMPHETAMINE SULFATE 5; 5; 5; 5 MG/1; MG/1; MG/1; MG/1
20 CAPSULE, EXTENDED RELEASE ORAL DAILY
Qty: 30 CAPSULE | Refills: 0 | Status: SHIPPED | OUTPATIENT
Start: 2022-01-28 | End: 2022-02-23

## 2022-02-23 ENCOUNTER — MYC REFILL (OUTPATIENT)
Dept: PSYCHIATRY | Facility: CLINIC | Age: 32
End: 2022-02-23
Payer: COMMERCIAL

## 2022-02-23 DIAGNOSIS — F90.2 ATTENTION DEFICIT HYPERACTIVITY DISORDER (ADHD), COMBINED TYPE: ICD-10-CM

## 2022-02-23 DIAGNOSIS — F33.1 MAJOR DEPRESSIVE DISORDER, RECURRENT EPISODE, MODERATE (H): ICD-10-CM

## 2022-02-24 RX ORDER — DULOXETIN HYDROCHLORIDE 60 MG/1
60 CAPSULE, DELAYED RELEASE ORAL DAILY
Qty: 30 CAPSULE | Refills: 0 | Status: SHIPPED | OUTPATIENT
Start: 2022-02-24 | End: 2022-03-10

## 2022-02-24 RX ORDER — DEXTROAMPHETAMINE SACCHARATE, AMPHETAMINE ASPARTATE MONOHYDRATE, DEXTROAMPHETAMINE SULFATE AND AMPHETAMINE SULFATE 5; 5; 5; 5 MG/1; MG/1; MG/1; MG/1
20 CAPSULE, EXTENDED RELEASE ORAL DAILY
Qty: 30 CAPSULE | Refills: 0 | Status: SHIPPED | OUTPATIENT
Start: 2022-02-24 | End: 2022-05-05

## 2022-02-24 NOTE — TELEPHONE ENCOUNTER
Last seen: 07/08/2021  RTC: 3 months   Cancel: none  No-show: none  Next appt: 03/08/2022    Incoming refill from patient via mychart    Medication requested: Cymbalta 60 MG capsule   Directions: take 1 capsule (60 mg) by mouth daily please keep appt on 03/08  Qty: 30  Last refilled:    Medication requested: Adderall 20 MG 24 hr capsule   Directions: take 1 caapsule (20 mg) by mouth daily  Qty: 30  Last refilled: 01/28/2022    Medication refill routed to provider controlled substance.

## 2022-03-10 ENCOUNTER — VIRTUAL VISIT (OUTPATIENT)
Dept: PSYCHIATRY | Facility: CLINIC | Age: 32
End: 2022-03-10
Payer: COMMERCIAL

## 2022-03-10 DIAGNOSIS — F33.1 MAJOR DEPRESSIVE DISORDER, RECURRENT EPISODE, MODERATE (H): Primary | ICD-10-CM

## 2022-03-10 RX ORDER — DEXTROAMPHETAMINE SACCHARATE, AMPHETAMINE ASPARTATE MONOHYDRATE, DEXTROAMPHETAMINE SULFATE AND AMPHETAMINE SULFATE 5; 5; 5; 5 MG/1; MG/1; MG/1; MG/1
20 CAPSULE, EXTENDED RELEASE ORAL DAILY
Qty: 30 CAPSULE | Refills: 0 | Status: SHIPPED | OUTPATIENT
Start: 2022-05-11 | End: 2022-05-05

## 2022-03-10 RX ORDER — DEXTROAMPHETAMINE SACCHARATE, AMPHETAMINE ASPARTATE MONOHYDRATE, DEXTROAMPHETAMINE SULFATE AND AMPHETAMINE SULFATE 5; 5; 5; 5 MG/1; MG/1; MG/1; MG/1
20 CAPSULE, EXTENDED RELEASE ORAL DAILY
Qty: 30 CAPSULE | Refills: 0 | Status: SHIPPED | OUTPATIENT
Start: 2022-03-10 | End: 2022-04-09

## 2022-03-10 RX ORDER — DEXTROAMPHETAMINE SACCHARATE, AMPHETAMINE ASPARTATE MONOHYDRATE, DEXTROAMPHETAMINE SULFATE AND AMPHETAMINE SULFATE 5; 5; 5; 5 MG/1; MG/1; MG/1; MG/1
20 CAPSULE, EXTENDED RELEASE ORAL DAILY
Qty: 30 CAPSULE | Refills: 0 | Status: SHIPPED | OUTPATIENT
Start: 2022-04-10 | End: 2022-05-05

## 2022-03-10 ASSESSMENT — PATIENT HEALTH QUESTIONNAIRE - PHQ9: SUM OF ALL RESPONSES TO PHQ QUESTIONS 1-9: 0

## 2022-03-10 NOTE — PROGRESS NOTES
Saskia is a 31 year old who is being evaluated via a billable video visit.      How would you like to obtain your AVS? MyChart  If the video visit is dropped, the invitation should be resent by: Send to e-mail at: [email or web address]  Will anyone else be joining your video visit? No

## 2022-03-10 NOTE — PROGRESS NOTES
"Saskia is a 30 year old who is being evaluated via a billable video visit.      How would you like to obtain your AVS? MyChart  If the video visit is dropped, the invitation should be resent by: Send to link via text  at: [phone] Will anyone else be joining your video visit? No      Video Start Time: 10:45 AM  Video-Visit Details    Type of service:  Video Visit    Video End Time:11:15 PM    Originating Location (pt. Location): Home    Distant Location (provider location):  [facility]     Platform used for Video Visit: [facility]     PSYCHIATRY CLINIC PROGRESS NOTE  Medication management & Psychotherapy    IDENTIFICATION:  Saskia Ortega is a 29 year old female with previous psychiatric diagnoses of major depressive disorder, recurrent, generalized anxiety disorder, and ADHD.  Patient presents for ongoing psychiatric follow-up and was seen for initial evaluation on 5/04/2021.    SUBJECTIVE:  The patient was last seen in clinic on 7/08/2021 at which time no medication changes were made.  Since the time of the last visit:     Pt reports that she has been doing well but with a lot of \"stressors on her plate.\" She and partner are planning construction on a house that they recently purchased. They are also trying to plan a wedding and to start a family soon.     Things are going \"really really well\"     Stable on current medications, No interest in changes     ADHD is controlled, work has been \"great\" and she even got promoted     She has only been taking about half of the XR (opening capsule and taking half of the beads)    Has not refilled lorazepam for a few months, no issues controlling anxiety or difficulty with sleep     Things with her fiance Miles are going well. They have not done couples therapy yet though they have both been reading/learning to communicate. House building is going well.     Symptoms:  Denies inattention in the afternoon. Denies sleep disruption. Denies infrequent low mood, anhedonia, fatigue, " "self-derogatory thoughts, appetite disturbance, psychomotor slowing or concentration problems.  Denies suicidal ideation.  Anxiety controlled, not impairing.   Medication side-effects:  Previously experienced fatigue, weight gain, and loss of libido associated with sertraline as well as fatigue and amotivation with higher dose citalopram.  Medical ROS:     Cardiovascular: negative for palpitations, tachycardia  Gastrointestinal: negative for increased appetite, nausea, vomiting, constipation and diarrhea  Neurologic: negative for headaches and cognitive problems  Psychiatric: negative for sleep disturbance, increased stress, feeling anxious, thoughts of self-harm, agitation and sexual difficulties.    MEDICAL TEAM:         - Primary Medical Provider:  unknown  - Therapist: none currently (previously followed by this provider for supportive psychotherapy)    ALLERGIES: NKDA    MEDICATIONS:  Adderall XR 20 mg daily  Adderall IR 10 mg qAfternoon  Duloxetine 60 mg daily   Lorazepam 0.5 mg daily PRN anxiety/sleep    LABS: no new results  VITALS: There were no vitals taken for this visit.    OBJECTIVE:   Alert and oriented.  Well groomed, calm, cooperative with good eye contact.  No problems with speech or psychomotor behavior.  Mood was described as \"very very good.\" Affect was euthymic with apparent brightness, congruent to speech content. Thought process was unremarkable and thought content was congruent to speech content, and devoid of suicidal and homicidal ideation and psychotic thought.  No hallucinations.  Insight was good.  Judgment was intact and adequate for safety.  Patient demonstrates no obvious problems with attention, concentration, language, short or long term memory by observation of conversation.  These were not formally tested.  Fund of knowledge was intact.    ASSESSMENT:    Historical:  Saskia Ortega is a 29 year old  female with history of depression and anxiety who presents for follow-up " medication management. She was transitioned from Effexor to Celexa in spring/summer 2013 with good results.  She was previously seen for monthly combination medication management and psychotherapy by her last provider.  She describes obtaining benefit from this and requested to increase frequency of sessions to every other week to work on processing family issues which have become more apparent after beginning a new relationship.  She continued to find benefit from Adderall for ADD and lorazepam as PRN for anxiety. She took them as prescribed and had no issues with substance abuse.  Pt has a history trials of Effexor and citalopram which were both effective for managing depression but discontinued secondary to side effects.  In addition, trial of Vyvanse was attempted during fall 2014, however, pt did not find it as effective as Adderall for management of ADD sx and so was transitioned back to Adderall.  At January 2015 visit, pt described significant worsening of sx of depression and cross-titration from citalopram to sertraline was initiated.  She tolerated transition well and initially felt mood was well managed at 150 mg daily.     Current:  Today, pt reports mood, anxiety, attention, and sleep are well managed with current medication regimen. Denies ongoing difficulties in relationship with boyfriend, however, appears to be navigating this well with appropriate emphasis on maintaining her mental health. She high been highly functional with ADHD controlled, she has been promoted at work since last visit.     For the future, may consider increasing dose of duloxetine to 90 mg vs trial of escitalopram and/or bupropion should depression symptoms or anxiety worsen.    The risks, benefits, alternatives and potential adverse effects have been explained and are understood by the patient.  The patient agrees to the plan with the capacity to do so.  The patient knows to call the clinic for any problems or access  emergency care if needed. The patient is not pregnant.  She is not abusing substances and shows no evidence for abuse of medication.  Controlled substances are still appropriate and required.  No medical contraindications to treatment.    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  (Refills x 3 months sent 3/10/22).  -- Continue lorazepam 0.5 mg daily PRN anxiety/insomnia.(Refills x 3 months provided today, 02/25/21).  -- Continue Adderall XR 20 mg daily. (Refills x 3 months provided today, 3/10/22).  Psychotherapy:   Will continue with combined psychotherapy and medication management every 3 months.  RTC:  3 months for 30 min. U  Labs/Monitoring:    -- Recommend pt's weight NOT be checked prior to appointments.  -- Continue to monitor BP while on Cymbalta      This patient has been discussed with and seen by my attending who agrees with my assessment and plan.     Parth Tan MD  PGY2 Psychiatry Resident    Attestation:  I, Yessica Wilkins MD,  have personally performed an examination of this patient on March 10, 2022 and I have reviewed the resident's documentation.  I have edited the note to reflect all relevant changes. I agree with the resident findings and plan in this resident note.  I have reviewed all vitals and laboratory findings.       Yessica Wilkins MD  [facility]

## 2022-03-19 ENCOUNTER — HEALTH MAINTENANCE LETTER (OUTPATIENT)
Age: 32
End: 2022-03-19

## 2022-04-07 RX ORDER — DULOXETIN HYDROCHLORIDE 60 MG/1
60 CAPSULE, DELAYED RELEASE ORAL DAILY
Qty: 90 CAPSULE | Refills: 1 | Status: SHIPPED | OUTPATIENT
Start: 2022-04-07 | End: 2022-10-27

## 2022-04-20 ENCOUNTER — TELEPHONE (OUTPATIENT)
Dept: OBGYN | Facility: CLINIC | Age: 32
End: 2022-04-20
Payer: COMMERCIAL

## 2022-04-20 NOTE — TELEPHONE ENCOUNTER
Patients mother called and asked to speak with Dr. Brooks nurse regarding her daughter having severe nausea. Please call mom back at [phone] (consent on file).

## 2022-04-21 NOTE — TELEPHONE ENCOUNTER
LMP 3/10/22 - 6w0d    NO updated PHI on file; mother not on PHI in past record of 2/19/21    Attempted to call patient to get more information and give advice but no answer. Left detailed vm and informed a [employer] message was sent w tips and pay attention to #12 to start OTC options prior to rx meds. See mychart message sent by this RN.  Call if wishes to speak to nurse    Nathalie Lugo RN on 4/21/2022 at 9:40 AM

## 2022-04-22 ENCOUNTER — HOSPITAL ENCOUNTER (EMERGENCY)
Facility: CLINIC | Age: 32
Discharge: HOME | End: 2022-04-22
Attending: EMERGENCY MEDICINE | Admitting: EMERGENCY MEDICINE
Payer: COMMERCIAL

## 2022-04-22 VITALS
HEIGHT: 66 IN | RESPIRATION RATE: 20 BRPM | TEMPERATURE: 98.3 F | SYSTOLIC BLOOD PRESSURE: 103 MMHG | DIASTOLIC BLOOD PRESSURE: 69 MMHG | HEART RATE: 76 BPM | BODY MASS INDEX: 20.09 KG/M2 | WEIGHT: 125 LBS | OXYGEN SATURATION: 99 %

## 2022-04-22 DIAGNOSIS — R11.2 NAUSEA AND VOMITING, INTRACTABILITY OF VOMITING NOT SPECIFIED, UNSPECIFIED VOMITING TYPE: ICD-10-CM

## 2022-04-22 DIAGNOSIS — Z34.91 FIRST TRIMESTER PREGNANCY: ICD-10-CM

## 2022-04-22 LAB
ALBUMIN SERPL-MCNC: 4.1 G/DL (ref 3.4–5)
ALP SERPL-CCNC: 43 U/L (ref 40–150)
ALT SERPL W P-5'-P-CCNC: 25 U/L (ref 0–50)
ANION GAP SERPL CALCULATED.3IONS-SCNC: 7 MMOL/L (ref 3–14)
AST SERPL W P-5'-P-CCNC: 13 U/L (ref 0–45)
B-HCG SERPL-ACNC: ABNORMAL IU/L (ref 0–5)
BASOPHILS # BLD AUTO: 0.1 10E3/UL (ref 0–0.2)
BASOPHILS NFR BLD AUTO: 1 %
BILIRUB DIRECT SERPL-MCNC: 0.2 MG/DL (ref 0–0.2)
BILIRUB SERPL-MCNC: 0.6 MG/DL (ref 0.2–1.3)
BUN SERPL-MCNC: 11 MG/DL (ref 7–30)
CALCIUM SERPL-MCNC: 9.4 MG/DL (ref 8.5–10.1)
CHLORIDE BLD-SCNC: 102 MMOL/L (ref 94–109)
CO2 SERPL-SCNC: 26 MMOL/L (ref 20–32)
CREAT SERPL-MCNC: 0.7 MG/DL (ref 0.52–1.04)
EOSINOPHIL # BLD AUTO: 0.3 10E3/UL (ref 0–0.7)
EOSINOPHIL NFR BLD AUTO: 3 %
ERYTHROCYTE [DISTWIDTH] IN BLOOD BY AUTOMATED COUNT: 11.9 % (ref 10–15)
GFR SERPL CREATININE-BSD FRML MDRD: >90 ML/MIN/1.73M2
GLUCOSE BLD-MCNC: 112 MG/DL (ref 70–99)
HCT VFR BLD AUTO: 41.9 % (ref 35–47)
HGB BLD-MCNC: 14.3 G/DL (ref 11.7–15.7)
IMM GRANULOCYTES # BLD: 0 10E3/UL
IMM GRANULOCYTES NFR BLD: 0 %
LIPASE SERPL-CCNC: 65 U/L (ref 73–393)
LYMPHOCYTES # BLD AUTO: 2.1 10E3/UL (ref 0.8–5.3)
LYMPHOCYTES NFR BLD AUTO: 20 %
MCH RBC QN AUTO: 31.1 PG (ref 26.5–33)
MCHC RBC AUTO-ENTMCNC: 34.1 G/DL (ref 31.5–36.5)
MCV RBC AUTO: 91 FL (ref 78–100)
MONOCYTES # BLD AUTO: 0.7 10E3/UL (ref 0–1.3)
MONOCYTES NFR BLD AUTO: 7 %
NEUTROPHILS # BLD AUTO: 7.1 10E3/UL (ref 1.6–8.3)
NEUTROPHILS NFR BLD AUTO: 69 %
NRBC # BLD AUTO: 0 10E3/UL
NRBC BLD AUTO-RTO: 0 /100
PLATELET # BLD AUTO: 326 10E3/UL (ref 150–450)
POTASSIUM BLD-SCNC: 3.8 MMOL/L (ref 3.4–5.3)
PROT SERPL-MCNC: 7.6 G/DL (ref 6.8–8.8)
RBC # BLD AUTO: 4.6 10E6/UL (ref 3.8–5.2)
SODIUM SERPL-SCNC: 135 MMOL/L (ref 133–144)
WBC # BLD AUTO: 10.3 10E3/UL (ref 4–11)

## 2022-04-22 PROCEDURE — 99284 EMERGENCY DEPT VISIT MOD MDM: CPT | Mod: 25

## 2022-04-22 PROCEDURE — 96375 TX/PRO/DX INJ NEW DRUG ADDON: CPT

## 2022-04-22 PROCEDURE — 84702 CHORIONIC GONADOTROPIN TEST: CPT | Performed by: EMERGENCY MEDICINE

## 2022-04-22 PROCEDURE — 82248 BILIRUBIN DIRECT: CPT | Performed by: EMERGENCY MEDICINE

## 2022-04-22 PROCEDURE — 80053 COMPREHEN METABOLIC PANEL: CPT | Performed by: EMERGENCY MEDICINE

## 2022-04-22 PROCEDURE — 250N000011 HC RX IP 250 OP 636: Performed by: EMERGENCY MEDICINE

## 2022-04-22 PROCEDURE — 250N000011 HC RX IP 250 OP 636

## 2022-04-22 PROCEDURE — 96365 THER/PROPH/DIAG IV INF INIT: CPT

## 2022-04-22 PROCEDURE — 83690 ASSAY OF LIPASE: CPT | Performed by: EMERGENCY MEDICINE

## 2022-04-22 PROCEDURE — 85004 AUTOMATED DIFF WBC COUNT: CPT | Performed by: EMERGENCY MEDICINE

## 2022-04-22 PROCEDURE — 36415 COLL VENOUS BLD VENIPUNCTURE: CPT | Performed by: EMERGENCY MEDICINE

## 2022-04-22 RX ORDER — ONDANSETRON 2 MG/ML
INJECTION INTRAMUSCULAR; INTRAVENOUS
Status: COMPLETED
Start: 2022-04-22 | End: 2022-04-22

## 2022-04-22 RX ORDER — METOCLOPRAMIDE 5 MG/1
5 TABLET ORAL 3 TIMES DAILY PRN
Qty: 15 TABLET | Refills: 0 | Status: SHIPPED | OUTPATIENT
Start: 2022-04-22 | End: 2022-05-09

## 2022-04-22 RX ORDER — ONDANSETRON 2 MG/ML
4 INJECTION INTRAMUSCULAR; INTRAVENOUS ONCE
Status: COMPLETED | OUTPATIENT
Start: 2022-04-22 | End: 2022-04-22

## 2022-04-22 RX ORDER — METOCLOPRAMIDE HYDROCHLORIDE 5 MG/ML
5 INJECTION INTRAMUSCULAR; INTRAVENOUS ONCE
Status: COMPLETED | OUTPATIENT
Start: 2022-04-22 | End: 2022-04-22

## 2022-04-22 RX ORDER — PYRIDOXINE HCL (VITAMIN B6) 25 MG
25 TABLET ORAL EVERY 8 HOURS PRN
Qty: 15 TABLET | Refills: 0 | Status: SHIPPED | OUTPATIENT
Start: 2022-04-22 | End: 2022-05-10

## 2022-04-22 RX ADMIN — METOCLOPRAMIDE 5 MG: 5 INJECTION, SOLUTION INTRAMUSCULAR; INTRAVENOUS at 11:41

## 2022-04-22 RX ADMIN — ONDANSETRON 4 MG: 2 INJECTION INTRAMUSCULAR; INTRAVENOUS at 10:12

## 2022-04-22 RX ADMIN — DEXTROSE AND SODIUM CHLORIDE 1000 ML: 5; 900 INJECTION, SOLUTION INTRAVENOUS at 10:43

## 2022-04-22 NOTE — ED PROVIDER NOTES
"  History   Chief Complaint:  Nausea & Vomiting     HPI  History supplemented by electronic chart review    Saskia Ortega is a 32 year old female who presents with nausea and vomiting. The patient reports that she had two positive home pregnancy tests recently, and her last menstrual cycle was March 10, and her cycle is normally regular. She began feeling nauseous with a headache Monday morning, four days ago, which she expected with pregnancy. She had episodes of nonbloody emesis and was unable to keep anything down. This continued the following day. Today, she has had similar headaches and vomiting, but is also feeling lightheaded. She has not eaten much besides crackers and toast since 4 days ago. She denies fever, diarrhea, or shortness of breath. This is her second pregnancy, and her first ended with an  in .  She is here because she would like to feel better.  She thinks that her symptoms are due to the pregnancy itself.  No vaginal bleeding, abdominal pain, pain elsewhere, or other symptoms.  She has not tried any antiemetic medicine at home.  She called her OB clinic and was referred to the emergency department for assessment.    Review of Systems   All other systems reviewed and are negative.    Allergies:  No known drug allergies.    Medications:  Adderall  Duloxetine  Lorazepam    Past Medical History:     Attention deficit hyperactivity disorder  Anxiety   Depression  Missed   Herpes simplex    Past Surgical History:    [place] teeth extraction    Family History:    Father- hypertension, cerebrovascular disease    Social History:  Presents with partner  Covid vaccinated    Physical Exam     Patient Vitals for the past 24 hrs:   BP Temp Pulse Resp SpO2 Height Weight   22 1208 -- -- -- -- 99 % -- --   22 1207 103/69 -- 76 -- -- -- --   22 1205 -- -- -- 20 100 % -- --   22 1003 121/76 98.3  F (36.8  C) (!) 133 20 100 % 1.676 m (5' 6\") 56.7 kg (125 lb)     Physical " Exam  General: Nontoxic-appearing woman sitting upright in room 30, partner at bedside  HENT: mucous membranes initially somewhat dry  CV: rate in 90s during my first assessment, no LE edema, normal radial pulses  Resp: normal effort, speaks in full, phrases, no stridor, no cough observed  GI: Abdomen soft, nontender, no uterine fundus palpable  MSK: no bony tenderness, no CVAT  Skin: appropriately warm and dry  Neuro: alert, clear speech, oriented   Psych: cooperative    Emergency Department Course   Laboratory:  Labs Ordered and Resulted from Time of ED Arrival to Time of ED Departure   BASIC METABOLIC PANEL - Abnormal       Result Value    Sodium 135      Potassium 3.8      Chloride 102      Carbon Dioxide (CO2) 26      Anion Gap 7      Urea Nitrogen 11      Creatinine 0.70      Calcium 9.4      Glucose 112 (*)     GFR Estimate >90     LIPASE - Abnormal    Lipase 65 (*)    HCG QUANTITATIVE PREGNANCY - Abnormal    hCG Quantitative 31,735 (*)    HEPATIC FUNCTION PANEL - Normal    Bilirubin Total 0.6      Bilirubin Direct 0.2      Protein Total 7.6      Albumin 4.1      Alkaline Phosphatase 43      AST 13      ALT 25     CBC WITH PLATELETS AND DIFFERENTIAL    WBC Count 10.3      RBC Count 4.60      Hemoglobin 14.3      Hematocrit 41.9      MCV 91      MCH 31.1      MCHC 34.1      RDW 11.9      Platelet Count 326      % Neutrophils 69      % Lymphocytes 20      % Monocytes 7      % Eosinophils 3      % Basophils 1      % Immature Granulocytes 0      NRBCs per 100 WBC 0      Absolute Neutrophils 7.1      Absolute Lymphocytes 2.1      Absolute Monocytes 0.7      Absolute Eosinophils 0.3      Absolute Basophils 0.1      Absolute Immature Granulocytes 0.0      Absolute NRBCs 0.0        Emergency Department Course:  Reviewed:  I reviewed nursing notes, vitals, past medical history, Care Everywhere and [facility]    Assessments:  1038 I obtained history and examined the patient as noted above.   1302 I rechecked the patient and  explained findings.     Interventions:  Medications   ondansetron (ZOFRAN) injection 4 mg (4 mg Intravenous Given 4/22/22 1012)   dextrose 5% and 0.9% NaCl BOLUS (0 mLs Intravenous Stopped 4/22/22 1140)   metoclopramide (REGLAN) injection 5 mg (5 mg Intravenous Given 4/22/22 1141)     Disposition:  The patient was discharged to home.     Impression & Plan   Medical Decision Making:  Both the patient and I think that her presenting symptoms are primarily due to to her first trimester pregnancy, which is confirmed by quantitative hCG level.  She does not have abdominal pain or other features to suggest an obstetric emergency and for this reason the patient and I agreed to defer emergent OB or abdominal ultrasound.  She felt dramatically improved with treatments provided and requested discharge.  I considered ectopic pregnancy, biliary colic, pancreatitis, infection, metabolic abnormality and others.  However, given her clinical improvement and in light of her assessment to this point, we agreed upon a plan for discharge and a trial of outpatient antiemetics with close OB follow-up.  She was asked to return here for sudden worsening at any hour.    Diagnosis:    ICD-10-CM    1. Nausea and vomiting, intractability of vomiting not specified, unspecified vomiting type  R11.2    2. First trimester pregnancy  Z34.91        Discharge Medications:  New Prescriptions    DOXYLAMINE (UNISOM) 25 MG TABS TABLET    Take 1 tablet (25 mg) by mouth every 8 hours as needed (nausea/vomiting)    METOCLOPRAMIDE (REGLAN) 5 MG TABLET    Take 1 tablet (5 mg) by mouth 3 times daily as needed As needed for nausea/vomiting    PYRIDOXINE (VITAMIN B6) 25 MG TABLET    Take 1 tablet (25 mg) by mouth every 8 hours as needed (nausea/vomiting) As needed for nausea/vomiting       Scribe Disclosure:  IKatja, am serving as a scribe at 10:29 AM on 4/22/2022 to document services personally performed by Alex Myers MD based on my  observations and the provider's statements to me.     This note was completed in part using Dragon voice recognition software. Although reviewed after completion, some word and grammatical errors may occur.             Alex Myers MD  04/22/22 1505

## 2022-04-22 NOTE — ED TRIAGE NOTES
Nausea and vomiting since Monday - pt is 6 weeks preg.   Denies any abd pain denies any vag bleeding   Pt unable to keep anything down  Dome dizziness

## 2022-05-02 ENCOUNTER — TELEPHONE (OUTPATIENT)
Dept: OBGYN | Facility: CLINIC | Age: 32
End: 2022-05-02
Payer: COMMERCIAL

## 2022-05-02 NOTE — TELEPHONE ENCOUNTER
Pt was in ER last week and would like to know if she needs to be seen sooner than 5/18. Please advise

## 2022-05-02 NOTE — TELEPHONE ENCOUNTER
lmp 3/10/22  7w4d      Seen in ER on 4/22/22:  Impression & Plan   Medical Decision Making:  Both the patient and I think that her presenting symptoms are primarily due to to her first trimester pregnancy, which is confirmed by quantitative hCG level.  She does not have abdominal pain or other features to suggest an obstetric emergency and for this reason the patient and I agreed to defer emergent OB or abdominal ultrasound.  She felt dramatically improved with treatments provided and requested discharge.  I considered ectopic pregnancy, biliary colic, pancreatitis, infection, metabolic abnormality and others.  However, given her clinical improvement and in light of her assessment to this point, we agreed upon a plan for discharge and a trial of outpatient antiemetics with close OB follow-up.  She was asked to return here for sudden worsening at any hour.        Left message for patient to return call to clinic to speak with triage.    Julia Law RN

## 2022-05-05 ENCOUNTER — PRENATAL OFFICE VISIT (OUTPATIENT)
Dept: NURSING | Facility: CLINIC | Age: 32
End: 2022-05-05
Payer: COMMERCIAL

## 2022-05-05 VITALS — HEIGHT: 66 IN | WEIGHT: 125 LBS | BODY MASS INDEX: 20.09 KG/M2

## 2022-05-05 DIAGNOSIS — O09.91 SUPERVISION OF HIGH RISK PREGNANCY IN FIRST TRIMESTER: ICD-10-CM

## 2022-05-05 DIAGNOSIS — O36.80X0 PREGNANCY WITH INCONCLUSIVE FETAL VIABILITY: ICD-10-CM

## 2022-05-05 DIAGNOSIS — Z13.79 GENETIC SCREENING: Primary | ICD-10-CM

## 2022-05-05 PROBLEM — O09.90 SUPERVISION OF HIGH-RISK PREGNANCY: Status: ACTIVE | Noted: 2022-05-05

## 2022-05-05 NOTE — PROGRESS NOTES
SUBJECTIVE:     HPI:    This is a 32 year old female patient,  who presents for her first obstetrical visit.    CORDELL: 12/15/2022, by Last Menstrual Period.  She is 8w0d weeks.  Her cycles are regular.  Her last menstrual period was normal.   Since her LMP, she has experienced  nausea-feeling much better-able to eat and stay hydrated.    Additional History:  AB, ADHD, anxiety, dep, RH neg    Have you travelled during the pregnancy?No  Have your sexual partner(s) travelled during the pregnancy?No    HISTORY:   Planned Pregnancy: Yes  Marital Status:   Occupation:   Living in Household: Miles Benitez's 13 yo daughter  Past History:  Her past medical history   Past Medical History:   Diagnosis Date     ADHD      Anxiety      Depression      Missed      meds   .      She has a history of  AB, ADHD, anxiety, dep, neg blood type    Since her last LMP she denies use of alcohol, tobacco and street drugs.    Past medical, surgical, social and family history were reviewed and updated in [facility].      Current Outpatient Medications   Medication     DULoxetine (CYMBALTA) 60 MG capsule     Prenat w/o J-PR-Gnrzmsd-FA-DHA (PNV-DHA PO)     doxylamine (UNISOM) 25 MG TABS tablet     metoclopramide (REGLAN) 5 MG tablet     pyridOXINE (VITAMIN B6) 25 MG tablet     No current facility-administered medications for this visit.       ROS:   12 point review of systems negative other than symptoms noted below or in the HPI.  Gastrointestinal: Nausea    Nurse phone visit completed. Prenatal book and folder containing standard educational hand-outs and brochures will be given at the next visit to patient. Information in folder sent via [employer] Questions answered. Information given on optional screening available to assess chromosomal anomalies. Pt desires NIPS. Pt advised to call the clinic if she has any questions or concerns related to her pregnancy. Prenatal labs future ordered.   Elysia Moser RN on  "5/5/2022 at 9:39 AM        Lab Results   Component Value Date    PAP NIL 02/19/2021     PHQ-9 score:    PHQ 5/5/2022   PHQ-9 Total Score 5   Q9: Thoughts of better off dead/self-harm past 2 weeks Not at all   Some encounter information is confidential and restricted. Go to Review Flowsheets activity to see all data.               ZANE-7 SCORE 12/15/2017 2/19/2021 5/5/2022   Total Score - - 2 (minimal anxiety)   Total Score 4 8 2             Patient supplied answers from flow sheet for:  Prenatal OB Questionnaire.  Past Medical History  Have you ever recieved care for your mental health? : No  Have you ever been in a major accident or suffered serious trauma?: No  Within the last year, has anyone hit, slapped, kicked or otherwise hurt you?: No  In the last year, has anyone forced you to have sex when you didn't want to?: No    Past Medical History 2   Have you ever received a blood transfusion?: No  Would you accept a blood transfusion if was medically recommended?: Yes  Does anyone in your home smoke?: (!) Yes   Is your blood type Rh negative?: (!) Yes  Have you ever ?: No  Have you been hospitalized for a nonsurgical reason excluding normal delivery?: No  Have you ever had an abnormal pap smear?: No    Past Medical History (Continued)  Do you have a history of abnormalities of the uterus?: No  Did your mother take MICKEY or any other hormones when she was pregnant with you?: No  Do you have any other problems we have not asked about which you feel may be important to this pregnancy?: No                   OBJECTIVE:     EXAM:  Ht 1.676 m (5' 6\")   Wt 56.7 kg (125 lb)   LMP 03/10/2022   BMI 20.18 kg/m   Body mass index is 20.18 kg/m .      "

## 2022-05-05 NOTE — PROGRESS NOTES
"  SUBJECTIVE:     HPI:    This is a 32 year old female patient,  who presents for her first obstetrical visit.    CORDELL: 12/15/2022, by Last Menstrual Period.  She is 8w0d weeks.  Her cycles are regular.  Her last menstrual period was normal.   Since her LMP, she has experienced  nausea-feeling much better-able to eat and stay hydrated.    Additional History:  AB, ADHD, anxiety, dep, RH neg    Have you travelled during the pregnancy?No  Have your sexual partner(s) travelled during the pregnancy?No    HISTORY:   Planned Pregnancy: Yes  Marital Status:   Occupation:   Living in Household: Miles Benitez's 15 yo daughter  Past History:  Her past medical history   Past Medical History:   Diagnosis Date     ADHD      Anxiety      Depression      Missed      meds   .      She has a history of  AB, ADHD, anxiety, dep, neg blood type    Since her last LMP she denies use of alcohol, tobacco and street drugs.    Past medical, surgical, social and family history were reviewed and updated in [facility].      Current Outpatient Medications   Medication     DULoxetine (CYMBALTA) 60 MG capsule     Prenat w/o A-KT-Vlmxvxq-FA-DHA (PNV-DHA PO)     doxylamine (UNISOM) 25 MG TABS tablet     metoclopramide (REGLAN) 5 MG tablet     pyridOXINE (VITAMIN B6) 25 MG tablet     No current facility-administered medications for this visit.       ROS:   12 point review of systems negative other than symptoms noted below or in the HPI.  Gastrointestinal: Nausea      OBJECTIVE:     EXAM:  Ht 1.676 m (5' 6\")   Wt 56.7 kg (125 lb)   LMP 03/10/2022   BMI 20.18 kg/m   Body mass index is 20.18 kg/m .      "

## 2022-05-18 ENCOUNTER — PRENATAL OFFICE VISIT (OUTPATIENT)
Dept: OBGYN | Facility: CLINIC | Age: 32
End: 2022-05-18

## 2022-05-18 ENCOUNTER — ANCILLARY PROCEDURE (OUTPATIENT)
Dept: ULTRASOUND IMAGING | Facility: CLINIC | Age: 32
End: 2022-05-18
Payer: COMMERCIAL

## 2022-05-18 VITALS — BODY MASS INDEX: 20.18 KG/M2 | SYSTOLIC BLOOD PRESSURE: 102 MMHG | DIASTOLIC BLOOD PRESSURE: 66 MMHG | WEIGHT: 125 LBS

## 2022-05-18 DIAGNOSIS — O99.340 ANXIETY IN PREGNANCY, ANTEPARTUM: ICD-10-CM

## 2022-05-18 DIAGNOSIS — O09.91 SUPERVISION OF HIGH RISK PREGNANCY IN FIRST TRIMESTER: Primary | ICD-10-CM

## 2022-05-18 DIAGNOSIS — Z11.3 SCREEN FOR STD (SEXUALLY TRANSMITTED DISEASE): ICD-10-CM

## 2022-05-18 DIAGNOSIS — F32.A DEPRESSIVE DISORDER IN MOTHER AFFECTING PREGNANCY: ICD-10-CM

## 2022-05-18 DIAGNOSIS — F41.9 ANXIETY IN PREGNANCY, ANTEPARTUM: ICD-10-CM

## 2022-05-18 DIAGNOSIS — O36.80X0 PREGNANCY WITH INCONCLUSIVE FETAL VIABILITY: ICD-10-CM

## 2022-05-18 DIAGNOSIS — Z11.8 SCREENING FOR CHLAMYDIAL DISEASE: ICD-10-CM

## 2022-05-18 DIAGNOSIS — Z29.13 NEED FOR RHOGAM DUE TO RH NEGATIVE MOTHER: ICD-10-CM

## 2022-05-18 DIAGNOSIS — O99.340 DEPRESSIVE DISORDER IN MOTHER AFFECTING PREGNANCY: ICD-10-CM

## 2022-05-18 PROCEDURE — 99207 PR FIRST OB VISIT: CPT | Performed by: OBSTETRICS & GYNECOLOGY

## 2022-05-18 PROCEDURE — 87491 CHLMYD TRACH DNA AMP PROBE: CPT | Performed by: OBSTETRICS & GYNECOLOGY

## 2022-05-18 PROCEDURE — 76817 TRANSVAGINAL US OBSTETRIC: CPT | Performed by: OBSTETRICS & GYNECOLOGY

## 2022-05-18 PROCEDURE — 87591 N.GONORRHOEAE DNA AMP PROB: CPT | Performed by: OBSTETRICS & GYNECOLOGY

## 2022-05-18 ASSESSMENT — PATIENT HEALTH QUESTIONNAIRE - PHQ9
5. POOR APPETITE OR OVEREATING: NOT AT ALL
SUM OF ALL RESPONSES TO PHQ QUESTIONS 1-9: 0

## 2022-05-18 ASSESSMENT — ANXIETY QUESTIONNAIRES
7. FEELING AFRAID AS IF SOMETHING AWFUL MIGHT HAPPEN: NOT AT ALL
1. FEELING NERVOUS, ANXIOUS, OR ON EDGE: NOT AT ALL
5. BEING SO RESTLESS THAT IT IS HARD TO SIT STILL: NOT AT ALL
6. BECOMING EASILY ANNOYED OR IRRITABLE: NOT AT ALL
IF YOU CHECKED OFF ANY PROBLEMS ON THIS QUESTIONNAIRE, HOW DIFFICULT HAVE THESE PROBLEMS MADE IT FOR YOU TO DO YOUR WORK, TAKE CARE OF THINGS AT HOME, OR GET ALONG WITH OTHER PEOPLE: NOT DIFFICULT AT ALL
GAD7 TOTAL SCORE: 0
2. NOT BEING ABLE TO STOP OR CONTROL WORRYING: NOT AT ALL
3. WORRYING TOO MUCH ABOUT DIFFERENT THINGS: NOT AT ALL
GAD7 TOTAL SCORE: 0

## 2022-05-18 NOTE — PROGRESS NOTES
SUBJECTIVE:      HPI:     This is a 32 year old female patient,  who presents for her first obstetrical visit.     CORDELL: 12/15/2022, by Last Menstrual Period.  She is 9+6wk.  Her cycles are regular.  Her last menstrual period was normal.   Since her LMP, she has experienced  nausea-feeling much better-able to eat and stay hydrated.     Additional History:      Had been feeling pretty sick in beginning.   Was using unisom/b6. Using reglan, which is helping. Got refill last week.     Has been difficult not taking the adderall.   Feels more tired.   Feels like mood is hanging in there. Partner agrees, she is doing well overall.       Completed review of PMH, SocHx, SurHx, FHx, medications, and care everywhere reviewed. Epic updated.       Have you travelled during the pregnancy?No  Have your sexual partner(s) travelled during the pregnancy?No     HISTORY:   Planned Pregnancy: Yes  Marital Status:   Occupation:   Living in Household: Miles Benitez's 15 yo daughter  Past History:  Her past medical history   Past Medical History        Past Medical History:   Diagnosis Date     ADHD       Anxiety       Depression       Missed        meds      .       She has a history of  AB, ADHD, anxiety, dep, neg blood type     Since her last LMP she denies use of alcohol, tobacco and street drugs.     Past medical, surgical, social and family history were reviewed and updated in EPIC.            Current Outpatient Medications   Medication     DULoxetine (CYMBALTA) 60 MG capsule     Prenat w/o T-OH-Rjyvxhc-FA-DHA (PNV-DHA PO)     doxylamine (UNISOM) 25 MG TABS tablet     metoclopramide (REGLAN) 5 MG tablet     pyridOXINE (VITAMIN B6) 25 MG tablet      No current facility-administered medications for this visit.         ROS:   12 point review of systems negative other than symptoms noted below or in the HPI.  Gastrointestinal: Nausea                Lab Results   Component Value Date     PAP NIL  02/19/2021      PHQ-9 score:    PHQ 5/5/2022   PHQ-9 Total Score 5   Q9: Thoughts of better off dead/self-harm past 2 weeks Not at all   Some encounter information is confidential and restricted. Go to Review Flowsheets activity to see all data.       ZANE-7 SCORE 12/15/2017 2/19/2021 5/5/2022   Total Score - - 2 (minimal anxiety)   Total Score 4 8 2           OBJECTIVE:     EXAM:  /66   Wt 56.7 kg (125 lb)   LMP 03/10/2022   BMI 20.18 kg/m   Body mass index is 20.18 kg/m .    GENERAL: healthy, alert and no distress  EYES: Eyes grossly normal to inspection, PERRL and conjunctivae and sclerae normal  HENT: ear canals and TM's normal, nose and mouth without ulcers or lesions  NECK: no adenopathy, no asymmetry, masses, or scars and thyroid normal to palpation  RESP: lungs clear to auscultation - no rales, rhonchi or wheezes  BREAST: normal without masses, tenderness or nipple discharge and no palpable axillary masses or adenopathy  CV: regular rate and rhythm, normal S1 S2, no S3 or S4, no murmur, click or rub, no peripheral edema and peripheral pulses strong  ABDOMEN: soft, nontender, no hepatosplenomegaly, no masses and bowel sounds normal   (female): normal female external genitalia, normal urethral meatus, vaginal mucosa pink, moist, well rugated, and normal cervix/adnexa/uterus without masses or discharge  MS: no gross musculoskeletal defects noted, no edema  SKIN: no suspicious lesions or rashes  NEURO: Normal strength and tone, mentation intact and speech normal  PSYCH: mentation appears normal, affect normal/bright      Results for orders placed or performed in visit on 05/18/22   US OB Transvaginal Only ([id number])    Narrative       Obstetrical Ultrasound Report  OB U/S  < 14 Weeks -  Transvaginal  [facility]  Referring Provider: Dr. Zee Brooks  Sonographer: Dorys Jeff RDMS  Indication:  Viability check      Dating (mm/dd/yyyy):   LMP: 03/10/22                 EDC:   12/15/22  GA by LMP:         9w6d     Current Scan On:  2022          EDC:  12/15/22  GA by Current Scan:        9w6d  The calculation of the gestational age by current scan was based on CRL.  Anatomy Scan:  Zaman gestation.  Biometry:  CRL                       2.98 cm                9w6d                      Yolk Sac               3.8 mm                                                     Fetal heart activity:  Rate and rhythm is within normal limits.  Fetal   heart rate: 167 bpm  Findings: Viable IUP     Maternal Structures:  Cervix: The cervix appears long and closed.  Right Ovary: Wnl  Left Ovary: Wnl    Impression:   Viable zaman gestation at 9 weeks 6 days. US today concurs with   patient's LMP dating giving final EDC of 12/15/22. Normal adnexa.   Recommend anatomy US at 18-22 weeks gestation.  Zee Bautista Masters, DO                    ASSESSMENT/PLAN:       ICD-10-CM    1. Supervision of high risk pregnancy in first trimester  O09.91    2. Anxiety in pregnancy, antepartum  O99.340     F41.9    3. Depressive disorder in mother affecting pregnancy  O99.340     F32.9    4. Screening for chlamydial disease  Z11.8 CHLAMYDIA TRACHOMATIS PCR   5. Screen for STD (sexually transmitted disease)  Z11.3 NEISSERIA GONORRHOEA PCR   6. Need for rhogam due to Rh negative mother  Z29.13        32 year old , On May 18, 2022 patient is 9w6d weeks of pregnancy with CORDELL of 12/15/2022, by Last Menstrual Period c/w 9wk US      PLAN/PATIENT INSTRUCTIONS:    -UTD cervical cancer screening.  -NOB labs tomorrow, orders reviewed. GC/C today  -Discussed carrier and aneuploidy screening. Discussed what the disorders are the tests look for, what the risk factors may be for the conditions, what the testing options are, that they are elective and not required, and the information which is being obtained in these tests. Discussed why one might choose to do these screening tests or why they may not, how the information  can be used. For aneuploidy testing, discussed screening test options, how screening differs from diagnostic testing, and what the diagnostic tests are. Discussed that a negative test does not eliminate the chance for a chromosomal or other genetic disorder to be present in the child. For pricing information, recommend pt contact [facility] directly to determine estimation of costs as they may not be covered by insurance. Patient chose to deisres testing. Wants to know sex by mychart  -Rh neg. Plan Rhogam 28wk. Discussed what this is and how cna be impactful in pregnancy/after delivery  -Anxiety,depression, managed on cymbalta.   Doing OK off adderall. Cont to monitor mood  -N/V. Discussed meds. Cont unisom/b6, reglan  -miscarriage precautions reviewed. F/U 4wk    Zee Bautista Masters, DO

## 2022-05-20 LAB
C TRACH DNA SPEC QL NAA+PROBE: NEGATIVE
N GONORRHOEA DNA SPEC QL NAA+PROBE: NEGATIVE

## 2022-05-25 ENCOUNTER — LAB (OUTPATIENT)
Dept: LAB | Facility: CLINIC | Age: 32
End: 2022-05-25
Payer: COMMERCIAL

## 2022-05-25 DIAGNOSIS — Z13.79 GENETIC SCREENING: ICD-10-CM

## 2022-05-25 DIAGNOSIS — O09.91 SUPERVISION OF HIGH RISK PREGNANCY IN FIRST TRIMESTER: ICD-10-CM

## 2022-05-25 DIAGNOSIS — Z13.71 SCREENING FOR GENETIC DISEASE CARRIER STATUS: Primary | ICD-10-CM

## 2022-05-25 LAB
ABO/RH(D): NORMAL
ALBUMIN UR-MCNC: NEGATIVE MG/DL
ANTIBODY SCREEN: NEGATIVE
APPEARANCE UR: CLEAR
BILIRUB UR QL STRIP: NEGATIVE
COLOR UR AUTO: YELLOW
GLUCOSE UR STRIP-MCNC: NEGATIVE MG/DL
HGB UR QL STRIP: NEGATIVE
KETONES UR STRIP-MCNC: NEGATIVE MG/DL
LEUKOCYTE ESTERASE UR QL STRIP: NEGATIVE
NITRATE UR QL: NEGATIVE
PH UR STRIP: 6.5 [PH] (ref 5–7)
SP GR UR STRIP: 1.02 (ref 1–1.03)
SPECIMEN EXPIRATION DATE: NORMAL
UROBILINOGEN UR STRIP-ACNC: 0.2 E.U./DL

## 2022-05-25 PROCEDURE — 87389 HIV-1 AG W/HIV-1&-2 AB AG IA: CPT

## 2022-05-25 PROCEDURE — 86900 BLOOD TYPING SEROLOGIC ABO: CPT

## 2022-05-25 PROCEDURE — 86850 RBC ANTIBODY SCREEN: CPT

## 2022-05-25 PROCEDURE — 87086 URINE CULTURE/COLONY COUNT: CPT

## 2022-05-25 PROCEDURE — 81003 URINALYSIS AUTO W/O SCOPE: CPT

## 2022-05-25 PROCEDURE — 36415 COLL VENOUS BLD VENIPUNCTURE: CPT

## 2022-05-25 PROCEDURE — 87340 HEPATITIS B SURFACE AG IA: CPT

## 2022-05-25 PROCEDURE — 86901 BLOOD TYPING SEROLOGIC RH(D): CPT

## 2022-05-25 PROCEDURE — 86803 HEPATITIS C AB TEST: CPT

## 2022-05-25 PROCEDURE — 86762 RUBELLA ANTIBODY: CPT

## 2022-05-25 PROCEDURE — 86780 TREPONEMA PALLIDUM: CPT

## 2022-05-26 LAB
HBV SURFACE AG SERPL QL IA: NONREACTIVE
HCV AB SERPL QL IA: NONREACTIVE
HIV 1+2 AB+HIV1 P24 AG SERPL QL IA: NONREACTIVE
RUBV IGG SERPL QL IA: 15.5 INDEX
RUBV IGG SERPL QL IA: POSITIVE
T PALLIDUM AB SER QL: NONREACTIVE

## 2022-05-27 LAB — BACTERIA UR CULT: NO GROWTH

## 2022-06-07 LAB — SCANNED LAB RESULT: NORMAL

## 2022-06-13 ENCOUNTER — PRENATAL OFFICE VISIT (OUTPATIENT)
Dept: OBGYN | Facility: CLINIC | Age: 32
End: 2022-06-13
Payer: COMMERCIAL

## 2022-06-13 VITALS — WEIGHT: 132 LBS | DIASTOLIC BLOOD PRESSURE: 66 MMHG | BODY MASS INDEX: 21.31 KG/M2 | SYSTOLIC BLOOD PRESSURE: 106 MMHG

## 2022-06-13 DIAGNOSIS — O99.340 DEPRESSIVE DISORDER IN MOTHER AFFECTING PREGNANCY: ICD-10-CM

## 2022-06-13 DIAGNOSIS — O09.91 SUPERVISION OF HIGH RISK PREGNANCY IN FIRST TRIMESTER: Primary | ICD-10-CM

## 2022-06-13 DIAGNOSIS — F32.A DEPRESSIVE DISORDER IN MOTHER AFFECTING PREGNANCY: ICD-10-CM

## 2022-06-13 DIAGNOSIS — Z86.59 HISTORY OF EATING DISORDER: ICD-10-CM

## 2022-06-13 DIAGNOSIS — O99.340 ANXIETY IN PREGNANCY, ANTEPARTUM: ICD-10-CM

## 2022-06-13 DIAGNOSIS — F41.9 ANXIETY IN PREGNANCY, ANTEPARTUM: ICD-10-CM

## 2022-06-13 PROCEDURE — 99207 PR PRENATAL VISIT: CPT | Performed by: OBSTETRICS & GYNECOLOGY

## 2022-06-13 NOTE — PROGRESS NOTES
No loss of fluid/vaginal bleeding/regular contractions. No FM  Does feel like her depression is worsening. Also struggled with eating disorder when she was younger. But knows this is part of it, is aware.   Has a psychiatrist, has been trying to get in with her.  Nausea is improved. Fatigue still present.   _Rec f/u with therapist. May get referral from me if cannot find therapist at her current mental health office.   - Labor precautions. F/U 4wk    Zee Bautista Masters, DO

## 2022-08-03 ENCOUNTER — ANCILLARY PROCEDURE (OUTPATIENT)
Dept: ULTRASOUND IMAGING | Facility: CLINIC | Age: 32
End: 2022-08-03
Payer: COMMERCIAL

## 2022-08-03 ENCOUNTER — PRENATAL OFFICE VISIT (OUTPATIENT)
Dept: OBGYN | Facility: CLINIC | Age: 32
End: 2022-08-03

## 2022-08-03 VITALS — SYSTOLIC BLOOD PRESSURE: 102 MMHG | DIASTOLIC BLOOD PRESSURE: 62 MMHG | WEIGHT: 138 LBS | BODY MASS INDEX: 22.27 KG/M2

## 2022-08-03 DIAGNOSIS — O09.91 SUPERVISION OF HIGH RISK PREGNANCY IN FIRST TRIMESTER: ICD-10-CM

## 2022-08-03 DIAGNOSIS — Z86.59 HISTORY OF EATING DISORDER: ICD-10-CM

## 2022-08-03 DIAGNOSIS — Z29.13 NEED FOR RHOGAM DUE TO RH NEGATIVE MOTHER: ICD-10-CM

## 2022-08-03 DIAGNOSIS — F90.9 ATTENTION DEFICIT HYPERACTIVITY DISORDER (ADHD), UNSPECIFIED ADHD TYPE: ICD-10-CM

## 2022-08-03 DIAGNOSIS — O09.92 SUPERVISION OF HIGH RISK PREGNANCY IN SECOND TRIMESTER: Primary | ICD-10-CM

## 2022-08-03 DIAGNOSIS — O99.340 ANXIETY IN PREGNANCY, ANTEPARTUM: ICD-10-CM

## 2022-08-03 DIAGNOSIS — F41.9 ANXIETY IN PREGNANCY, ANTEPARTUM: ICD-10-CM

## 2022-08-03 DIAGNOSIS — F32.A DEPRESSIVE DISORDER IN MOTHER AFFECTING PREGNANCY: ICD-10-CM

## 2022-08-03 DIAGNOSIS — O99.340 DEPRESSIVE DISORDER IN MOTHER AFFECTING PREGNANCY: ICD-10-CM

## 2022-08-03 PROCEDURE — 76805 OB US >/= 14 WKS SNGL FETUS: CPT | Performed by: OBSTETRICS & GYNECOLOGY

## 2022-08-03 PROCEDURE — 99207 PR PRENATAL VISIT: CPT | Performed by: OBSTETRICS & GYNECOLOGY

## 2022-08-03 NOTE — PROGRESS NOTES
OB Visit @ 20w6d     No loss of fluid/vaginal bleeding/regular contractions. + FM  Has been feeling better in the last 2 weeks. Did not get into the therapist or psychiatrist since our last visit. Will be seeing her soon for regular check up.   Has been having HAs in the past month. Started decreasing her coffee intake, didn't want it anymore. HAs have improved.        ICD-10-CM    1. Supervision of high risk pregnancy in second trimester  O09.92    2. Depressive disorder in mother affecting pregnancy  O99.340     F32.9    3. History of eating disorder  Z86.59    4. Anxiety in pregnancy, antepartum  O99.340     F41.9    5. Need for rhogam due to Rh negative mother  Z29.13    6. Attention deficit hyperactivity disorder (ADHD), unspecified ADHD type  F90.9        -Anxiety/depression/ADHD/eating disorder hx. On cymbalta. Stable. Follows with Psych  -Reviewed normal anatomy US with Saskia  - Labor precautions. F/U 4wk    Zee Bautista Masters, DO

## 2022-08-25 DIAGNOSIS — Z36.9 ENCOUNTER FOR ANTENATAL SCREENING OF MOTHER: ICD-10-CM

## 2022-08-25 DIAGNOSIS — Z29.13 NEED FOR RHOGAM DUE TO RH NEGATIVE MOTHER: Primary | ICD-10-CM

## 2022-08-30 ENCOUNTER — PRENATAL OFFICE VISIT (OUTPATIENT)
Dept: OBGYN | Facility: CLINIC | Age: 32
End: 2022-08-30
Payer: COMMERCIAL

## 2022-08-30 ENCOUNTER — LAB (OUTPATIENT)
Dept: LAB | Facility: CLINIC | Age: 32
End: 2022-08-30

## 2022-08-30 VITALS
SYSTOLIC BLOOD PRESSURE: 105 MMHG | DIASTOLIC BLOOD PRESSURE: 64 MMHG | WEIGHT: 145.6 LBS | BODY MASS INDEX: 23.4 KG/M2 | HEIGHT: 66 IN

## 2022-08-30 DIAGNOSIS — O09.92 SUPERVISION OF HIGH RISK PREGNANCY IN SECOND TRIMESTER: Primary | ICD-10-CM

## 2022-08-30 DIAGNOSIS — Z86.59 HISTORY OF EATING DISORDER: ICD-10-CM

## 2022-08-30 DIAGNOSIS — O99.340 DEPRESSIVE DISORDER IN MOTHER AFFECTING PREGNANCY: ICD-10-CM

## 2022-08-30 DIAGNOSIS — O09.92 SUPERVISION OF HIGH RISK PREGNANCY IN SECOND TRIMESTER: ICD-10-CM

## 2022-08-30 DIAGNOSIS — Z29.13 NEED FOR RHOGAM DUE TO RH NEGATIVE MOTHER: ICD-10-CM

## 2022-08-30 DIAGNOSIS — Z36.9 ENCOUNTER FOR ANTENATAL SCREENING OF MOTHER: ICD-10-CM

## 2022-08-30 DIAGNOSIS — F32.A DEPRESSIVE DISORDER IN MOTHER AFFECTING PREGNANCY: ICD-10-CM

## 2022-08-30 LAB
ANTIBODY SCREEN: NEGATIVE
ERYTHROCYTE [DISTWIDTH] IN BLOOD BY AUTOMATED COUNT: 12 % (ref 10–15)
GLUCOSE 1H P 50 G GLC PO SERPL-MCNC: 128 MG/DL (ref 70–129)
HCT VFR BLD AUTO: 32.8 % (ref 35–47)
HGB BLD-MCNC: 10.7 G/DL (ref 11.7–15.7)
MCH RBC QN AUTO: 31.6 PG (ref 26.5–33)
MCHC RBC AUTO-ENTMCNC: 32.6 G/DL (ref 31.5–36.5)
MCV RBC AUTO: 97 FL (ref 78–100)
PLATELET # BLD AUTO: 261 10E3/UL (ref 150–450)
RBC # BLD AUTO: 3.39 10E6/UL (ref 3.8–5.2)
SPECIMEN EXPIRATION DATE: NORMAL
WBC # BLD AUTO: 9.2 10E3/UL (ref 4–11)

## 2022-08-30 PROCEDURE — 99207 PR PRENATAL VISIT: CPT | Performed by: OBSTETRICS & GYNECOLOGY

## 2022-08-30 PROCEDURE — 86850 RBC ANTIBODY SCREEN: CPT

## 2022-08-30 PROCEDURE — 85027 COMPLETE CBC AUTOMATED: CPT

## 2022-08-30 PROCEDURE — 36415 COLL VENOUS BLD VENIPUNCTURE: CPT

## 2022-08-30 PROCEDURE — 82950 GLUCOSE TEST: CPT

## 2022-08-30 NOTE — PROGRESS NOTES
OB Visit @ 24w5d     No loss of fluid/vaginal bleeding/regular contractions. + FM  Has not made an appt with Psych. Has been feeling better        ICD-10-CM    1. Supervision of high risk pregnancy in second trimester  O09.92    2. History of eating disorder  Z86.59    3. Depressive disorder in mother affecting pregnancy  O99.340     F32.9        -glucola/cbc today  Discussed vaccinations  - Labor precautions. F/U 4wk    Zee Bautista Masters, DO

## 2022-09-03 ENCOUNTER — HEALTH MAINTENANCE LETTER (OUTPATIENT)
Age: 32
End: 2022-09-03

## 2022-09-28 ENCOUNTER — PRENATAL OFFICE VISIT (OUTPATIENT)
Dept: OBGYN | Facility: CLINIC | Age: 32
End: 2022-09-28
Payer: COMMERCIAL

## 2022-09-28 VITALS — SYSTOLIC BLOOD PRESSURE: 118 MMHG | DIASTOLIC BLOOD PRESSURE: 64 MMHG | BODY MASS INDEX: 24.53 KG/M2 | WEIGHT: 152 LBS

## 2022-09-28 DIAGNOSIS — O99.340 ANXIETY IN PREGNANCY, ANTEPARTUM: ICD-10-CM

## 2022-09-28 DIAGNOSIS — Z29.13 NEED FOR RHOGAM DUE TO RH NEGATIVE MOTHER: ICD-10-CM

## 2022-09-28 DIAGNOSIS — Z23 NEED FOR PROPHYLACTIC VACCINATION AND INOCULATION AGAINST INFLUENZA: ICD-10-CM

## 2022-09-28 DIAGNOSIS — O99.340 DEPRESSIVE DISORDER IN MOTHER AFFECTING PREGNANCY: ICD-10-CM

## 2022-09-28 DIAGNOSIS — O09.93 SUPERVISION OF HIGH RISK PREGNANCY IN THIRD TRIMESTER: Primary | ICD-10-CM

## 2022-09-28 DIAGNOSIS — F41.9 ANXIETY IN PREGNANCY, ANTEPARTUM: ICD-10-CM

## 2022-09-28 DIAGNOSIS — Z23 NEED FOR TDAP VACCINATION: ICD-10-CM

## 2022-09-28 DIAGNOSIS — F32.A DEPRESSIVE DISORDER IN MOTHER AFFECTING PREGNANCY: ICD-10-CM

## 2022-09-28 PROCEDURE — 99207 PR PRENATAL VISIT: CPT | Performed by: OBSTETRICS & GYNECOLOGY

## 2022-09-28 PROCEDURE — 90471 IMMUNIZATION ADMIN: CPT | Performed by: OBSTETRICS & GYNECOLOGY

## 2022-09-28 PROCEDURE — 96372 THER/PROPH/DIAG INJ SC/IM: CPT | Performed by: OBSTETRICS & GYNECOLOGY

## 2022-09-28 PROCEDURE — 90686 IIV4 VACC NO PRSV 0.5 ML IM: CPT | Performed by: OBSTETRICS & GYNECOLOGY

## 2022-09-28 NOTE — PROGRESS NOTES
OB Visit @ 28w6d     No loss of fluid/vaginal bleeding/regular contractions. + FM  Starting to get more uncomfortable as time goes on.   Works for [employer], on her feet and moving/traveling around, lifts [employer] as well. Has made some transition to more computer work.         ICD-10-CM    1. Supervision of high risk pregnancy in third trimester  O09.93    2. Depressive disorder in mother affecting pregnancy  O99.340     F32.9    3. Anxiety in pregnancy, antepartum  O99.340     F41.9    4. Need for rhogam due to Rh negative mother  Z29.13        -Rh negative. Rhogam today  -Discussed TDAP, Flu and COVID. Would like to break up. Will accept flu today.  - Labor precautions. F/U 2wk    Zee Bautista Masters, DO

## 2022-09-28 NOTE — PATIENT INSTRUCTIONS
PEDIATRICIANS  -[facility]-family practitioners that sees pediatrics.  -[facility]-pediatricians, Dr. Eddy, Dr. Desir.  Can do circumcisions here as well.  -[facility]-Dr. Rios, family practitioner that sees pediatrics    -[facility], [facility], [facility] (Dr Allen, Dr. Porter)-check insurance coverage as these may be outside of network       LABOR AND DELIVERY AT [facility]  -To get to the Labor and Delivery, or Maternal Assessment Center at [facility], enter the hospital through Door 6 on East end 7:00am to 7:00pm.  After this time, 7:00pm to 7:00am go through door #4 (ER entrance). This is labeled Birthplace entrance (red sign). There will be a screening station just inside.  Go straight ahead to the elevators, up to 2nd floor, then go left to Maternal Assessment Center.

## 2022-10-10 ENCOUNTER — PRENATAL OFFICE VISIT (OUTPATIENT)
Dept: OBGYN | Facility: CLINIC | Age: 32
End: 2022-10-10
Payer: COMMERCIAL

## 2022-10-10 VITALS — WEIGHT: 156.2 LBS | SYSTOLIC BLOOD PRESSURE: 110 MMHG | BODY MASS INDEX: 25.21 KG/M2 | DIASTOLIC BLOOD PRESSURE: 64 MMHG

## 2022-10-10 DIAGNOSIS — O09.93 SUPERVISION OF HIGH RISK PREGNANCY IN THIRD TRIMESTER: Primary | ICD-10-CM

## 2022-10-10 DIAGNOSIS — S39.012A BACK STRAIN, INITIAL ENCOUNTER: ICD-10-CM

## 2022-10-10 DIAGNOSIS — F41.9 ANXIETY IN PREGNANCY, ANTEPARTUM: ICD-10-CM

## 2022-10-10 DIAGNOSIS — O99.340 ANXIETY IN PREGNANCY, ANTEPARTUM: ICD-10-CM

## 2022-10-10 DIAGNOSIS — Z23 NEED FOR PROPHYLACTIC VACCINATION WITH COMBINED DIPHTHERIA-TETANUS-PERTUSSIS (DTP) VACCINE: ICD-10-CM

## 2022-10-10 PROCEDURE — 99207 PR PRENATAL VISIT: CPT | Performed by: OBSTETRICS & GYNECOLOGY

## 2022-10-10 PROCEDURE — 90715 TDAP VACCINE 7 YRS/> IM: CPT | Performed by: OBSTETRICS & GYNECOLOGY

## 2022-10-10 PROCEDURE — 90471 IMMUNIZATION ADMIN: CPT | Performed by: OBSTETRICS & GYNECOLOGY

## 2022-10-10 NOTE — PROGRESS NOTES
OB Visit @ 30w4d     No loss of fluid/vaginal bleeding/regular contractions. + FM          ICD-10-CM    1. Supervision of high risk pregnancy in third trimester  O09.93       2. Need for prophylactic vaccination with combined diphtheria-tetanus-pertussis (DTP) vaccine  Z23 TDAP VACCINE (Adacel, Boostrix)  [[id number]]      3. Anxiety in pregnancy, antepartum  O99.340     F41.9       4. Back strain, initial encounter  S39.012A Neck/Shoulder/Back/Abdomen Order for DME - ONLY FOR DME          -Influenza vaccine today  - Labor precautions. F/U 2wk    Zee Bautista Masters, DO

## 2022-10-24 ENCOUNTER — PRENATAL OFFICE VISIT (OUTPATIENT)
Dept: OBGYN | Facility: CLINIC | Age: 32
End: 2022-10-24
Payer: COMMERCIAL

## 2022-10-24 VITALS — DIASTOLIC BLOOD PRESSURE: 78 MMHG | SYSTOLIC BLOOD PRESSURE: 110 MMHG | BODY MASS INDEX: 26.05 KG/M2 | WEIGHT: 161.4 LBS

## 2022-10-24 DIAGNOSIS — F41.9 ANXIETY IN PREGNANCY, ANTEPARTUM: ICD-10-CM

## 2022-10-24 DIAGNOSIS — O99.340 ANXIETY IN PREGNANCY, ANTEPARTUM: ICD-10-CM

## 2022-10-24 DIAGNOSIS — O36.8390 FETAL TACHYCARDIA AFFECTING MANAGEMENT OF MOTHER: Primary | ICD-10-CM

## 2022-10-24 DIAGNOSIS — O09.93 SUPERVISION OF HIGH RISK PREGNANCY IN THIRD TRIMESTER: ICD-10-CM

## 2022-10-24 PROCEDURE — 59025 FETAL NON-STRESS TEST: CPT | Performed by: OBSTETRICS & GYNECOLOGY

## 2022-10-24 PROCEDURE — 99207 PR PRENATAL VISIT: CPT | Performed by: OBSTETRICS & GYNECOLOGY

## 2022-10-24 NOTE — PROGRESS NOTES
Patient here for NST per order from Dr. Brooks  External monitors placed after explanation and consent from patient about the procedure.  Denies contractions or cramping, LOF or VB.  Reports active FM    NST INTERPRETATION    Baseline Rate: 150  Variability: mod  Accelerations: present  Decelerations: absent  Reactivity confirmed after strip reviewed by Dr. Brooks    Pt discharged home without incident with instructions to call with any concerns or sx's of labor or DFM.  Pt verbalized understanding, in agreement with plan, and voiced no further questions.    Elysia Moser RN on 10/24/2022 at 11:10 AM

## 2022-10-24 NOTE — PROGRESS NOTES
OB Visit @ 32w4d     No loss of fluid/vaginal bleeding/regular contractions. + FM  Not sleeping well.   Building a house, on standstill. In apt until end of Nov.    able to come back with ill father in law, he is over at [facility].     Is wondering about primary c/s. Has been thinking about it since beginning of the pregnancy. Wasn't sure this was something she could choose        ICD-10-CM    1. Supervision of high risk pregnancy in third trimester  O09.93       2. Fetal tachycardia affecting management of mother  O36.8390 FETAL NON-STRESS TEST      3. Anxiety in pregnancy, antepartum  O99.340     F41.9           -Fetal tachycardia. 160s-170, fetal movement frequent. Will get NST  -anxiety in pregnancy.   Has good support and insight. Cont to monitor  No red flags.   -Insomnia. Rec unisom. This will also help with mood.  -Discussed options of primary c/s. Risks and recovery associated. Will cont to discuss at upcoming visits. Has discussed with her   - Labor precautions. F/U 2wk    Zee Bautista Masters, DO      Addendum:  NST reviewed. 150 reactive. Irritability, no palpable contractions.     Completed review of PMH, SocHx, SurHx, FHx, medications, and care everywhere reviewed. Epic updated.

## 2022-11-07 ENCOUNTER — PRENATAL OFFICE VISIT (OUTPATIENT)
Dept: OBGYN | Facility: CLINIC | Age: 32
End: 2022-11-07
Payer: COMMERCIAL

## 2022-11-07 VITALS — SYSTOLIC BLOOD PRESSURE: 110 MMHG | DIASTOLIC BLOOD PRESSURE: 56 MMHG | WEIGHT: 167 LBS | BODY MASS INDEX: 26.95 KG/M2

## 2022-11-07 DIAGNOSIS — O99.340 ANXIETY IN PREGNANCY, ANTEPARTUM: ICD-10-CM

## 2022-11-07 DIAGNOSIS — O09.93 SUPERVISION OF HIGH RISK PREGNANCY IN THIRD TRIMESTER: Primary | ICD-10-CM

## 2022-11-07 DIAGNOSIS — F41.9 ANXIETY IN PREGNANCY, ANTEPARTUM: ICD-10-CM

## 2022-11-07 PROCEDURE — 99207 PR PRENATAL VISIT: CPT | Performed by: OBSTETRICS & GYNECOLOGY

## 2022-11-07 NOTE — PROGRESS NOTES
OB Visit @ 34w4d     No loss of fluid/vaginal bleeding/regular contractions. + FM  Started using unisom, not helped much. COuld take it when she was nauseated and could tolerate it in the day.     Has thought about scheduled C/S, talked about it with Miles.   THinking about leaving her job. Has been there for 5yrs    Got an appointment with her Psychiatrist for early Ashok. Has done therapy with her as well.         ICD-10-CM    1. Supervision of high risk pregnancy in third trimester  O09.93       2. Anxiety in pregnancy, antepartum  O99.340     F41.9           -Insomnia. Will try benadryl  -Anxiety. On cymbalta. Has psychiatrist. Discussed possibly getting therapist in the mean time  - Labor precautions. F/U 2wk    Zee Bautista Masters, DO

## 2022-11-21 ENCOUNTER — PRENATAL OFFICE VISIT (OUTPATIENT)
Dept: OBGYN | Facility: CLINIC | Age: 32
End: 2022-11-21
Payer: COMMERCIAL

## 2022-11-21 VITALS — BODY MASS INDEX: 28.47 KG/M2 | DIASTOLIC BLOOD PRESSURE: 62 MMHG | SYSTOLIC BLOOD PRESSURE: 112 MMHG | WEIGHT: 176.4 LBS

## 2022-11-21 DIAGNOSIS — O09.93 SUPERVISION OF HIGH RISK PREGNANCY IN THIRD TRIMESTER: Primary | ICD-10-CM

## 2022-11-21 DIAGNOSIS — F32.A DEPRESSIVE DISORDER IN MOTHER AFFECTING PREGNANCY: ICD-10-CM

## 2022-11-21 DIAGNOSIS — O99.340 DEPRESSIVE DISORDER IN MOTHER AFFECTING PREGNANCY: ICD-10-CM

## 2022-11-21 DIAGNOSIS — O99.340 ANXIETY IN PREGNANCY, ANTEPARTUM: ICD-10-CM

## 2022-11-21 DIAGNOSIS — F41.9 ANXIETY IN PREGNANCY, ANTEPARTUM: ICD-10-CM

## 2022-11-21 PROCEDURE — 99207 PR PRENATAL VISIT: CPT | Performed by: OBSTETRICS & GYNECOLOGY

## 2022-11-21 PROCEDURE — 87653 STREP B DNA AMP PROBE: CPT | Performed by: OBSTETRICS & GYNECOLOGY

## 2022-11-21 NOTE — PROGRESS NOTES
OB Visit @ 36w4d     No loss of fluid/vaginal bleeding/regular contractions. + FM    Gave her two weeks notice. Feels so much lighter. Feels confident about the decision.   FEels like she is swelling. Just ordered compression stockings.  Will be moving out of their apt next Wednesday, move into her mom's apt for maybe a week.   Still thinking about c/s or vaginal delivery        ICD-10-CM    1. Supervision of high risk pregnancy in third trimester  O09.93 Group B strep PCR          -GBS collected  -Hx depression/anxiety. Doing well overall on cymbalta  - Labor precautions. F/U 1wk    Zee Bautista Masters, DO

## 2022-11-22 LAB — GP B STREP DNA SPEC QL NAA+PROBE: POSITIVE

## 2022-11-30 ENCOUNTER — HOSPITAL ENCOUNTER (INPATIENT)
Facility: CLINIC | Age: 32
LOS: 2 days | Discharge: HOME | End: 2022-12-02
Attending: OBSTETRICS & GYNECOLOGY | Admitting: OBSTETRICS & GYNECOLOGY
Payer: COMMERCIAL

## 2022-11-30 ENCOUNTER — ANESTHESIA (OUTPATIENT)
Dept: OBGYN | Facility: CLINIC | Age: 32
End: 2022-11-30
Payer: COMMERCIAL

## 2022-11-30 ENCOUNTER — PRENATAL OFFICE VISIT (OUTPATIENT)
Dept: OBGYN | Facility: CLINIC | Age: 32
End: 2022-11-30
Payer: COMMERCIAL

## 2022-11-30 ENCOUNTER — ANESTHESIA EVENT (OUTPATIENT)
Dept: OBGYN | Facility: CLINIC | Age: 32
End: 2022-11-30
Payer: COMMERCIAL

## 2022-11-30 VITALS — SYSTOLIC BLOOD PRESSURE: 132 MMHG | DIASTOLIC BLOOD PRESSURE: 84 MMHG | WEIGHT: 182.6 LBS | BODY MASS INDEX: 29.47 KG/M2

## 2022-11-30 DIAGNOSIS — D62 ANEMIA DUE TO ACUTE BLOOD LOSS: ICD-10-CM

## 2022-11-30 DIAGNOSIS — O99.340 ANXIETY IN PREGNANCY, ANTEPARTUM: ICD-10-CM

## 2022-11-30 DIAGNOSIS — O09.93 SUPERVISION OF HIGH RISK PREGNANCY IN THIRD TRIMESTER: Primary | ICD-10-CM

## 2022-11-30 DIAGNOSIS — F41.9 ANXIETY IN PREGNANCY, ANTEPARTUM: ICD-10-CM

## 2022-11-30 LAB
ABO/RH(D): NORMAL
ANTIBODY SCREEN: NEGATIVE
ERYTHROCYTE [DISTWIDTH] IN BLOOD BY AUTOMATED COUNT: 12 % (ref 10–15)
HCT VFR BLD AUTO: 34.5 % (ref 35–47)
HGB BLD-MCNC: 11.3 G/DL (ref 11.7–15.7)
MCH RBC QN AUTO: 29.6 PG (ref 26.5–33)
MCHC RBC AUTO-ENTMCNC: 32.8 G/DL (ref 31.5–36.5)
MCV RBC AUTO: 90 FL (ref 78–100)
PLATELET # BLD AUTO: 287 10E3/UL (ref 150–450)
RBC # BLD AUTO: 3.82 10E6/UL (ref 3.8–5.2)
SARS-COV-2 RNA RESP QL NAA+PROBE: NEGATIVE
SPECIMEN EXPIRATION DATE: NORMAL
WBC # BLD AUTO: 16.2 10E3/UL (ref 4–11)

## 2022-11-30 PROCEDURE — 250N000011 HC RX IP 250 OP 636: Performed by: ANESTHESIOLOGY

## 2022-11-30 PROCEDURE — 120N000001 HC R&B MED SURG/OB

## 2022-11-30 PROCEDURE — 250N000011 HC RX IP 250 OP 636: Performed by: OBSTETRICS & GYNECOLOGY

## 2022-11-30 PROCEDURE — 86780 TREPONEMA PALLIDUM: CPT | Performed by: OBSTETRICS & GYNECOLOGY

## 2022-11-30 PROCEDURE — 86850 RBC ANTIBODY SCREEN: CPT | Performed by: OBSTETRICS & GYNECOLOGY

## 2022-11-30 PROCEDURE — 85014 HEMATOCRIT: CPT | Performed by: OBSTETRICS & GYNECOLOGY

## 2022-11-30 PROCEDURE — 86901 BLOOD TYPING SEROLOGIC RH(D): CPT | Performed by: OBSTETRICS & GYNECOLOGY

## 2022-11-30 PROCEDURE — 99207 PR PRENATAL VISIT: CPT | Performed by: OBSTETRICS & GYNECOLOGY

## 2022-11-30 PROCEDURE — 370N000003 HC ANESTHESIA WARD SERVICE

## 2022-11-30 PROCEDURE — 3E0R3BZ INTRODUCTION OF ANESTHETIC AGENT INTO SPINAL CANAL, PERCUTANEOUS APPROACH: ICD-10-PCS | Performed by: ANESTHESIOLOGY

## 2022-11-30 PROCEDURE — G0463 HOSPITAL OUTPT CLINIC VISIT: HCPCS | Mod: 25

## 2022-11-30 PROCEDURE — U0005 INFEC AGEN DETEC AMPLI PROBE: HCPCS | Performed by: OBSTETRICS & GYNECOLOGY

## 2022-11-30 PROCEDURE — 59025 FETAL NON-STRESS TEST: CPT

## 2022-11-30 PROCEDURE — 00HU33Z INSERTION OF INFUSION DEVICE INTO SPINAL CANAL, PERCUTANEOUS APPROACH: ICD-10-PCS | Performed by: ANESTHESIOLOGY

## 2022-11-30 PROCEDURE — 258N000003 HC RX IP 258 OP 636: Performed by: OBSTETRICS & GYNECOLOGY

## 2022-11-30 RX ORDER — TERBUTALINE SULFATE 1 MG/ML
0.25 INJECTION, SOLUTION SUBCUTANEOUS
Status: DISCONTINUED | OUTPATIENT
Start: 2022-11-30 | End: 2022-12-02 | Stop reason: HOSPADM

## 2022-11-30 RX ORDER — NALOXONE HYDROCHLORIDE 0.4 MG/ML
0.2 INJECTION, SOLUTION INTRAMUSCULAR; INTRAVENOUS; SUBCUTANEOUS
Status: DISCONTINUED | OUTPATIENT
Start: 2022-11-30 | End: 2022-12-02

## 2022-11-30 RX ORDER — OXYTOCIN 10 [USP'U]/ML
10 INJECTION, SOLUTION INTRAMUSCULAR; INTRAVENOUS
Status: DISCONTINUED | OUTPATIENT
Start: 2022-11-30 | End: 2022-12-02

## 2022-11-30 RX ORDER — LIDOCAINE 40 MG/G
CREAM TOPICAL
Status: DISCONTINUED | OUTPATIENT
Start: 2022-11-30 | End: 2022-12-02 | Stop reason: HOSPADM

## 2022-11-30 RX ORDER — ACETAMINOPHEN 325 MG/1
650 TABLET ORAL EVERY 4 HOURS PRN
Status: DISCONTINUED | OUTPATIENT
Start: 2022-11-30 | End: 2022-12-02

## 2022-11-30 RX ORDER — OXYTOCIN/0.9 % SODIUM CHLORIDE 30/500 ML
1-24 PLASTIC BAG, INJECTION (ML) INTRAVENOUS CONTINUOUS PRN
Status: DISCONTINUED | OUTPATIENT
Start: 2022-11-30 | End: 2022-12-02 | Stop reason: HOSPADM

## 2022-11-30 RX ORDER — ONDANSETRON 2 MG/ML
4 INJECTION INTRAMUSCULAR; INTRAVENOUS EVERY 6 HOURS PRN
Status: DISCONTINUED | OUTPATIENT
Start: 2022-11-30 | End: 2022-11-30 | Stop reason: HOSPADM

## 2022-11-30 RX ORDER — METOCLOPRAMIDE HYDROCHLORIDE 5 MG/ML
10 INJECTION INTRAMUSCULAR; INTRAVENOUS EVERY 6 HOURS PRN
Status: DISCONTINUED | OUTPATIENT
Start: 2022-11-30 | End: 2022-12-02

## 2022-11-30 RX ORDER — NALOXONE HYDROCHLORIDE 0.4 MG/ML
0.4 INJECTION, SOLUTION INTRAMUSCULAR; INTRAVENOUS; SUBCUTANEOUS
Status: DISCONTINUED | OUTPATIENT
Start: 2022-11-30 | End: 2022-12-02

## 2022-11-30 RX ORDER — OXYTOCIN/0.9 % SODIUM CHLORIDE 30/500 ML
340 PLASTIC BAG, INJECTION (ML) INTRAVENOUS CONTINUOUS PRN
Status: DISCONTINUED | OUTPATIENT
Start: 2022-11-30 | End: 2022-12-02

## 2022-11-30 RX ORDER — FENTANYL CITRATE 50 UG/ML
INJECTION, SOLUTION INTRAMUSCULAR; INTRAVENOUS
Status: COMPLETED | OUTPATIENT
Start: 2022-11-30 | End: 2022-11-30

## 2022-11-30 RX ORDER — ONDANSETRON 2 MG/ML
4 INJECTION INTRAMUSCULAR; INTRAVENOUS EVERY 6 HOURS PRN
Status: DISCONTINUED | OUTPATIENT
Start: 2022-11-30 | End: 2022-12-02

## 2022-11-30 RX ORDER — PROCHLORPERAZINE 25 MG
25 SUPPOSITORY, RECTAL RECTAL EVERY 12 HOURS PRN
Status: DISCONTINUED | OUTPATIENT
Start: 2022-11-30 | End: 2022-11-30 | Stop reason: HOSPADM

## 2022-11-30 RX ORDER — METOCLOPRAMIDE 10 MG/1
10 TABLET ORAL EVERY 6 HOURS PRN
Status: DISCONTINUED | OUTPATIENT
Start: 2022-11-30 | End: 2022-12-02

## 2022-11-30 RX ORDER — TRANEXAMIC ACID 10 MG/ML
1 INJECTION, SOLUTION INTRAVENOUS EVERY 30 MIN PRN
Status: DISCONTINUED | OUTPATIENT
Start: 2022-11-30 | End: 2022-12-02

## 2022-11-30 RX ORDER — SODIUM CHLORIDE, SODIUM LACTATE, POTASSIUM CHLORIDE, CALCIUM CHLORIDE 600; 310; 30; 20 MG/100ML; MG/100ML; MG/100ML; MG/100ML
INJECTION, SOLUTION INTRAVENOUS CONTINUOUS
Status: DISCONTINUED | OUTPATIENT
Start: 2022-11-30 | End: 2022-12-02

## 2022-11-30 RX ORDER — FENTANYL CITRATE 50 UG/ML
100 INJECTION, SOLUTION INTRAMUSCULAR; INTRAVENOUS
Status: DISCONTINUED | OUTPATIENT
Start: 2022-11-30 | End: 2022-12-02

## 2022-11-30 RX ORDER — IBUPROFEN 400 MG/1
800 TABLET, FILM COATED ORAL
Status: DISCONTINUED | OUTPATIENT
Start: 2022-11-30 | End: 2022-12-02

## 2022-11-30 RX ORDER — ONDANSETRON 4 MG/1
4 TABLET, ORALLY DISINTEGRATING ORAL EVERY 6 HOURS PRN
Status: DISCONTINUED | OUTPATIENT
Start: 2022-11-30 | End: 2022-12-02 | Stop reason: HOSPADM

## 2022-11-30 RX ORDER — METOCLOPRAMIDE 10 MG/1
10 TABLET ORAL EVERY 6 HOURS PRN
Status: DISCONTINUED | OUTPATIENT
Start: 2022-11-30 | End: 2022-11-30 | Stop reason: HOSPADM

## 2022-11-30 RX ORDER — ONDANSETRON 4 MG/1
4 TABLET, ORALLY DISINTEGRATING ORAL EVERY 6 HOURS PRN
Status: DISCONTINUED | OUTPATIENT
Start: 2022-11-30 | End: 2022-12-02

## 2022-11-30 RX ORDER — KETOROLAC TROMETHAMINE 30 MG/ML
30 INJECTION, SOLUTION INTRAMUSCULAR; INTRAVENOUS
Status: DISCONTINUED | OUTPATIENT
Start: 2022-11-30 | End: 2022-12-02

## 2022-11-30 RX ORDER — ONDANSETRON 2 MG/ML
4 INJECTION INTRAMUSCULAR; INTRAVENOUS EVERY 6 HOURS PRN
Status: DISCONTINUED | OUTPATIENT
Start: 2022-11-30 | End: 2022-12-02 | Stop reason: HOSPADM

## 2022-11-30 RX ORDER — PENICILLIN G 3000000 [IU]/50ML
3 INJECTION, SOLUTION INTRAVENOUS EVERY 4 HOURS
Status: DISCONTINUED | OUTPATIENT
Start: 2022-12-01 | End: 2022-12-02

## 2022-11-30 RX ORDER — PROCHLORPERAZINE MALEATE 10 MG
10 TABLET ORAL EVERY 6 HOURS PRN
Status: DISCONTINUED | OUTPATIENT
Start: 2022-11-30 | End: 2022-12-02

## 2022-11-30 RX ORDER — METOCLOPRAMIDE HYDROCHLORIDE 5 MG/ML
10 INJECTION INTRAMUSCULAR; INTRAVENOUS EVERY 6 HOURS PRN
Status: DISCONTINUED | OUTPATIENT
Start: 2022-11-30 | End: 2022-11-30 | Stop reason: HOSPADM

## 2022-11-30 RX ORDER — OXYTOCIN/0.9 % SODIUM CHLORIDE 30/500 ML
100-340 PLASTIC BAG, INJECTION (ML) INTRAVENOUS CONTINUOUS PRN
Status: DISCONTINUED | OUTPATIENT
Start: 2022-11-30 | End: 2022-12-02

## 2022-11-30 RX ORDER — MISOPROSTOL 200 UG/1
400 TABLET ORAL
Status: DISCONTINUED | OUTPATIENT
Start: 2022-11-30 | End: 2022-12-02

## 2022-11-30 RX ORDER — METHYLERGONOVINE MALEATE 0.2 MG/ML
200 INJECTION INTRAVENOUS
Status: DISCONTINUED | OUTPATIENT
Start: 2022-11-30 | End: 2022-12-02

## 2022-11-30 RX ORDER — NALBUPHINE HYDROCHLORIDE 10 MG/ML
2.5-5 INJECTION, SOLUTION INTRAMUSCULAR; INTRAVENOUS; SUBCUTANEOUS EVERY 6 HOURS PRN
Status: DISCONTINUED | OUTPATIENT
Start: 2022-11-30 | End: 2022-12-02 | Stop reason: HOSPADM

## 2022-11-30 RX ORDER — CARBOPROST TROMETHAMINE 250 UG/ML
250 INJECTION, SOLUTION INTRAMUSCULAR
Status: DISCONTINUED | OUTPATIENT
Start: 2022-11-30 | End: 2022-12-02

## 2022-11-30 RX ORDER — PROCHLORPERAZINE MALEATE 5 MG
10 TABLET ORAL EVERY 6 HOURS PRN
Status: DISCONTINUED | OUTPATIENT
Start: 2022-11-30 | End: 2022-11-30 | Stop reason: HOSPADM

## 2022-11-30 RX ORDER — SODIUM CHLORIDE, SODIUM LACTATE, POTASSIUM CHLORIDE, CALCIUM CHLORIDE 600; 310; 30; 20 MG/100ML; MG/100ML; MG/100ML; MG/100ML
INJECTION, SOLUTION INTRAVENOUS CONTINUOUS PRN
Status: DISCONTINUED | OUTPATIENT
Start: 2022-11-30 | End: 2022-12-02 | Stop reason: HOSPADM

## 2022-11-30 RX ORDER — ROPIVACAINE HYDROCHLORIDE 2 MG/ML
10 INJECTION, SOLUTION EPIDURAL; INFILTRATION; PERINEURAL ONCE
Status: DISCONTINUED | OUTPATIENT
Start: 2022-11-30 | End: 2022-12-02 | Stop reason: HOSPADM

## 2022-11-30 RX ORDER — PENICILLIN G POTASSIUM 5000000 [IU]/1
5 INJECTION, POWDER, FOR SOLUTION INTRAMUSCULAR; INTRAVENOUS ONCE
Status: COMPLETED | OUTPATIENT
Start: 2022-11-30 | End: 2022-11-30

## 2022-11-30 RX ORDER — PROCHLORPERAZINE 25 MG
25 SUPPOSITORY, RECTAL RECTAL EVERY 12 HOURS PRN
Status: DISCONTINUED | OUTPATIENT
Start: 2022-11-30 | End: 2022-12-02

## 2022-11-30 RX ORDER — MISOPROSTOL 200 UG/1
800 TABLET ORAL
Status: DISCONTINUED | OUTPATIENT
Start: 2022-11-30 | End: 2022-12-02

## 2022-11-30 RX ORDER — EPHEDRINE SULFATE 50 MG/ML
5 INJECTION, SOLUTION INTRAMUSCULAR; INTRAVENOUS; SUBCUTANEOUS
Status: DISCONTINUED | OUTPATIENT
Start: 2022-11-30 | End: 2022-12-02 | Stop reason: HOSPADM

## 2022-11-30 RX ORDER — CITRIC ACID/SODIUM CITRATE 334-500MG
30 SOLUTION, ORAL ORAL
Status: DISCONTINUED | OUTPATIENT
Start: 2022-11-30 | End: 2022-12-02

## 2022-11-30 RX ORDER — LIDOCAINE 40 MG/G
CREAM TOPICAL
Status: DISCONTINUED | OUTPATIENT
Start: 2022-11-30 | End: 2022-12-02

## 2022-11-30 RX ORDER — ROPIVACAINE HYDROCHLORIDE 2 MG/ML
INJECTION, SOLUTION EPIDURAL; INFILTRATION; PERINEURAL
Status: COMPLETED | OUTPATIENT
Start: 2022-11-30 | End: 2022-11-30

## 2022-11-30 RX ORDER — ONDANSETRON 4 MG/1
4 TABLET, ORALLY DISINTEGRATING ORAL EVERY 6 HOURS PRN
Status: DISCONTINUED | OUTPATIENT
Start: 2022-11-30 | End: 2022-11-30 | Stop reason: HOSPADM

## 2022-11-30 RX ORDER — FENTANYL CITRATE 50 UG/ML
100 INJECTION, SOLUTION INTRAMUSCULAR; INTRAVENOUS ONCE
Status: DISCONTINUED | OUTPATIENT
Start: 2022-11-30 | End: 2022-12-02 | Stop reason: HOSPADM

## 2022-11-30 RX ADMIN — SODIUM CHLORIDE, POTASSIUM CHLORIDE, SODIUM LACTATE AND CALCIUM CHLORIDE: 600; 310; 30; 20 INJECTION, SOLUTION INTRAVENOUS at 22:41

## 2022-11-30 RX ADMIN — PENICILLIN G POTASSIUM 5 MILLION UNITS: 5000000 POWDER, FOR SOLUTION INTRAMUSCULAR; INTRAPLEURAL; INTRATHECAL; INTRAVENOUS at 21:33

## 2022-11-30 RX ADMIN — SODIUM CHLORIDE, POTASSIUM CHLORIDE, SODIUM LACTATE AND CALCIUM CHLORIDE: 600; 310; 30; 20 INJECTION, SOLUTION INTRAVENOUS at 21:32

## 2022-11-30 RX ADMIN — FENTANYL CITRATE 100 MCG: 50 INJECTION, SOLUTION INTRAMUSCULAR; INTRAVENOUS at 22:06

## 2022-11-30 RX ADMIN — ROPIVACAINE HYDROCHLORIDE 10 ML: 2 INJECTION, SOLUTION EPIDURAL; INFILTRATION at 22:06

## 2022-11-30 RX ADMIN — Medication 12 ML/HR: at 21:59

## 2022-11-30 ASSESSMENT — ACTIVITIES OF DAILY LIVING (ADL)
ADLS_ACUITY_SCORE: 18
ADLS_ACUITY_SCORE: 18

## 2022-11-30 NOTE — PROGRESS NOTES
OB Visit @ 37w6d     No loss of fluid/vaginal bleeding/regular contractions. + FM  Has been having some contractions. Lost her mucous plug. Ctx were 5min apart this am        ICD-10-CM    1. Supervision of high risk pregnancy in third trimester  O09.93       2. Anxiety in pregnancy, antepartum  O99.340     F41.9           -GBS pos, tx in labor  -SVE 2-3cm. Suspect she is in early labor. Precautions and when to go to hospital discussed. F/U 1wk if still undelivered    Zee Bautista Masters, DO

## 2022-12-01 LAB
ABO/RH(D): NORMAL
FETAL BLEED SCREEN: NORMAL
SPECIMEN EXPIRATION DATE: NORMAL
T PALLIDUM AB SER QL: NONREACTIVE

## 2022-12-01 PROCEDURE — 250N000011 HC RX IP 250 OP 636: Performed by: OBSTETRICS & GYNECOLOGY

## 2022-12-01 PROCEDURE — 59400 OBSTETRICAL CARE: CPT | Performed by: OBSTETRICS & GYNECOLOGY

## 2022-12-01 PROCEDURE — 250N000009 HC RX 250: Performed by: OBSTETRICS & GYNECOLOGY

## 2022-12-01 PROCEDURE — 250N000011 HC RX IP 250 OP 636: Performed by: ANESTHESIOLOGY

## 2022-12-01 PROCEDURE — 250N000013 HC RX MED GY IP 250 OP 250 PS 637: Performed by: OBSTETRICS & GYNECOLOGY

## 2022-12-01 PROCEDURE — 722N000001 HC LABOR CARE VAGINAL DELIVERY SINGLE

## 2022-12-01 PROCEDURE — 0KQM0ZZ REPAIR PERINEUM MUSCLE, OPEN APPROACH: ICD-10-PCS | Performed by: OBSTETRICS & GYNECOLOGY

## 2022-12-01 PROCEDURE — 120N000012 HC R&B POSTPARTUM

## 2022-12-01 RX ORDER — OXYTOCIN/0.9 % SODIUM CHLORIDE 30/500 ML
340 PLASTIC BAG, INJECTION (ML) INTRAVENOUS CONTINUOUS PRN
Status: DISCONTINUED | OUTPATIENT
Start: 2022-12-01 | End: 2022-12-02 | Stop reason: HOSPADM

## 2022-12-01 RX ORDER — MODIFIED LANOLIN
OINTMENT (GRAM) TOPICAL
Status: DISCONTINUED | OUTPATIENT
Start: 2022-12-01 | End: 2022-12-02 | Stop reason: HOSPADM

## 2022-12-01 RX ORDER — PRENATAL VIT/IRON FUM/FOLIC AC 27MG-0.8MG
TABLET ORAL DAILY
Status: DISCONTINUED | OUTPATIENT
Start: 2022-12-01 | End: 2022-12-02 | Stop reason: HOSPADM

## 2022-12-01 RX ORDER — IBUPROFEN 400 MG/1
800 TABLET, FILM COATED ORAL EVERY 6 HOURS PRN
Status: DISCONTINUED | OUTPATIENT
Start: 2022-12-01 | End: 2022-12-02 | Stop reason: HOSPADM

## 2022-12-01 RX ORDER — TRANEXAMIC ACID 10 MG/ML
1 INJECTION, SOLUTION INTRAVENOUS EVERY 30 MIN PRN
Status: DISCONTINUED | OUTPATIENT
Start: 2022-12-01 | End: 2022-12-02 | Stop reason: HOSPADM

## 2022-12-01 RX ORDER — CARBOPROST TROMETHAMINE 250 UG/ML
250 INJECTION, SOLUTION INTRAMUSCULAR
Status: DISCONTINUED | OUTPATIENT
Start: 2022-12-01 | End: 2022-12-02 | Stop reason: HOSPADM

## 2022-12-01 RX ORDER — OXYCODONE HYDROCHLORIDE 5 MG/1
5 TABLET ORAL EVERY 4 HOURS PRN
Status: DISCONTINUED | OUTPATIENT
Start: 2022-12-01 | End: 2022-12-02 | Stop reason: HOSPADM

## 2022-12-01 RX ORDER — BISACODYL 10 MG
10 SUPPOSITORY, RECTAL RECTAL DAILY PRN
Status: DISCONTINUED | OUTPATIENT
Start: 2022-12-01 | End: 2022-12-02 | Stop reason: HOSPADM

## 2022-12-01 RX ORDER — DOCUSATE SODIUM 100 MG/1
100 CAPSULE, LIQUID FILLED ORAL DAILY
Status: DISCONTINUED | OUTPATIENT
Start: 2022-12-01 | End: 2022-12-02 | Stop reason: HOSPADM

## 2022-12-01 RX ORDER — ACETAMINOPHEN 500 MG
500-1000 TABLET ORAL EVERY 6 HOURS PRN
Qty: 90 TABLET | Refills: 0 | Status: SHIPPED | OUTPATIENT
Start: 2022-12-01 | End: 2023-10-04

## 2022-12-01 RX ORDER — IBUPROFEN 800 MG/1
800 TABLET, FILM COATED ORAL EVERY 8 HOURS PRN
Qty: 90 TABLET | Refills: 0 | Status: SHIPPED | OUTPATIENT
Start: 2022-12-01 | End: 2023-10-04

## 2022-12-01 RX ORDER — DOCUSATE SODIUM 100 MG/1
100 CAPSULE, LIQUID FILLED ORAL 2 TIMES DAILY PRN
Qty: 60 CAPSULE | Refills: 0 | Status: SHIPPED | OUTPATIENT
Start: 2022-12-01 | End: 2023-10-04

## 2022-12-01 RX ORDER — OXYTOCIN 10 [USP'U]/ML
10 INJECTION, SOLUTION INTRAMUSCULAR; INTRAVENOUS
Status: DISCONTINUED | OUTPATIENT
Start: 2022-12-01 | End: 2022-12-02 | Stop reason: HOSPADM

## 2022-12-01 RX ORDER — HYDROCORTISONE 25 MG/G
CREAM TOPICAL 3 TIMES DAILY PRN
Status: DISCONTINUED | OUTPATIENT
Start: 2022-12-01 | End: 2022-12-02 | Stop reason: HOSPADM

## 2022-12-01 RX ORDER — ACETAMINOPHEN 325 MG/1
975 TABLET ORAL EVERY 6 HOURS PRN
Status: DISCONTINUED | OUTPATIENT
Start: 2022-12-01 | End: 2022-12-02 | Stop reason: HOSPADM

## 2022-12-01 RX ORDER — METHYLERGONOVINE MALEATE 0.2 MG/ML
200 INJECTION INTRAVENOUS
Status: DISCONTINUED | OUTPATIENT
Start: 2022-12-01 | End: 2022-12-02 | Stop reason: HOSPADM

## 2022-12-01 RX ORDER — DULOXETIN HYDROCHLORIDE 30 MG/1
60 CAPSULE, DELAYED RELEASE ORAL DAILY
Status: DISCONTINUED | OUTPATIENT
Start: 2022-12-01 | End: 2022-12-02 | Stop reason: HOSPADM

## 2022-12-01 RX ORDER — MISOPROSTOL 200 UG/1
400 TABLET ORAL
Status: DISCONTINUED | OUTPATIENT
Start: 2022-12-01 | End: 2022-12-02 | Stop reason: HOSPADM

## 2022-12-01 RX ORDER — MISOPROSTOL 200 UG/1
800 TABLET ORAL
Status: DISCONTINUED | OUTPATIENT
Start: 2022-12-01 | End: 2022-12-02 | Stop reason: HOSPADM

## 2022-12-01 RX ADMIN — Medication 12 ML/HR: at 04:47

## 2022-12-01 RX ADMIN — ACETAMINOPHEN 975 MG: 325 TABLET, FILM COATED ORAL at 14:50

## 2022-12-01 RX ADMIN — LIDOCAINE HYDROCHLORIDE 5 ML: 10 INJECTION, SOLUTION EPIDURAL; INFILTRATION; INTRACAUDAL; PERINEURAL at 06:59

## 2022-12-01 RX ADMIN — DOCUSATE SODIUM 100 MG: 100 CAPSULE, LIQUID FILLED ORAL at 09:09

## 2022-12-01 RX ADMIN — IBUPROFEN 800 MG: 400 TABLET, FILM COATED ORAL at 21:12

## 2022-12-01 RX ADMIN — PENICILLIN G 3 MILLION UNITS: 3000000 INJECTION, SOLUTION INTRAVENOUS at 05:05

## 2022-12-01 RX ADMIN — PRENATAL VITAMINS-IRON FUMARATE 27 MG IRON-FOLIC ACID 0.8 MG TABLET 1 TABLET: at 09:09

## 2022-12-01 RX ADMIN — DULOXETINE 60 MG: 60 CAPSULE, DELAYED RELEASE ORAL at 11:46

## 2022-12-01 RX ADMIN — Medication 340 ML/HR: at 06:52

## 2022-12-01 RX ADMIN — ACETAMINOPHEN 975 MG: 325 TABLET, FILM COATED ORAL at 09:09

## 2022-12-01 RX ADMIN — IBUPROFEN 800 MG: 400 TABLET, FILM COATED ORAL at 14:51

## 2022-12-01 RX ADMIN — PENICILLIN G 3 MILLION UNITS: 3000000 INJECTION, SOLUTION INTRAVENOUS at 01:06

## 2022-12-01 RX ADMIN — IBUPROFEN 800 MG: 400 TABLET, FILM COATED ORAL at 09:09

## 2022-12-01 RX ADMIN — ACETAMINOPHEN 975 MG: 325 TABLET, FILM COATED ORAL at 21:11

## 2022-12-01 ASSESSMENT — ACTIVITIES OF DAILY LIVING (ADL)
ADLS_ACUITY_SCORE: 18

## 2022-12-01 NOTE — PLAN OF CARE
Data: Saskia Ortega transferred to 428 via wheelchair at 0935. Baby transferred via parent's arms.  Action: Receiving unit notified of transfer: Yes. Patient and family notified of room change. Report given to Varsha AVALOS RN at 0943. Belongings sent to receiving unit. Accompanied by Registered Nurse. Oriented patient to surroundings. Call light within reach. ID bands double-checked with receiving RN.  Response: Patient tolerated transfer and is stable.

## 2022-12-01 NOTE — PROVIDER NOTIFICATION
12/01/22 0631   Provider Notification   Provider Name/Title Dr Reid   Method of Notification Phone   Request Attend Delivery   Notification Reason Other (Comment)   Baby +3 station, patient feeling constant pressure, involuntarily pushing, requested in house to stand by for delivery. Called for ETA, provider 15 min away.

## 2022-12-01 NOTE — PROVIDER NOTIFICATION
11/30/22 2037   Provider Notification   Provider Name/Title Dr. Reid   Method of Notification Phone   Request Evaluate - Remote   Notification Reason Patient Arrived;Decels;Labor Status;Uterine Activity;Pain;SVE;Status Update       Updated MD on but not limited to, patient, fetal, and uterine status, vital signs, patient request, and SVE. Orders received for admission.  May start pitocin augmentation if necessary after 2nd dose of PCN is given.  Provider aware of bp readings, routine labs for now.

## 2022-12-01 NOTE — PLAN OF CARE
Up ambulating in room , restroom.  Able top void well and independent with panfilo care.   Bonding well with baby girl, hand expression and breastfeed attempted, skin to skin done.

## 2022-12-01 NOTE — L&D DELIVERY NOTE
of viable female at 0650 officially delivered by Dr. Emani Varela with myself in attendance at time of delivery and immediately taking over cares once infant placed on maternal chest.   Baby's weight 6lbs. 14oz.     Apgars 7, 9     2nd degree lac repaired in normal fashion with 3-0 vicryl   cc    Primary OB: Masters

## 2022-12-01 NOTE — PROVIDER NOTIFICATION
12/01/22 0116   Provider Notification   Provider Name/Title Dr. Reid   Method of Notification Electronic Page   Request Evaluate - Remote   Notification Reason SVE;Status Update   FYI SVE 8-9/90/-1. Intact, bulging bag, no one avail for AROM. Penicillin x2. Epidural.

## 2022-12-01 NOTE — ANESTHESIA PROCEDURE NOTES
Epidural catheter Procedure Note    Pre-Procedure   Staff -        Anesthesiologist:  Dori Asencio MD       Performed By: anesthesiologist       Location: OB       Pre-Anesthestic Checklist: patient identified, risks and benefits discussed, informed consent, pre-op evaluation and at physician/surgeon's request  Timeout:       Correct Patient: Yes        Correct Procedure: Yes        Correct Site: Yes        Correct Position: Yes   Procedure Documentation  Procedure: epidural catheter       Diagnosis: labor pain       Patient Position: sitting       Patient Prep/Sterile Barriers: sterile gloves, mask, patient draped       Skin prep: Betadine       Local skin infiltrated with 3 mL of 1% lidocaine.        Insertion Site: L3-4. (midline approach).       Technique: LORT saline        Needle Type: Touhy needle       Needle Gauge: 17.        Needle Length (Inches): 3.5        Catheter: 19 G.          Catheter threaded easily.         3 cm epidural space.           # of attempts: 1 and  # of redirects:     Assessment/Narrative         Paresthesias: No.       Test dose of 3 mL lidocaine 1.5% w/ 1:200,000 epinephrine at.         Test dose negative, 3 minutes after injection, for signs of intravascular, subdural, or intrathecal injection.       Insertion/Infusion Method: LORT saline       Aspiration negative for Heme or CSF via Epidural Catheter.    Medication(s) Administered   0.2% Ropivacaine (Epidural) - EPIDURAL   10 mL - 11/30/2022 10:06:00 PM  Fentanyl PF (Epidural) - EPIDURAL   100 mcg - 11/30/2022 10:06:00 PM   Comments:  No blood or complications.  Secured with adhesive spray, tegaderm, and tape.    Orders to manage the epidural infusion have been entered and, through coordination with the nurse, we will continue to manage and monitor the patient's labor epidural. We will continuously be available to adjust as needed throughout the entire labor and delivery process.      FOR [facility] ([facility]) ONLY:   Pain Team  "Contact information: please page the Pain Team Via [facility]. Search \"Pain\". During daytime hours, please page the attending first. At night please page the resident first.    "

## 2022-12-01 NOTE — PLAN OF CARE
patient delivered viable female. Skin to skin, working on breastfeeding. Report given to Trinidad Paulson RN to [facility] recovery.

## 2022-12-01 NOTE — PLAN OF CARE
0500 Pt uncomfortable, feeling intense rectal pressure, SVE 10/100/0. Provider notified and pt and SO coached on pushing process.  0530 Pt positioned in stirrups and started pushing. Variable decels noted and audible at bedside.  0615 Good progress noted with pushing, FSE applied due to frequent loss of capture. Significant decels noted, provider notified, pt stopped pushing until provider could be at bedside.  0630 Pt appears to be involuntarily pushing, unable to breath well through ctx. Inhouse provider requested to come in and stand by until Dr Reid arrives. Dr CLINTON Varela at bedside at 0632.  0650 SVE of viable baby girl. Dr STEPH Varela and Dr Reid in the room for delivery. Delivery team present for delivery due to tones and Cymbalta. Second degree repaired by Dr Reid. See delivery summary for more information.

## 2022-12-01 NOTE — PLAN OF CARE
Pt. admitted from L&D  [facility] and transferred to bed with  Assist of one RN. Pt. arrived with baby and was accompanied by Miles CASTILLO and arrived with personal belongings. Report was taken from Trinidad in L&D. VS stable. Fundus is firm and midline.  Vaginal bleeding is scant.   Pt. oriented to the room and call light system.Able to void and up independently.

## 2022-12-01 NOTE — PROVIDER NOTIFICATION
12/01/22 0510   Provider Notification   Provider Name/Title Dr. Reid   Method of Notification Phone   Request Evaluate - Remote   Notification Reason SVE;Status Update   Updated MD pt is 10/100/0, will begin pushing.

## 2022-12-01 NOTE — L&D DELIVERY NOTE
Delivery Date: 2022    HISTORY OF PRESENT ILLNESS:  Saskia is a 32-year-old  2, para 0-0-1-0, whose pregnancy was followed by Dr. Zee Brooks.  The patient is blood type A negative.  She is rubella immune, and she is group B strep positive.  The patient did have NIPT testing done that was normal as well as a normal anatomy scan.  The patient does have a history of anxiety, depression and ADHD.  She was taking 60 mg of Cymbalta with excellent control of her symptoms throughout the pregnancy.  She did receive RhoGAM at 28 weeks for Rh negative status as well as a Tdap flu shot and passing her GCT.  The patient presented yesterday in clinic, at which time she was 37 weeks 6 days and was having moderate and regular contractions.  The patient was checked in the office and was found to be 2 to 3 cm, 70% effaced with a bulging bag of water.  At that point was debbie somewhat irregularly every 5 to 10 minutes.  This was at approximately 1:00 p.m.  The patient was then sent home and returned back at approximately 2000 and at that time was 4 to 5 cm and debbie every 1 to 4 minutes and admitted in active labor.    The patient upon admission, was started on penicillin for group B strep treatment and she did receive adequate treatment for that.  She did eventually receive an epidural for pain control and progressed normally through labor without any augmentation.  She had spontaneous rupture of membranes with clear fluid at 2:05 a.m., at which time she was 8 to 9 cm and then progressed to complete at approximately 5:10 a.m.  She began pushing at 5:30 a.m. and there were some variable fetal heart rate decelerations, although excellent variability throughout.  Because there was loss of capture of the fetal heart rate, a fetal scalp clip was placed in the second stage of labor that again confirmed variable decelerations, but moderate variability throughout.  The patient then went on to deliver a viable female  infant in the right occiput anterior position at 6:50 a.m.  Apgars were 7 and 9 at 1 and 5 minutes respectively, and weight was 6 pounds 14 ounces.  The NICU team was present due to some of the heart rate decelerations and initially the infant was allowed to be placed onto the maternal chest.  An umbilical cord clamping delayed for 1 minute prior to clamping and cutting.  However, then the baby's color was somewhat less pink and the NICU team wanted to take the baby to the warmer where oxygen saturations were found to be intermittently in the low 80s and so some suctioning and intermittent CPAP were done.  While the baby was being attended to by the NICU, the patient had spontaneous delivery of her placenta at 6:52 a.m.  The placenta was normal with a normal 3-vessel cord.  The patient sustained a second-degree perineal laceration.  This was repaired in the normal fashion with 3-0 Vicryl suture.  Her quantitative blood loss was 355 mL.  At the completion of the repair, the infant was felt to be maintaining oxygen saturations and color well enough that the infant was placed back on to the maternal chest for continued skin-to-skin.  Both mother and infant were doing well.  The sponge and needle counts were correct x 2.    First stage of labor, unable to be documented as not yet entered at onset of active labor time, but second stage of labor 1 hour and 40 minutes, and third stage 2 minutes.    DIAGNOSES:     1.  Intrauterine pregnancy, in spontaneous labor at 37 plus 6 with normal spontaneous vaginal delivery at 38+0.  2.  Epidural for pain control.  3.  Normal spontaneous vaginal delivery of a viable female infant.  4.  Second-degree perineal laceration, repaired in the normal fashion.  5.  Group B strep positive, adequately treated.    Ashley Reid MD        D: 2022   T: 2022   MT: [facility]    Name:     DAVIDSON MONTES  MRN:      [id number]        Account:       [id number]   :      1990            Delivery Date: 12/01/2022     Document: [id number]

## 2022-12-01 NOTE — DISCHARGE INSTRUCTIONS
DISCHARGE INSTRUCTIONS    Medications:  -May take Ibuprofen (800mg by mouth every 8 hours as needed for pain) or tylenol (500mg by mouth every 6 hours as needed for pain; max 4000mg per day) when at home as needed for pain/cramps/headaches/pain, follow instructions on bottle.  -You may use miralax (daily) or docusate (one tab by mouth twice daily) for stool softening, these are over the counter and can be found at any pharmacy. Follow bottle instructions.  -Continue prenatal vitamins.     Activity:  -Pelvic rest (no sex, tampons) for 6 weeks.   -General activity as tolerated, slowing increase daily. Avoid vigorous exercise, jumping or strength training for at least 4-6 weeks.  You may drive when you are able to safely operate a motor vehicle and are no longer taking narcotic medications if they have been prescribed for you.    Precuations:  -Call doctor if heavy unexpected bleeding, fever of 100.4 or greater, foul vaginal discharge, severe abdominal pain not helped with medications or for any other concerns or questions. If you are having headaches not resolved by tylenol or ibuprofen, new or different vision changes then notify your doctor.    Follow Up Visit:  -Call the [facility] to schedule your 6 week postpartum appointment, [phone].       Postpartum Vaginal Delivery Instructions    Activity     Ask family and friends for help when you need it.  Do not place anything in your vagina for 6 weeks.  You are not restricted on other activities, but take it easy for a few weeks to allow your body to recover from delivery.  You are able to do any activities you feel up to that point.  No driving until you have stopped taking your pain medications (usually two weeks after delivery).     Call your health care provider if you have any of these symptoms:     Increased pain, swelling, redness, or fluid around your stiches from an episiotomy or perineal tear.  A fever above 100.4 F (38 C) with or  without chills when placing a thermometer under your tongue.  You soak a sanitary pad with blood within 1 hour, or you see blood clots larger than a golf ball.  Bleeding that lasts more than 6 weeks.  Vaginal discharge that smells bad.  Severe pain, cramping or tenderness in your lower belly area.  A need to urinate more frequently (use the toilet more often), more urgently (use the toilet very quickly), or it burns when you urinate.  Nausea and vomiting.  Redness, swelling or pain around a vein in your leg.  Problems breastfeeding or a red or painful area on your breast.  Chest pain and cough or are gasping for air.  Problems coping with sadness, anxiety, or depression.  If you have any concerns about hurting yourself or the baby, call your provider immediately.   You have questions or concerns after you return home.     Keep your hands clean:  Always wash your hands before touching your perineal area and stitches.  This helps reduce your risk of infection.  If your hands aren't dirty, you may use an alcohol hand-rub to clean your hands. Keep your nails clean and short.

## 2022-12-01 NOTE — PROVIDER NOTIFICATION
12/01/22 0615   Provider Notification   Provider Name/Title Dr Reid   Method of Notification Phone   Request Attend Delivery   Notification Reason Decels;Labor Status   Requested MD come for delivery, updated on decels with pushing.

## 2022-12-01 NOTE — H&P
No significant change in general health status based on examination of the patient, review of Nursing Admission Database and prenatal record.    EFW: 7lb

## 2022-12-01 NOTE — CARE PLAN
Data: Patient admitted to room 215 at 21:11. Patient is a . Prenatal record reviewed.   OB History    Para Term  AB Living   2 0 0 0 1 0   SAB IAB Ectopic Multiple Live Births   0 0 0 0 0      # Outcome Date GA Lbr Yoandy/2nd Weight Sex Delivery Anes PTL Lv   2 Current            1 AB               Obstetric Comments   Meds used   .  Medical History:   Past Medical History:   Diagnosis Date     ADHD      Anxiety      Depression      Missed      meds   .  Gestational age 37w6d. Vital signs per doc flowsheet. Fetal movement present. Patient reports Laboring   as reason for admission. Support persons spouse present.  Action: Report from SUNNY Oliveira obtained at bedside.  Verbal consent for EFM, external fetal monitors applied. Admission assessment completed. Patient and support persons educated on labor process. Patient instructed to report change in fetal movement, contractions, vaginal leaking of fluid or bleeding, abdominal pain, or any concerns related to the pregnancy to her nurse/physician. Patient oriented to room, call light in reach.   Response:  Plan per provider is expectant management, epidural for pain when patient desires. Patient verbalized understanding of education and verbalized agreement with plan.

## 2022-12-01 NOTE — CARE PLAN
[facility] paged for epidural at 2148. [facility] Luis M in room at 2153. Medications, fentanyl & ropivicaine, handed off to [facility] at this time. Time out performed. Pt sat up at edge of bed. EFM off during procedure. Pt tolerated procedure well. Into left tilt position at 2215, EFM reapplied. BP set for every 2 minutes with continuous pulse oximeter in place.

## 2022-12-01 NOTE — ANESTHESIA PREPROCEDURE EVALUATION
Anesthesia Pre-Procedure Evaluation    Patient: Saskia Ortega   MRN: [id number] : 1990        Procedure : * No procedures listed *          Past Medical History:   Diagnosis Date     ADHD      Anxiety      Depression      Missed      meds      Past Surgical History:   Procedure Laterality Date     DENTAL SURGERY  2012    wisdom teeth extraction      No Known Allergies   Social History     Tobacco Use     Smoking status: Never     Smokeless tobacco: Never   Substance Use Topics     Alcohol use: Not Currently      Wt Readings from Last 1 Encounters:   22 82.6 kg (182 lb)        Anesthesia Evaluation            ROS/MED HX  ENT/Pulmonary:    (-) asthma   Neurologic:       Cardiovascular:    (-) PIH   METS/Exercise Tolerance:     Hematologic:     (+) no anticoagulation therapy, no coagulopathy,     Musculoskeletal:       GI/Hepatic:     (+) GERD,     Renal/Genitourinary:       Endo:       Psychiatric/Substance Use:     (+) psychiatric history anxiety and depression     Infectious Disease:       Malignancy:       Other:            Physical Exam    Airway        Mallampati: II   TM distance: > 3 FB   Neck ROM: full     Respiratory Devices and Support         Dental  no notable dental history         Cardiovascular             Pulmonary   pulmonary exam normal                OUTSIDE LABS:  CBC:   Lab Results   Component Value Date    WBC 16.2 (H) 2022    WBC 9.2 2022    HGB 11.3 (L) 2022    HGB 10.7 (L) 2022    HCT 34.5 (L) 2022    HCT 32.8 (L) 2022     2022     2022     BMP:   Lab Results   Component Value Date     2022     2015    POTASSIUM 3.8 2022    POTASSIUM 4.0 2015    CHLORIDE 102 2022    CHLORIDE 108 2015    CO2 26 2022    CO2 26 2015    BUN 11 2022    BUN 6 (L) 2015    CR 0.70 2022    CR 0.72 2015     (H) 2022     (H) 2015      COAGS: No results found for: PTT, INR, FIBR  POC:   Lab Results   Component Value Date    HCG Negative 07/10/2013    HCGS Negative 11/23/2015     HEPATIC:   Lab Results   Component Value Date    ALBUMIN 4.1 04/22/2022    PROTTOTAL 7.6 04/22/2022    ALT 25 04/22/2022    AST 13 04/22/2022    ALKPHOS 43 04/22/2022    BILITOTAL 0.6 04/22/2022     OTHER:   Lab Results   Component Value Date    SPENCER 9.4 04/22/2022    LIPASE 65 (L) 04/22/2022       Anesthesia Plan    ASA Status:  2      Anesthesia Type: Epidural.              Consents    Anesthesia Plan(s) and associated risks, benefits, and realistic alternatives discussed. Questions answered and patient/representative(s) expressed understanding.    - Discussed:     - Discussed with:  Patient         Postoperative Care            Comments:    Other Comments: Orders to manage the epidural infusion have been entered, and through coordination with the nurse, we will continute to manage and monitor the patient's labor epidural.  We will continuously be available to adjust as needed thruout the entire L&D process.             Dori Asencio MD

## 2022-12-01 NOTE — PLAN OF CARE
Patient arrived to [facility] via ambulation with significant other Miles.  Patient reports being in the office today with contractions and her cervix being about 3cm.  Patient reports some spotting, no leaking of fluid, positive fetal movement, and UCs increase in intensity start at about 1900 but has been debbie since yesterday.  Patient denies being watched closely during this pregnancy for any reason, denies any pain other than contraction pain, denies visual disturbances, RUQ pain, significant increase in swelling in hands or feet and headache.  Verbal consent obtained for treatment, external monitors applied, triage and admission questions answered at this time.  SVE: 4-5 / 80% / -2 posterior, soft and stretchy.

## 2022-12-02 VITALS
TEMPERATURE: 98 F | HEART RATE: 86 BPM | HEIGHT: 67 IN | RESPIRATION RATE: 18 BRPM | SYSTOLIC BLOOD PRESSURE: 109 MMHG | BODY MASS INDEX: 28.56 KG/M2 | OXYGEN SATURATION: 100 % | WEIGHT: 182 LBS | DIASTOLIC BLOOD PRESSURE: 74 MMHG

## 2022-12-02 LAB — HGB BLD-MCNC: 9 G/DL (ref 11.7–15.7)

## 2022-12-02 PROCEDURE — 36415 COLL VENOUS BLD VENIPUNCTURE: CPT | Performed by: OBSTETRICS & GYNECOLOGY

## 2022-12-02 PROCEDURE — 250N000011 HC RX IP 250 OP 636: Performed by: OBSTETRICS & GYNECOLOGY

## 2022-12-02 PROCEDURE — 250N000013 HC RX MED GY IP 250 OP 250 PS 637: Performed by: OBSTETRICS & GYNECOLOGY

## 2022-12-02 PROCEDURE — 85461 HEMOGLOBIN FETAL: CPT | Performed by: OBSTETRICS & GYNECOLOGY

## 2022-12-02 PROCEDURE — 85018 HEMOGLOBIN: CPT | Performed by: OBSTETRICS & GYNECOLOGY

## 2022-12-02 PROCEDURE — 86901 BLOOD TYPING SEROLOGIC RH(D): CPT | Performed by: OBSTETRICS & GYNECOLOGY

## 2022-12-02 RX ORDER — FERROUS SULFATE 325(65) MG
325 TABLET ORAL
Qty: 30 TABLET | Refills: 0 | Status: SHIPPED | OUTPATIENT
Start: 2022-12-02 | End: 2023-10-04

## 2022-12-02 RX ADMIN — DOCUSATE SODIUM 100 MG: 100 CAPSULE, LIQUID FILLED ORAL at 08:50

## 2022-12-02 RX ADMIN — IBUPROFEN 800 MG: 400 TABLET, FILM COATED ORAL at 03:09

## 2022-12-02 RX ADMIN — ACETAMINOPHEN 650 MG: 325 TABLET, FILM COATED ORAL at 08:50

## 2022-12-02 RX ADMIN — DULOXETINE 60 MG: 60 CAPSULE, DELAYED RELEASE ORAL at 08:50

## 2022-12-02 RX ADMIN — PRENATAL VITAMINS-IRON FUMARATE 27 MG IRON-FOLIC ACID 0.8 MG TABLET 1 TABLET: at 08:50

## 2022-12-02 RX ADMIN — HUMAN RHO(D) IMMUNE GLOBULIN 300 MCG: 1500 SOLUTION INTRAMUSCULAR; INTRAVENOUS at 11:29

## 2022-12-02 RX ADMIN — ACETAMINOPHEN 975 MG: 325 TABLET, FILM COATED ORAL at 03:09

## 2022-12-02 RX ADMIN — IBUPROFEN 800 MG: 400 TABLET, FILM COATED ORAL at 08:49

## 2022-12-02 ASSESSMENT — ACTIVITIES OF DAILY LIVING (ADL)
ADLS_ACUITY_SCORE: 18

## 2022-12-02 NOTE — PROGRESS NOTES
Obstetrics Postpartum Rounding Note, PPD#1    S: Doing well. Pain controlled. Breast feeding. Minimal lochia. Would like to go home today      O:   Vitals:    12/01/22 1630 12/01/22 2024 12/02/22 0002 12/02/22 0749   BP: 130/87 121/79 121/74 109/74   BP Location:  Right arm Right arm    Pulse: 86 93 82 86   Resp: 16 16 16 18   Temp: 98  F (36.7  C) 97.7  F (36.5  C) 97.5  F (36.4  C) 98  F (36.7  C)   TempSrc: Oral Oral Oral Oral   SpO2:       Weight:       Height:           Gen: AOx3, NAD  Abd: soft, ND, non ttp, FF@ U-2.   Ext: no calf ttp, no edema    Labs:         Hemoglobin   Date Value Ref Range Status   12/02/2022 9.0 (L) 11.7 - 15.7 g/dL Final   11/30/2022 11.3 (L) 11.7 - 15.7 g/dL Final   11/23/2015 14.0 11.7 - 15.7 g/dL Final          No results found for: RH    Meds:  Current Facility-Administered Medications   Medication     acetaminophen (TYLENOL) tablet 650 mg     acetaminophen (TYLENOL) tablet 975 mg     benzocaine (AMERICAINE) 20 % topical spray     bisacodyl (DULCOLAX) suppository 10 mg     carboprost (HEMABATE) injection 250 mcg     carboprost (HEMABATE) injection 250 mcg     docusate sodium (COLACE) capsule 100 mg     DULoxetine (CYMBALTA) DR capsule 60 mg     ePHEDrine injection 5 mg     fentaNYL (PF) (SUBLIMAZE) injection 100 mcg     fentaNYL (PF) (SUBLIMAZE) injection 100 mcg     fentaNYL (SUBLIMAZE) 2 mcg/mL, bupivacaine (MARCAINE) 0.125% in NS premix for PCEA     hydrocortisone (Perianal) (ANUSOL-HC) 2.5 % cream     ibuprofen (ADVIL/MOTRIN) tablet 800 mg     ketorolac (TORADOL) injection 30 mg    Or     ketorolac (TORADOL) injection 30 mg    Or     ibuprofen (ADVIL/MOTRIN) tablet 800 mg     lactated ringers BOLUS 1,000 mL    Or     lactated ringers BOLUS 500 mL     lactated ringers BOLUS 1,000 mL    Or     lactated ringers BOLUS 500 mL     lactated ringers BOLUS 250 mL     lactated ringers BOLUS 500 mL     lactated ringers infusion     lactated ringers infusion     lanolin cream     lidocaine  (LMX4) cream     lidocaine (LMX4) cream     lidocaine 1 % 0.1-1 mL     lidocaine 1 % 0.1-1 mL     Medication Instructions - cervical ripening and induction medications     methylergonovine (METHERGINE) injection 200 mcg     methylergonovine (METHERGINE) injection 200 mcg     metoclopramide (REGLAN) injection 10 mg    Or     metoclopramide (REGLAN) tablet 10 mg     misoprostol (CYTOTEC) tablet 400 mcg    Or     misoprostol (CYTOTEC) tablet 800 mcg     misoprostol (CYTOTEC) tablet 400 mcg    Or     misoprostol (CYTOTEC) tablet 800 mcg     nalbuphine (NUBAIN) injection 2.5-5 mg     naloxone (NARCAN) injection 0.2 mg    Or     naloxone (NARCAN) injection 0.4 mg    Or     naloxone (NARCAN) injection 0.2 mg    Or     naloxone (NARCAN) injection 0.4 mg     nitrous oxide/oxygen 50/50 blend     No MMR Needed - Assessment: Patient does not need MMR vaccine     No Tdap Needed - Assessment: Patient does not need Tdap vaccine     ondansetron (ZOFRAN ODT) ODT tab 4 mg    Or     ondansetron (ZOFRAN) injection 4 mg     ondansetron (ZOFRAN ODT) ODT tab 4 mg    Or     ondansetron (ZOFRAN) injection 4 mg     oxyCODONE (ROXICODONE) tablet 5 mg     oxytocin (PITOCIN) 30 units in 500 mL 0.9% NaCl infusion     oxytocin (PITOCIN) 30 units in 500 mL 0.9% NaCl infusion     oxytocin (PITOCIN) 30 units in 500 mL 0.9% NaCl infusion     oxytocin (PITOCIN) 30 units in 500 mL 0.9% NaCl infusion     oxytocin (PITOCIN) injection 10 Units     oxytocin (PITOCIN) injection 10 Units     oxytocin (PITOCIN) injection 10 Units     penicillin G potassium 3 Million Units in D5W 50 mL intermittent infusion     prenatal multivitamin w/iron per tablet     prochlorperazine (COMPAZINE) injection 10 mg    Or     prochlorperazine (COMPAZINE) tablet 10 mg    Or     prochlorperazine (COMPAZINE) suppository 25 mg     rho(D) immune globulin (RHOPHYLAC) injection 300 mcg    Or     rho(D) immune globulin (RHOPHYLAC) injection 300 mcg     ROPivacaine (NAROPIN) injection  10 mL     sodium chloride (PF) 0.9% PF flush 3 mL     sodium chloride (PF) 0.9% PF flush 3 mL     sodium chloride (PF) 0.9% PF flush 3 mL     sodium chloride (PF) 0.9% PF flush 3 mL     sodium citrate-citric acid (BICITRA) solution 30 mL     terbutaline (BRETHINE) injection 0.25 mg     tranexamic acid 1 g in 100 mL NS IV bag (premix)     tranexamic acid 1 g in 100 mL NS IV bag (premix)       A/P: PPD#1 s/p  doing well.  - Acute blood loss anemia. No symptoms. Home with oral iron  -Continue routine care.  -Anticipate discharge today. F/U 6wk    Zee Bautista Masters, DO  2022

## 2022-12-02 NOTE — PLAN OF CARE
Vital signs stable. Postpartum assessment WDL. Patient Rh: A neg, baby A pos. Rhogam study released. Hgb scheduled this AM. Pain controlled with Tylenol and Ibuprofen. Patient voiding without difficulty. Breastfeeding on cue with minimal assist. Patient and infant bonding well. Will continue with current plan of care.

## 2022-12-02 NOTE — ANESTHESIA POSTPROCEDURE EVALUATION
Patient: Saskia Ortega    Labor epidural    Diagnosis: Labor pain    Anesthesia Type:  Labor epidural    Note:  Disposition: Inpatient   Postop Pain Control: Uneventful            Sign Out: Well controlled pain   PONV: No   Neuro/Psych: Uneventful            Sign Out: Acceptable/Baseline neuro status   Airway/Respiratory: Uneventful            Sign Out: Acceptable/Baseline resp. status   CV/Hemodynamics: Uneventful            Sign Out: Acceptable CV status; No obvious hypovolemia; No obvious fluid overload   Other NRE: NONE   DID A NON-ROUTINE EVENT OCCUR? No    Event details/Postop Comments:  The patient denies numbness, weakness, or tingling in either of the lower extremities; denies positional headache; and denies significant back pain. The patient was then counseled on any potential concerning symptoms and if/when to reach out for further guidance. They expressed understanding of the instructions and felt comfortable with the plan.           Last vitals:  Vitals:    12/01/22 1100 12/01/22 1424 12/01/22 1630   BP:  121/80 130/87   Pulse:  82 86   Resp:  18 16   Temp:  36.6  C (97.9  F) 36.7  C (98  F)   SpO2: 100%         Electronically Signed By: Jozef Vallejo DO  December 1, 2022  7:36 PM

## 2022-12-02 NOTE — PLAN OF CARE
D: VSS, assessments WDL except for tender nipples, using lanolin   I: Pt. received complete discharge paperwork and home medications.  Pt. was given times of last dose for all discharge medications in writing on discharge medication sheets.  Discharge teaching included home medication, pain management, activity restrictions, postpartum cares, and signs and symptoms of infection.  Assisted with positioning and feeding to ensure baby was latching deeply enough.  A: Discharge outcomes on care plan met.  Mother states understanding and comfort with self care and follow up care.   P: Pt will be discharged to home.  Pt. to follow up with OB provider per discharge instructions.  Pt. had no further questions at the time of discharge and no unmet needs were identified.

## 2023-01-05 ENCOUNTER — VIRTUAL VISIT (OUTPATIENT)
Dept: PSYCHIATRY | Facility: CLINIC | Age: 33
End: 2023-01-05

## 2023-01-05 VITALS — HEIGHT: 66 IN | BODY MASS INDEX: 29.38 KG/M2

## 2023-01-05 DIAGNOSIS — F33.1 MAJOR DEPRESSIVE DISORDER, RECURRENT EPISODE, MODERATE (H): Primary | ICD-10-CM

## 2023-01-05 DIAGNOSIS — F90.2 ATTENTION DEFICIT HYPERACTIVITY DISORDER (ADHD), COMBINED TYPE: ICD-10-CM

## 2023-01-05 DIAGNOSIS — F90.0 ATTENTION DEFICIT HYPERACTIVITY DISORDER (ADHD), PREDOMINANTLY INATTENTIVE TYPE: ICD-10-CM

## 2023-01-05 RX ORDER — DEXTROAMPHETAMINE SACCHARATE, AMPHETAMINE ASPARTATE MONOHYDRATE, DEXTROAMPHETAMINE SULFATE AND AMPHETAMINE SULFATE 2.5; 2.5; 2.5; 2.5 MG/1; MG/1; MG/1; MG/1
20 CAPSULE, EXTENDED RELEASE ORAL DAILY
Qty: 60 CAPSULE | Refills: 0 | Status: SHIPPED | OUTPATIENT
Start: 2023-03-08 | End: 2023-04-07

## 2023-01-05 RX ORDER — DEXTROAMPHETAMINE SACCHARATE, AMPHETAMINE ASPARTATE MONOHYDRATE, DEXTROAMPHETAMINE SULFATE AND AMPHETAMINE SULFATE 2.5; 2.5; 2.5; 2.5 MG/1; MG/1; MG/1; MG/1
20 CAPSULE, EXTENDED RELEASE ORAL DAILY
Qty: 60 CAPSULE | Refills: 0 | Status: SHIPPED | OUTPATIENT
Start: 2023-02-05 | End: 2023-03-07

## 2023-01-05 RX ORDER — DULOXETIN HYDROCHLORIDE 60 MG/1
60 CAPSULE, DELAYED RELEASE ORAL DAILY
Qty: 30 CAPSULE | Refills: 5 | Status: SHIPPED | OUTPATIENT
Start: 2023-01-05 | End: 2023-08-03

## 2023-01-05 RX ORDER — DEXTROAMPHETAMINE SACCHARATE, AMPHETAMINE ASPARTATE MONOHYDRATE, DEXTROAMPHETAMINE SULFATE AND AMPHETAMINE SULFATE 2.5; 2.5; 2.5; 2.5 MG/1; MG/1; MG/1; MG/1
20 CAPSULE, EXTENDED RELEASE ORAL DAILY
Qty: 60 CAPSULE | Refills: 0 | Status: SHIPPED | OUTPATIENT
Start: 2023-01-05 | End: 2023-02-04

## 2023-01-05 ASSESSMENT — PAIN SCALES - GENERAL: PAINLEVEL: NO PAIN (0)

## 2023-01-05 ASSESSMENT — PATIENT HEALTH QUESTIONNAIRE - PHQ9
SUM OF ALL RESPONSES TO PHQ QUESTIONS 1-9: 1
10. IF YOU CHECKED OFF ANY PROBLEMS, HOW DIFFICULT HAVE THESE PROBLEMS MADE IT FOR YOU TO DO YOUR WORK, TAKE CARE OF THINGS AT HOME, OR GET ALONG WITH OTHER PEOPLE: NOT DIFFICULT AT ALL
SUM OF ALL RESPONSES TO PHQ QUESTIONS 1-9: 1

## 2023-01-05 NOTE — PROGRESS NOTES
"Saskia is a 30 year old who is being evaluated via a billable video visit.      How would you like to obtain your AVS? MyChart  If the video visit is dropped, the invitation should be resent by: Send to link via text  at: [phone] Will anyone else be joining your video visit? No    Video Start Time: 9:30 AM  Video-Visit Details    Type of service:  Video Visit    Video End Time:10:05 PM    Originating Location (pt. Location): Home    Distant Location (provider location):  [facility] PSYCHIATRY     Platform used for Video Visit: [facility]     PSYCHIATRY CLINIC PROGRESS NOTE  Medication management & Psychotherapy    IDENTIFICATION:  Saskia Ortega is a 29 year old female with previous psychiatric diagnoses of major depressive disorder, recurrent, generalized anxiety disorder, and ADHD.  Patient presents for ongoing psychiatric follow-up and was seen for initial evaluation on 5/04/2021.    SUBJECTIVE:  The patient was last seen in clinic on 7/08/2021 at which time no medication changes were made.  Since the time of the last visit:     Pt reports that she is doing well. Had a baby girl 5 weeks ago, named Jannet.    Mood has been good for the past week. She and partner have been building a house and they just moved in one week ago.    Had some \"scary intrusive\" thoughts after delivery incuding falling down the stairs carrying baby. States that she was home alone with baby a lot during the first couple of weeks due to partner being at the house as it was getting finished.    Pregnancy was difficult during first trimester as she had a lot of morning sickness. Also had a lot of difficulty with restrictions on her ability to complete activities of job with consequent negative self-talk.    Sleep was not great prior to delivery. Is still working to figure out nighttime routine with partner. Daughter is not yet sleeping through the night. Encouraged her to work with partner such that she is able to get more regular sleep.    Stopped " Adderall during pregnancy. This was difficult and struggled at work and at home to complete tasks. Continued to take Cymbalta through pregnancy.    Overall, mood has been stable since delivery.    Is currently questioning whether she wants to return to her previous position. Notes that the position is physically demanding and she had not been enjoying it very much prior to going on leave. She was not granted any paid time off for maternity leave which is making her question how much she wants to return to this position. Validated emotions about lack of employer support around maternity leave and encouraged her to defer decision on work until she is getting more regular sleep.    Recently restarted Adderall. She has only been taking about half of the XR (opening capsule and taking half of the beads). Provided education that, although trace amounts of stimulant can be detected in breast milk, general thought among medical community is that stimulants are safe for breast-feeding mothers. Provided [facility] reference for more information on stimulants for treatment of ADHD while breastfeeding: (https://womensmentalhealth.org/posts/essential-reads-breastfeeding-and-stimulants/)    Self-discontinued lorazepam prior to becoming pregnant.     Discussed challenges of new motherhood including conflicting emotions, challenges of breastfeeding, new anxieties of parenthood. Validated her emotional experiences as normal.    Overall, doing quite well. Encouraged her to reach out should she feel her mood dipping or should she have new/worsened intrusive thoughts or increased anxiety. Validated that first year of new parenthood can be challenging and strongly encouraged her to follow-up if needed.    Symptoms:  Denies inattention in the afternoon. Denies sleep disruption. Denies infrequent low mood, anhedonia, fatigue, self-derogatory thoughts, appetite disturbance, psychomotor slowing or concentration problems.  Denies suicidal ideation.  " Anxiety controlled, not impairing.   Medication side-effects:  Previously experienced fatigue, weight gain, and loss of libido associated with sertraline as well as fatigue and amotivation with higher dose citalopram.  Medical ROS:     Cardiovascular: negative for palpitations, tachycardia  Gastrointestinal: negative for increased appetite, nausea, vomiting, constipation and diarrhea  Neurologic: negative for headaches and cognitive problems  Psychiatric: negative for sleep disturbance, increased stress, feeling anxious, thoughts of self-harm, agitation and sexual difficulties.    MEDICAL TEAM:         - Primary Medical Provider:  unknown  - Therapist: none currently (previously followed by this provider for supportive psychotherapy)    ALLERGIES: NKDA    MEDICATIONS:  Adderall XR 20 mg daily  Adderall IR 10 mg qAfternoon  Duloxetine 60 mg daily   Lorazepam 0.5 mg daily PRN anxiety/sleep    LABS: no new results  VITALS: Ht 1.676 m (5' 6\")   LMP 03/10/2022   Breastfeeding Yes   BMI 29.38 kg/m      OBJECTIVE:   Alert and oriented.  Well groomed, calm, cooperative with good eye contact.  No problems with speech or psychomotor behavior.  Mood was described as \"very very good.\" Affect was euthymic with apparent brightness, congruent to speech content. Thought process was unremarkable and thought content was congruent to speech content, and devoid of suicidal and homicidal ideation and psychotic thought.  No hallucinations.  Insight was good.  Judgment was intact and adequate for safety.  Patient demonstrates no obvious problems with attention, concentration, language, short or long term memory by observation of conversation.  These were not formally tested.  Fund of knowledge was intact.    ASSESSMENT:    Historical:  Saskia Ortega is a 29 year old  female with history of depression and anxiety who presents for follow-up medication management. She was transitioned from Effexor to Celexa in spring/summer 2013 " with good results.  She was previously seen for monthly combination medication management and psychotherapy by her last provider.  She describes obtaining benefit from this and requested to increase frequency of sessions to every other week to work on processing family issues which have become more apparent after beginning a new relationship.  She continued to find benefit from Adderall for ADD and lorazepam as PRN for anxiety. She took them as prescribed and had no issues with substance abuse.  Pt has a history trials of Effexor and citalopram which were both effective for managing depression but discontinued secondary to side effects.  In addition, trial of Vyvanse was attempted during fall 2014, however, pt did not find it as effective as Adderall for management of ADD sx and so was transitioned back to Adderall.  At January 2015 visit, pt described significant worsening of sx of depression and cross-titration from citalopram to sertraline was initiated.  She tolerated transition well and initially felt mood was well managed at 150 mg daily.     Current:  Today, pt reports mood, anxiety, attention, and sleep are well managed with current medication regimen. Denies ongoing difficulties in relationship with boyfriend, however, appears to be navigating this well with appropriate emphasis on maintaining her mental health. She high been highly functional with ADHD controlled, she has been promoted at work since last visit.     For the future, may consider increasing dose of duloxetine to 90 mg vs trial of escitalopram and/or bupropion should depression symptoms or anxiety worsen.    The risks, benefits, alternatives and potential adverse effects have been explained and are understood by the patient.  The patient agrees to the plan with the capacity to do so.  The patient knows to call the clinic for any problems or access emergency care if needed. The patient is not pregnant.  She is not abusing substances and shows  no evidence for abuse of medication.  Controlled substances are still appropriate and required.  No medical contraindications to treatment.    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  (Refills x 6 months sent 01/05/23).  -- Self-discontinued lorazepam.  -- Continue Adderall XR 20 mg daily. (Refills x 3 months provided today, 01/05/23).  Psychotherapy:   Will continue with combined psychotherapy and medication management every 3 months.  RTC:  4-6 months for 30 min. U  Labs/Monitoring:    -- Recommend pt's weight NOT be checked prior to appointments.  -- Continue to monitor BP while on Cymbalta    Page Boston MD PGY-4. Seen and staffed with attending psychiatrist, Dr. Wilkins

## 2023-01-05 NOTE — PROGRESS NOTES
Saskia is a 32 year old who is being evaluated via a billable video visit.      How would you like to obtain your AVS? MyChart  If the video visit is dropped, the invitation should be resent by: Text to cell phone: [phone]  Will anyone else be joining your video visit? CRISTOPHER Gil      Video-Visit Details    Type of service:  Video Visit     Originating Location (pt. Location): Home    Distant Location (provider location):  On-site  Platform used for Video Visit: [employer]

## 2023-01-05 NOTE — NURSING NOTE
Patient reviewed medications and allergies in Mychart during e-check in and said everything looked correct.    Patient is taking ADDREALL 20 XR again with in the last week since having her baby      Trinidad Ordaz, VF

## 2023-02-20 ENCOUNTER — MYC REFILL (OUTPATIENT)
Dept: PSYCHIATRY | Facility: CLINIC | Age: 33
End: 2023-02-20

## 2023-02-20 DIAGNOSIS — F90.0 ATTENTION DEFICIT HYPERACTIVITY DISORDER (ADHD), PREDOMINANTLY INATTENTIVE TYPE: ICD-10-CM

## 2023-02-20 RX ORDER — DEXTROAMPHETAMINE SACCHARATE, AMPHETAMINE ASPARTATE MONOHYDRATE, DEXTROAMPHETAMINE SULFATE AND AMPHETAMINE SULFATE 2.5; 2.5; 2.5; 2.5 MG/1; MG/1; MG/1; MG/1
20 CAPSULE, EXTENDED RELEASE ORAL DAILY
Qty: 60 CAPSULE | Refills: 0 | Status: CANCELLED | OUTPATIENT
Start: 2023-02-20

## 2023-04-29 ENCOUNTER — HEALTH MAINTENANCE LETTER (OUTPATIENT)
Age: 33
End: 2023-04-29

## 2023-05-04 DIAGNOSIS — F90.0 ATTENTION DEFICIT HYPERACTIVITY DISORDER (ADHD), PREDOMINANTLY INATTENTIVE TYPE: Primary | ICD-10-CM

## 2023-05-04 DIAGNOSIS — F33.1 MAJOR DEPRESSIVE DISORDER, RECURRENT EPISODE, MODERATE (H): ICD-10-CM

## 2023-05-04 RX ORDER — DEXTROAMPHETAMINE SACCHARATE, AMPHETAMINE ASPARTATE MONOHYDRATE, DEXTROAMPHETAMINE SULFATE AND AMPHETAMINE SULFATE 2.5; 2.5; 2.5; 2.5 MG/1; MG/1; MG/1; MG/1
20 CAPSULE, EXTENDED RELEASE ORAL DAILY
Qty: 60 CAPSULE | Refills: 0 | Status: CANCELLED | OUTPATIENT
Start: 2023-06-04 | End: 2023-07-04

## 2023-05-04 RX ORDER — DEXTROAMPHETAMINE SACCHARATE, AMPHETAMINE ASPARTATE MONOHYDRATE, DEXTROAMPHETAMINE SULFATE AND AMPHETAMINE SULFATE 2.5; 2.5; 2.5; 2.5 MG/1; MG/1; MG/1; MG/1
20 CAPSULE, EXTENDED RELEASE ORAL DAILY
Qty: 60 CAPSULE | Refills: 0 | Status: CANCELLED | OUTPATIENT
Start: 2023-05-04 | End: 2023-06-03

## 2023-05-04 RX ORDER — DEXTROAMPHETAMINE SACCHARATE, AMPHETAMINE ASPARTATE MONOHYDRATE, DEXTROAMPHETAMINE SULFATE AND AMPHETAMINE SULFATE 2.5; 2.5; 2.5; 2.5 MG/1; MG/1; MG/1; MG/1
20 CAPSULE, EXTENDED RELEASE ORAL DAILY
Qty: 60 CAPSULE | Refills: 0 | Status: CANCELLED | OUTPATIENT
Start: 2023-07-05 | End: 2023-08-04

## 2023-05-04 NOTE — TELEPHONE ENCOUNTER
What is the concern that needs to be addressed by a nurse?   Patient is requesting a refill she says is completely out.  doesn't see the medication on her med list.    May a detailed message be left on voicemail? yes    Date of last office visit:    Message routed to: [email or web address]

## 2023-05-12 NOTE — TELEPHONE ENCOUNTER
Patient called in to check status of refill, advised that she will need to make appointment, patient made appointment for in July, but still needs refill because she is out of medication. Patient is completely out of medication, she's calling to check on status

## 2023-05-16 RX ORDER — DEXTROAMPHETAMINE SACCHARATE, AMPHETAMINE ASPARTATE MONOHYDRATE, DEXTROAMPHETAMINE SULFATE AND AMPHETAMINE SULFATE 2.5; 2.5; 2.5; 2.5 MG/1; MG/1; MG/1; MG/1
20 CAPSULE, EXTENDED RELEASE ORAL DAILY
Qty: 60 CAPSULE | Refills: 0 | Status: SHIPPED | OUTPATIENT
Start: 2023-05-16 | End: 2023-06-15

## 2023-05-16 RX ORDER — DEXTROAMPHETAMINE SACCHARATE, AMPHETAMINE ASPARTATE MONOHYDRATE, DEXTROAMPHETAMINE SULFATE AND AMPHETAMINE SULFATE 2.5; 2.5; 2.5; 2.5 MG/1; MG/1; MG/1; MG/1
20 CAPSULE, EXTENDED RELEASE ORAL DAILY
Qty: 60 CAPSULE | Refills: 0 | Status: SHIPPED | OUTPATIENT
Start: 2023-06-12 | End: 2023-07-12

## 2023-05-16 RX ORDER — DEXTROAMPHETAMINE SACCHARATE, AMPHETAMINE ASPARTATE MONOHYDRATE, DEXTROAMPHETAMINE SULFATE AND AMPHETAMINE SULFATE 2.5; 2.5; 2.5; 2.5 MG/1; MG/1; MG/1; MG/1
20 CAPSULE, EXTENDED RELEASE ORAL DAILY
Qty: 60 CAPSULE | Refills: 0 | Status: SHIPPED | OUTPATIENT
Start: 2023-07-13 | End: 2023-08-12

## 2023-07-13 ENCOUNTER — MYC REFILL (OUTPATIENT)
Dept: PSYCHIATRY | Facility: CLINIC | Age: 33
End: 2023-07-13

## 2023-07-13 DIAGNOSIS — F90.0 ATTENTION DEFICIT HYPERACTIVITY DISORDER (ADHD), PREDOMINANTLY INATTENTIVE TYPE: ICD-10-CM

## 2023-07-13 RX ORDER — DEXTROAMPHETAMINE SACCHARATE, AMPHETAMINE ASPARTATE MONOHYDRATE, DEXTROAMPHETAMINE SULFATE AND AMPHETAMINE SULFATE 2.5; 2.5; 2.5; 2.5 MG/1; MG/1; MG/1; MG/1
20 CAPSULE, EXTENDED RELEASE ORAL DAILY
Qty: 60 CAPSULE | Refills: 0 | OUTPATIENT
Start: 2023-07-13

## 2023-07-13 NOTE — TELEPHONE ENCOUNTER
What is the concern that needs to be addressed by a nurse? Patient needs refill for adderall 10 mg     May a detailed message be left on voicemail? yes    Date of last office visit:     Message routed to: [email or web address]

## 2023-07-14 RX ORDER — DEXTROAMPHETAMINE SACCHARATE, AMPHETAMINE ASPARTATE MONOHYDRATE, DEXTROAMPHETAMINE SULFATE AND AMPHETAMINE SULFATE 2.5; 2.5; 2.5; 2.5 MG/1; MG/1; MG/1; MG/1
20 CAPSULE, EXTENDED RELEASE ORAL DAILY
Qty: 60 CAPSULE | Refills: 0 | OUTPATIENT
Start: 2023-07-14

## 2023-07-14 NOTE — TELEPHONE ENCOUNTER
Duplicate   Called pharmacy to make sure a script was available and they have one -then called Saskia to let her know she can pick it up today

## 2023-07-30 DIAGNOSIS — F33.1 MAJOR DEPRESSIVE DISORDER, RECURRENT EPISODE, MODERATE (H): ICD-10-CM

## 2023-08-02 NOTE — TELEPHONE ENCOUNTER
Medication requested: DULOXETINE HCL DR 60 MG CAP  Last refilled: 1/5/23  Qty: 30/5      Last seen: 1/5/23  RTC: 4-6 months  Cancel: x 1 on 7/27/23  No-show: 0  Next appt: 9/21/23    Refill decision:   Refill pended and routed to the provider for review/determination due to   CANCEL X 1  SCHEDULED FOR Follow-up 9/21/23

## 2023-08-03 ENCOUNTER — TELEPHONE (OUTPATIENT)
Dept: PSYCHIATRY | Facility: CLINIC | Age: 33
End: 2023-08-03

## 2023-08-03 DIAGNOSIS — F33.1 MAJOR DEPRESSIVE DISORDER, RECURRENT EPISODE, MODERATE (H): Primary | ICD-10-CM

## 2023-08-03 RX ORDER — DULOXETIN HYDROCHLORIDE 60 MG/1
60 CAPSULE, DELAYED RELEASE ORAL DAILY
Qty: 30 CAPSULE | Refills: 1 | Status: SHIPPED | OUTPATIENT
Start: 2023-08-03 | End: 2023-10-30

## 2023-08-03 RX ORDER — DULOXETIN HYDROCHLORIDE 60 MG/1
60 CAPSULE, DELAYED RELEASE ORAL DAILY
Qty: 30 CAPSULE | Refills: 2 | Status: SHIPPED | OUTPATIENT
Start: 2023-08-03 | End: 2023-10-04

## 2023-08-03 NOTE — TELEPHONE ENCOUNTER
Last seen: 1/5  RTC: 4-6 months  Cancel: none  No-show: none  Next appt: 9/21    Incoming refill from patient via phone    Medication requested: DULoxetine (CYMBALTA) 60 MG capsule   Directions: kasia 1 capsule (60 mg) by mouth daily   Qty: 30 capsules  Last refilled: 1/5/2023    Medication refilled until next appointment  approved per refill protocol

## 2023-08-03 NOTE — TELEPHONE ENCOUNTER
Patient is calling regarding her refill request of duloxetine 60mg. Patient is out of the medication at this time.     Patient wants it sent to [place] [id number] [place] [address] [facility]

## 2023-09-19 NOTE — PROGRESS NOTES
"Virtual Visit Details    Type of service:  Video Visit     Originating Location (pt. Location): Home    Distant Location (provider location):  On-site  Platform used for Video Visit: Octaviano Kruger is a 30 year old who is being evaluated via a billable video visit.      How would you like to obtain your AVS? MyChart  If the video visit is dropped, the invitation should be resent by: Send to link via text  at: [phone] Will anyone else be joining your video visit? No      Video-Visit Details    Type of service:  Video Visit    Video Start Time: 9:55 AM    Video End Time:10:35 PM    Originating Location (pt. Location): Home    Distant Location (provider location):  [facility] PSYCHIATRY     Platform used for Video Visit: Well          [facility]  Psychiatry Clinic  PSYCHIATRIC PROGRESS NOTE       IDENTIFICATION:  Saskia Ortega is a 29 year old female with previous psychiatric diagnoses of major depressive disorder, recurrent, generalized anxiety disorder, and ADHD.  Patient presents for ongoing psychiatric follow-up and was seen for initial evaluation on 5/04/2021.    SUBJECTIVE:  The patient was last seen in clinic on 1/05/2023 at which time no medication changes were made.  Since the time of the last visit:   Pt reports that she is doing well. Since prevous visit, made decision not to return to work after maternity leave. However, notes that in the last 2 months she has felt like she would like to return to work. Mother is helping to spend time with daughter approximately 4 hours three days per week. But is feeling a lack of meaning without work outside the home. Expresses guilt about this. Validated this experience and communicated that a wish to return to work in no way suggests a lack of love or connection to daughter.  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  Daughter is doing well; just started crawling. Saskia notes that she is \"just a really good baby\"; sleeping 12-hours a night, eats well, is healthy.  Has noted that she has not been sleeping as well lately. Believes they need a new mattress and this would help. Notes that her mood is not always good. Is feeling depressed some days. Describes having some days when mother will take daughter and she will just stay in bed and not get anything on her to do list done. Also notes that she has had some increased self-isolation  She also notes that she has had some increased feelings of withdrawal from duloxetine which begins around noon and continues after she takes duloxetine. She also notes that she has generally been feeling more tired.  Discussed concern that mood has declined. Recommended increasing dose of duloxetine to 80mg and splitting the dose to see if withdrawal symptoms improve.    Symptoms:  Denies inattention in the afternoon. Denies sleep disruption. Denies infrequent low mood, anhedonia, fatigue, self-derogatory thoughts, appetite disturbance, psychomotor slowing or concentration problems.  Denies suicidal ideation.  Anxiety controlled, not impairing.   Medication side-effects:  Previously experienced fatigue, weight gain, and loss of libido associated with sertraline as well as fatigue and amotivation with higher dose citalopram.  Medical ROS:     Cardiovascular: negative for palpitations, tachycardia  Gastrointestinal: negative for increased appetite, nausea, vomiting, constipation and diarrhea  Neurologic: negative for headaches and cognitive problems  Psychiatric: negative for sleep disturbance, increased stress, feeling anxious, thoughts of self-harm, agitation and sexual difficulties.    MEDICAL TEAM:         - Primary Medical Provider:  unknown  - Therapist: none currently (previously followed by this provider for supportive psychotherapy)    ALLERGIES: " "NKDA    MEDICATIONS:  Adderall XR 20 mg daily  Adderall IR 10 mg qAfternoon  Duloxetine 60 mg daily   Lorazepam 0.5 mg daily PRN anxiety/sleep    LABS: no new results  VITALS: There were no vitals taken for this visit.    OBJECTIVE:   Alert and oriented.  Well groomed, calm, cooperative with good eye contact.  No problems with speech or psychomotor behavior.  Mood was described as \"very very good.\" Affect was euthymic with apparent brightness, congruent to speech content. Thought process was unremarkable and thought content was congruent to speech content, and devoid of suicidal and homicidal ideation and psychotic thought.  No hallucinations.  Insight was good.  Judgment was intact and adequate for safety.  Patient demonstrates no obvious problems with attention, concentration, language, short or long term memory by observation of conversation.  These were not formally tested.  Fund of knowledge was intact.    ASSESSMENT:    Historical:  Saskia Ortega is a 29 year old  female with history of depression and anxiety who presents for follow-up medication management. She was transitioned from Effexor to Celexa in spring/summer 2013 with good results.  She was previously seen for monthly combination medication management and psychotherapy by her last provider.  She describes obtaining benefit from this and requested to increase frequency of sessions to every other week to work on processing family issues which have become more apparent after beginning a new relationship.  She continued to find benefit from Adderall for ADD and lorazepam as PRN for anxiety. She took them as prescribed and had no issues with substance abuse.  Pt has a history trials of Effexor and citalopram which were both effective for managing depression but discontinued secondary to side effects.  In addition, trial of Vyvanse was attempted during fall 2014, however, pt did not find it as effective as Adderall for management of ADD sx and so " was transitioned back to Adderall.  At January 2015 visit, pt described significant worsening of sx of depression and cross-titration from citalopram to sertraline was initiated.  She tolerated transition well and initially felt mood was well managed at 150 mg daily.     Current:  Today, pt reports increased symptoms of depression including social withdrawal, amotivation, possible anhedonia, and worsened sleep. Recommended increasing dose of duloxetine today to 80mg and splitting dose to manage new experience of withdrawal prior to taking daily dose. Will also place referral for individual psychotherapy with [facility].    For the future, may consider increasing dose of duloxetine to 90 mg vs trial of escitalopram and/or bupropion should depression symptoms or anxiety worsen.    The risks, benefits, alternatives and potential adverse effects have been explained and are understood by the patient.  The patient agrees to the plan with the capacity to do so.  The patient knows to call the clinic for any problems or access emergency care if needed. The patient is not pregnant.  She is not abusing substances and shows no evidence for abuse of medication.  Controlled substances are still appropriate and required.  No medical contraindications to treatment.    DMS-5 DIAGNOSES:  Major depressive disorder, recurrent, moderate, in full remission (F33.42)  Generalized anxiety disorder (F41.1)  Attention deficit disorder, predominantly inattentive presentation, mild (F90.0)  Anorexia Nervosa, mild, in partial remission (F50.01)     PLAN:  Medications:   -- Increase duloxetine to 80 mg daily (split dose: 40mg BID; Refills x 6 months sent 09/28/23).  -- Continue Adderall XR 20 mg daily. (Refills x 3 months provided 09/28/23).  Psychotherapy:     -- Continue with combined supportive psychotherapy and medication management with this provider.   -- Referral for weekly individual psychotherapy through [facility]  RTC:  2 months for 30 min.  [facility]   -- Check-in with Nurse Paula Edgar in 1 month re: medication change and depression sx  Labs/Monitoring:    -- Recommend pt's weight NOT be checked prior to appointments.  -- Continue to monitor BP while on Cymbalta         Psychiatry Clinic Individual Psychotherapy Note                                                                     [16]     Start time: 9:55 AM        End time: 10:15 AM  Date last reviewed: 9/21/2023       Date next due: 9/20/2024     Subjective: This supportive psychotherapy session addressed issues related to orientation to therapy, goals of therapy, and current stressors consisting of current mood symptoms, work  and children .  Patient's reaction: Action in the context of mental status appropriate for ambulatory setting.  Progress: good  Plan: RTC 2 months  Psychotherapy services during this visit included myself and the patient.   Treatment Plan      SYMPTOMS; PROBLEMS   MEASURABLE GOALS;    FUNCTIONAL IMPROVEMENT INTERVENTIONS;   GAINS MADE DISCHARGE CRITERIA   Depression: anhedonia   find enjoyment at least once a day self-care skills  strength focus marked symptom improvement   Depression: depressed mood and feeling hopelesss   develop strategies for thought distraction when ruminating and reduce feeling overwhelmed/ improve decision making skills increase coping skills  strength focus  stress management marked symptom improvement     PROVIDER:  Yessica Wilkins MD    Level of Medical Decision Making:   - At least 1 chronic problem that is not stable  - Engaged in prescription drug management during visit (discussed any medication benefits, side effects, alternatives, etc.)

## 2023-09-21 ENCOUNTER — VIRTUAL VISIT (OUTPATIENT)
Dept: PSYCHIATRY | Facility: CLINIC | Age: 33
End: 2023-09-21
Payer: COMMERCIAL

## 2023-09-21 VITALS — WEIGHT: 145 LBS | HEIGHT: 66 IN | BODY MASS INDEX: 23.3 KG/M2

## 2023-09-21 DIAGNOSIS — Z86.59 HISTORY OF EATING DISORDER: ICD-10-CM

## 2023-09-21 DIAGNOSIS — F33.1 MODERATE EPISODE OF RECURRENT MAJOR DEPRESSIVE DISORDER (H): ICD-10-CM

## 2023-09-21 DIAGNOSIS — F90.0 ATTENTION DEFICIT HYPERACTIVITY DISORDER (ADHD), PREDOMINANTLY INATTENTIVE TYPE: Primary | ICD-10-CM

## 2023-09-21 DIAGNOSIS — F90.0 ATTENTION DEFICIT HYPERACTIVITY DISORDER (ADHD), PREDOMINANTLY INATTENTIVE TYPE: ICD-10-CM

## 2023-09-21 RX ORDER — DULOXETINE 40 MG/1
40 CAPSULE, DELAYED RELEASE ORAL 2 TIMES DAILY
Qty: 180 CAPSULE | Refills: 3 | Status: SHIPPED | OUTPATIENT
Start: 2023-09-21 | End: 2023-11-30

## 2023-09-21 RX ORDER — DEXTROAMPHETAMINE SACCHARATE, AMPHETAMINE ASPARTATE MONOHYDRATE, DEXTROAMPHETAMINE SULFATE AND AMPHETAMINE SULFATE 5; 5; 5; 5 MG/1; MG/1; MG/1; MG/1
20 CAPSULE, EXTENDED RELEASE ORAL DAILY
Qty: 30 CAPSULE | Refills: 0 | Status: SHIPPED | OUTPATIENT
Start: 2023-09-21 | End: 2023-10-04

## 2023-09-21 RX ORDER — DEXTROAMPHETAMINE SACCHARATE, AMPHETAMINE ASPARTATE MONOHYDRATE, DEXTROAMPHETAMINE SULFATE AND AMPHETAMINE SULFATE 5; 5; 5; 5 MG/1; MG/1; MG/1; MG/1
20 CAPSULE, EXTENDED RELEASE ORAL DAILY
Qty: 30 CAPSULE | Refills: 0 | Status: SHIPPED | OUTPATIENT
Start: 2023-10-22 | End: 2023-11-21

## 2023-09-21 RX ORDER — DEXTROAMPHETAMINE SACCHARATE, AMPHETAMINE ASPARTATE MONOHYDRATE, DEXTROAMPHETAMINE SULFATE AND AMPHETAMINE SULFATE 5; 5; 5; 5 MG/1; MG/1; MG/1; MG/1
20 CAPSULE, EXTENDED RELEASE ORAL DAILY
Qty: 30 CAPSULE | Refills: 0 | Status: SHIPPED | OUTPATIENT
Start: 2023-11-22 | End: 2023-10-04

## 2023-09-21 ASSESSMENT — PATIENT HEALTH QUESTIONNAIRE - PHQ9: SUM OF ALL RESPONSES TO PHQ QUESTIONS 1-9: 1

## 2023-09-21 ASSESSMENT — PAIN SCALES - GENERAL: PAINLEVEL: NO PAIN (0)

## 2023-09-21 NOTE — NURSING NOTE
Is the patient currently in the state of [place]? YES    Visit mode:VIDEO    If the visit is dropped, the patient can be reconnected by: VIDEO VISIT: Text to cell phone:   Telephone Information:   Mobile [phone]       Will anyone else be joining the visit? NO  (If patient encounters technical issues they should call [phone]    How would you like to obtain your AVS? MyChart    Are changes needed to the allergy or medication list? No    Reason for visit: RECHECK    Medication and allergies have been reviewed.     Tay HIGGINBOTHAM

## 2023-09-23 ENCOUNTER — TELEPHONE (OUTPATIENT)
Dept: CALL CENTER | Age: 33
End: 2023-09-23
Payer: COMMERCIAL

## 2023-09-23 NOTE — TELEPHONE ENCOUNTER
DULOXETINE HCL DR 40 MG CAP: Alternative med or PA  Pharm note:   Alternative Requested:INS DOES NOT COVER 2 CAPS AT ONCE.   PA REQ OR PT HAS TO COME BACK EVERY 15 DAYS TO REFILL.      Current rx:  DULoxetine HCl 40 MG CPEP   180 capsule 3 9/21/2023  No  Sig - Route: Take 40 mg by mouth 2 times daily - Oral  Prescribing Provider's NPI: [id number]  Yessica Wilkins  [id number] - [place], [place] - [address]    9-23-23 PA sent to PA team in telephone encounter

## 2023-09-23 NOTE — TELEPHONE ENCOUNTER
9-21-23 RF encounter, Pharm note  Alternative Requested:INS DOES NOT COVER 2 CAPS AT ONCE.   PA REQ OR PT HAS TO COME BACK EVERY 15 DAYS TO REFILL.      Attention PA Team  FYI to ME Psych RN    DULoxetine HCl 40 MG CPEP   180 capsule 3 9/21/2023  No  Sig - Route: Take 40 mg by mouth 2 times daily - Oral  Prescribing Provider's NPI: [id number]  Yessica Wilkins  [id number] - [place], [place] - [address]

## 2023-09-25 ENCOUNTER — TELEPHONE (OUTPATIENT)
Dept: PSYCHIATRY | Facility: CLINIC | Age: 33
End: 2023-09-25

## 2023-09-25 RX ORDER — DULOXETINE 40 MG/1
40 CAPSULE, DELAYED RELEASE ORAL 2 TIMES DAILY
Qty: 180 CAPSULE | Refills: 3 | OUTPATIENT
Start: 2023-09-25

## 2023-09-25 NOTE — TELEPHONE ENCOUNTER
Prior Authorization Retail Medication Request    Medication/Dose: Duloxetine 40 mg  ICD code (if different than what is on RX):  F33.1  Previously Tried and Failed:  see chart  Rationale:  quantity override    Insurance Name:    Insurance ID:        Pharmacy Information (if different than what is on RX)  [facility]  Phone:  [phone]

## 2023-09-27 NOTE — TELEPHONE ENCOUNTER
Prior Authorization Approval    Medication: DULOXETINE HCL 40 MG PO CPEP  Authorization Effective Date: 8/28/2023  Authorization Expiration Date: 9/26/2024  Approved Dose/Quantity:   Reference #:     Insurance Company: [employer] Non-Specialty PA's - Phone [phone] Fax [phone]  Expected CoPay: $    CoPay Card Available:      Financial Assistance Needed:   Which Pharmacy is filling the prescription: [facility] [id number] [place] [facility], [address]  Pharmacy Notified: Yes   Patient Notified: Pharmacy will notify patient when ready.

## 2023-09-27 NOTE — TELEPHONE ENCOUNTER
Central Prior Authorization Team   Phone: [phone]    PA Initiation    Medication: DULOXETINE HCL 40 MG PO CPEP  Insurance Company: [employer] Non-Specialty PA's - Phone [phone] Fax [phone]  Pharmacy Filling the Rx[id number] [place], [place] - [address]  Filling Pharmacy Phone: [phone]  Filling Pharmacy Fax:    Start Date: 9/27/2023

## 2023-09-28 PROBLEM — F33.1 MODERATE EPISODE OF RECURRENT MAJOR DEPRESSIVE DISORDER (H): Status: ACTIVE | Noted: 2023-09-28

## 2023-10-03 ASSESSMENT — ENCOUNTER SYMPTOMS
FREQUENCY: 0
SORE THROAT: 0
FEVER: 0
HEADACHES: 1
ABDOMINAL PAIN: 0
CHILLS: 0
ARTHRALGIAS: 0
JOINT SWELLING: 0
EYE PAIN: 0
WEAKNESS: 1
HEARTBURN: 0
BREAST MASS: 0
HEMATURIA: 0
PARESTHESIAS: 0
NERVOUS/ANXIOUS: 0
COUGH: 0
NAUSEA: 0
SHORTNESS OF BREATH: 0
DIARRHEA: 0
MYALGIAS: 1
PALPITATIONS: 0
DYSURIA: 0
CONSTIPATION: 0
DIZZINESS: 0
HEMATOCHEZIA: 0

## 2023-10-04 ENCOUNTER — OFFICE VISIT (OUTPATIENT)
Dept: OBGYN | Facility: CLINIC | Age: 33
End: 2023-10-04
Payer: COMMERCIAL

## 2023-10-04 VITALS
SYSTOLIC BLOOD PRESSURE: 120 MMHG | WEIGHT: 150.8 LBS | DIASTOLIC BLOOD PRESSURE: 60 MMHG | BODY MASS INDEX: 24.23 KG/M2 | HEIGHT: 66 IN

## 2023-10-04 DIAGNOSIS — Z23 FLU VACCINE NEED: ICD-10-CM

## 2023-10-04 DIAGNOSIS — Z01.419 ENCNTR FOR GYN EXAM (GENERAL) (ROUTINE) W/O ABN FINDINGS: Primary | ICD-10-CM

## 2023-10-04 PROCEDURE — 90471 IMMUNIZATION ADMIN: CPT | Performed by: OBSTETRICS & GYNECOLOGY

## 2023-10-04 PROCEDURE — 90686 IIV4 VACC NO PRSV 0.5 ML IM: CPT | Performed by: OBSTETRICS & GYNECOLOGY

## 2023-10-04 PROCEDURE — 99395 PREV VISIT EST AGE 18-39: CPT | Mod: 25 | Performed by: OBSTETRICS & GYNECOLOGY

## 2023-10-04 ASSESSMENT — PATIENT HEALTH QUESTIONNAIRE - PHQ9
5. POOR APPETITE OR OVEREATING: SEVERAL DAYS
SUM OF ALL RESPONSES TO PHQ QUESTIONS 1-9: 6

## 2023-10-04 ASSESSMENT — ANXIETY QUESTIONNAIRES
GAD7 TOTAL SCORE: 4
3. WORRYING TOO MUCH ABOUT DIFFERENT THINGS: SEVERAL DAYS
5. BEING SO RESTLESS THAT IT IS HARD TO SIT STILL: NOT AT ALL
6. BECOMING EASILY ANNOYED OR IRRITABLE: SEVERAL DAYS
IF YOU CHECKED OFF ANY PROBLEMS ON THIS QUESTIONNAIRE, HOW DIFFICULT HAVE THESE PROBLEMS MADE IT FOR YOU TO DO YOUR WORK, TAKE CARE OF THINGS AT HOME, OR GET ALONG WITH OTHER PEOPLE: NOT DIFFICULT AT ALL
1. FEELING NERVOUS, ANXIOUS, OR ON EDGE: SEVERAL DAYS
7. FEELING AFRAID AS IF SOMETHING AWFUL MIGHT HAPPEN: NOT AT ALL
2. NOT BEING ABLE TO STOP OR CONTROL WORRYING: NOT AT ALL
GAD7 TOTAL SCORE: 4

## 2023-10-04 NOTE — NURSING NOTE
Prior to immunization administration, verified patients identity using patient s name and date of birth. Please see Immunization Activity for additional information.     Screening Questionnaire for Adult Immunization    Are you sick today?   No   Do you have allergies to medications, food, a vaccine component or latex?   No   Have you ever had a serious reaction after receiving a vaccination?   No   Do you have a long-term health problem with heart, lung, kidney, or metabolic disease (e.g., diabetes), asthma, a blood disorder, no spleen, complement component deficiency, a cochlear implant, or a spinal fluid leak?  Are you on long-term aspirin therapy?   No   Do you have cancer, leukemia, HIV/AIDS, or any other immune system problem?   No   Do you have a parent, brother, or sister with an immune system problem?   No   In the past 3 months, have you taken medications that affect  your immune system, such as prednisone, other steroids, or anticancer drugs; drugs for the treatment of rheumatoid arthritis, Crohn s disease, or psoriasis; or have you had radiation treatments?   No   Have you had a seizure, or a brain or other nervous system problem?   No   During the past year, have you received a transfusion of blood or blood    products, or been given immune (gamma) globulin or antiviral drug?   No   For women: Are you pregnant or is there a chance you could become       pregnant during the next month?   No   Have you received any vaccinations in the past 4 weeks?   No     Immunization questionnaire answers were all negative.    FLU Vaccine given today 10/4/203 Site:       Patient instructed to remain in clinic for 15 minutes afterwards, and to report any adverse reactions.     Screening performed by Katya Johsnon MA on 10/4/2023 at 11:53 AM.

## 2023-10-04 NOTE — PROGRESS NOTES
"Saskia is a 33 year old  female who presents for annual exam.     Besides routine health maintenance, she has no other health concerns today .    HPI:  The patient's PCP: NO PCP on file.    No working. Would like to get back to work.    Met with Psychiatrist recently. Talked with her Psych about doing therapy      GYNECOLOGIC HISTORY:    Patient's last menstrual period was 2023 (exact date).    Regular menses? yes  Menses every 28-30 days.  Length of menses: 5 days    Her current contraception method is: none.  She  reports that she has never smoked. She has never used smokeless tobacco.    Patient is sexually active.  STD testing offered?  Declined  Last PHQ-9 score on record =       10/4/2023    11:55 AM   PHQ-9 SCORE   PHQ-9 Total Score 6     Last GAD7 score on record =       10/4/2023    11:55 AM   ZANE-7 SCORE   Total Score 4     Alcohol Score = 0    HEALTH MAINTENANCE:  Cholesterol: (No results found for: \"CHOL\"    Pap:   Lab Results   Component Value Date    PAP NIL 2021    PAP NIL 12/15/2017    PAP ASC-US 2012         Health maintenance updated:    Care Gaps  Close care gaps     Overdue       Never   Done ADVANCE CARE PLANNING (Every 5 Years)       Never   Done DEPRESSION ACTION PLAN (Once)       Never   Done Pneumococcal Vaccine: Pediatrics (0 to 5 Years) and At-Risk Patients (6 to 64 Years) (1 - PCV)       2022 YEARLY PREVENTIVE VISIT (Yearly)  Last completed: 2021     SEP 1   2023 INFLUENZA VACCINE (1)  Last completed: Sep 28, 2022     SEP 1   2023 COVID-19 Vaccine ( season)  Last completed: 2022        Due Soon       OCT 21   2023 PHQ-9 (Monthly)  Last completed: Sep 21, 2023        Upcoming       2026 HPV TEST (Once)  Last completed:  PAP (Every 5 Years)  Last completed: 2021     OCT 10   2032 DTAP/TDAP/TD IMMUNIZATION (9 - Td or Tdap)  Last completed: Oct 10, 2022     HISTORY:  OB History    " Para Term  AB Living   2 1 1 0 1 1   SAB IAB Ectopic Multiple Live Births   0 0 0 0 1      # Outcome Date GA Lbr Yoandy/2nd Weight Sex Delivery Anes PTL Lv   2 Term 22 38w0d / 01:40 3.12 kg (6 lb 14.1 oz) F Vag-Spont EPI, Local N TAI      Birth Comments: folloewd by Masters and delivered with inhouse but Jeanette in the room at time of delivery. 2nd degree lac      Name: JYOTI,FEMALE-DAVIDSON      Apgar1: 7  Apgar5: 9   1 AB               Obstetric Comments   Meds used       Patient Active Problem List   Diagnosis    Attention deficit hyperactivity disorder (ADHD)    Supervision of high-risk pregnancy    Depressive disorder in mother affecting pregnancy    Anxiety in pregnancy, antepartum    Need for rhogam due to Rh negative mother    History of eating disorder    Anxiety state    Biomechanical lesion, unspecified    Sciatica    Indication for care in labor or delivery    Moderate episode of recurrent major depressive disorder (H)     Past Surgical History:   Procedure Laterality Date    DENTAL SURGERY      wisdom teeth extraction      Social History     Tobacco Use    Smoking status: Never    Smokeless tobacco: Never   Substance Use Topics    Alcohol use: Not Currently      Problem (# of Occurrences) Relation (Name,Age of Onset)    Hypertension (1) Father    Cerebrovascular Disease (1) Father:      No Known Problems (7) Mother, Sister, Brother, Maternal Grandmother, Maternal Grandfather, Paternal Grandmother, Other              Current Outpatient Medications   Medication Sig    amphetamine-dextroamphetamine (ADDERALL XR) 20 MG 24 hr capsule Take 1 capsule (20 mg) by mouth daily for 30 days    DULoxetine (CYMBALTA) 60 MG capsule Take 1 capsule (60 mg) by mouth daily **For more refills,please keep scheduled appointment on 23**    DULoxetine HCl 40 MG CPEP Take 40 mg by mouth 2 times daily     No current facility-administered medications for this visit.     No Known Allergies    Past  "medical, surgical, social and family histories were reviewed and updated in EPIC.      EXAM:  /60   Ht 1.676 m (5' 6\")   Wt 68.4 kg (150 lb 12.8 oz)   LMP 09/25/2023 (Exact Date)   Breastfeeding No   BMI 24.34 kg/m     BMI: Body mass index is 24.34 kg/m .    PHYSICAL EXAM:  Constitutional:   Appearance: Well nourished, well developed, alert, in no acute distress  Neck:  Lymph Nodes:  No lymphadenopathy present    Thyroid:  Gland size normal, nontender, no nodules or masses present  on palpation  Chest:  Respiratory Effort:  Breathing unlabored  Cardiovascular:    Heart: Auscultation:  Regular rate, normal rhythm, no murmurs present  Breasts: Noramal exam, no masses, skin changes. No lymphadenopathy. No discharge.  Gastrointestinal:   Abdominal Examination:  Abdomen nontender to palpation, tone normal without rigidity or guarding, no masses present, umbilicus without lesions   Liver and Spleen:  No hepatomegaly present, liver nontender to palpation    Hernias:  No hernias present  Lymphatic: Lymph Nodes:  No other lymphadenopathy present  Skin:  General Inspection:  No rashes present, no lesions present, no areas of  discoloration  Neurologic:    Mental Status:  Oriented X3.  Normal strength and tone, sensory exam                grossly normal, mentation intact and speech normal.    Psychiatric:   Mentation appears normal and affect normal/bright.  External Genitalia:     Normal appearance for age, no discharge present, no tenderness present, no inflammatory lesions present, color normal  Vagina:     Normal vaginal vault without central or paravaginal defects, no discharge present, no inflammatory lesions present, no masses present  Bladder:     Nontender to palpation  Urethra:   Urethral Body:  Urethra palpation normal, urethra structural support normal   Urethral Meatus:  No erythema or lesions present  Cervix:     Appearance healthy, no lesions present, nontender to palpation, no bleeding " present  Uterus:     Uterus: firm, normal sized and nontender, anteverted in position.   Adnexa:     No adnexal tenderness present, no adnexal masses present  Perineum:     Perineum within normal limits, no evidence of trauma, no rashes or skin lesions present  Anus:     Anus within normal limits, no hemorrhoids present  Inguinal Lymph Nodes:     No lymphadenopathy present  Pubic Hair:     Normal pubic hair distribution for age  Genitalia and Groin:     No rashes present, no lesions present, no areas of discoloration, no masses present                  BMI: Body mass index is 24.34 kg/m .      ASSESSMENT:  33 year old female with satisfactory annual exam.    ICD-10-CM    1. Encntr for gyn exam (general) (routine) w/o abn findings  Z01.419       2. Flu vaccine need  Z23 INFLUENZA VACCINE IM > 6 MONTHS VALENT IIV4 (AFLURIA/FLUZONE)          PLAN:  -UTD for cervical cancer screening.    -Breast self awareness discussed.   -Osteoporosis prevention discussed.  -Influenza vacc  -Return one year for next annual exam        Zee Bautista Masters, DO

## 2023-10-25 ENCOUNTER — TELEPHONE (OUTPATIENT)
Dept: PSYCHIATRY | Facility: CLINIC | Age: 33
End: 2023-10-25

## 2023-10-25 NOTE — TELEPHONE ENCOUNTER
Writer called patient to check on Duloxetine dose.  Received voicemail.  Left message asking for a return call.  Clinic number provided.

## 2023-10-28 DIAGNOSIS — F33.1 MAJOR DEPRESSIVE DISORDER, RECURRENT EPISODE, MODERATE (H): ICD-10-CM

## 2023-10-30 RX ORDER — DULOXETIN HYDROCHLORIDE 60 MG/1
60 CAPSULE, DELAYED RELEASE ORAL DAILY
Qty: 30 CAPSULE | Refills: 0 | Status: SHIPPED | OUTPATIENT
Start: 2023-10-30 | End: 2023-11-30

## 2023-10-30 NOTE — TELEPHONE ENCOUNTER
Medication requested: DULoxetine (CYMBALTA) 60 MG capsule  Last refilled: 8/3/2023  Qty: 30/1       Last seen: 9/21/2023   RTC:   2 months   Cancel: 0  No-show: 0  Next appt: 11/9/2023      Refill decision: Refilled for 30 days per protocol.

## 2023-11-24 DIAGNOSIS — F33.1 MAJOR DEPRESSIVE DISORDER, RECURRENT EPISODE, MODERATE (H): ICD-10-CM

## 2023-11-27 NOTE — TELEPHONE ENCOUNTER
Medication requested: DULoxetine (CYMBALTA) 60 MG capsule   Last refilled: 10/30/23  Qty: 30      Last seen: 9/21/23  RTC: 2 months  Cancel: x 1 on 11/9/23  No-show: 0  Next appt: 11/30/23    Refill decision:   Is pt to be on this??  last note states..   PLAN:  Medications:   -- Increase duloxetine to 80 mg daily (split dose: 40mg BID; Refills x 6 months sent 09/28/23).

## 2023-11-28 RX ORDER — DULOXETIN HYDROCHLORIDE 60 MG/1
CAPSULE, DELAYED RELEASE ORAL
Qty: 30 CAPSULE | Refills: 0 | OUTPATIENT
Start: 2023-11-28

## 2023-11-30 ENCOUNTER — VIRTUAL VISIT (OUTPATIENT)
Dept: PSYCHIATRY | Facility: CLINIC | Age: 33
End: 2023-11-30
Payer: COMMERCIAL

## 2023-11-30 VITALS — BODY MASS INDEX: 24.34 KG/M2 | HEIGHT: 66 IN

## 2023-11-30 DIAGNOSIS — F90.0 ATTENTION DEFICIT HYPERACTIVITY DISORDER (ADHD), PREDOMINANTLY INATTENTIVE TYPE: ICD-10-CM

## 2023-11-30 DIAGNOSIS — F41.1 GENERALIZED ANXIETY DISORDER: ICD-10-CM

## 2023-11-30 DIAGNOSIS — F33.1 MAJOR DEPRESSIVE DISORDER, RECURRENT EPISODE, MODERATE (H): Primary | ICD-10-CM

## 2023-11-30 RX ORDER — DEXTROAMPHETAMINE SACCHARATE, AMPHETAMINE ASPARTATE MONOHYDRATE, DEXTROAMPHETAMINE SULFATE AND AMPHETAMINE SULFATE 5; 5; 5; 5 MG/1; MG/1; MG/1; MG/1
20 CAPSULE, EXTENDED RELEASE ORAL DAILY
Qty: 30 CAPSULE | Refills: 0 | Status: SHIPPED | OUTPATIENT
Start: 2023-12-31 | End: 2024-01-30

## 2023-11-30 RX ORDER — DULOXETIN HYDROCHLORIDE 60 MG/1
60 CAPSULE, DELAYED RELEASE ORAL DAILY
Qty: 30 CAPSULE | Refills: 5 | Status: SHIPPED | OUTPATIENT
Start: 2023-11-30 | End: 2024-02-08

## 2023-11-30 RX ORDER — DEXTROAMPHETAMINE SACCHARATE, AMPHETAMINE ASPARTATE MONOHYDRATE, DEXTROAMPHETAMINE SULFATE AND AMPHETAMINE SULFATE 5; 5; 5; 5 MG/1; MG/1; MG/1; MG/1
20 CAPSULE, EXTENDED RELEASE ORAL DAILY
Qty: 30 CAPSULE | Refills: 0 | Status: SHIPPED | OUTPATIENT
Start: 2024-01-31 | End: 2024-03-01

## 2023-11-30 RX ORDER — DEXTROAMPHETAMINE SACCHARATE, AMPHETAMINE ASPARTATE MONOHYDRATE, DEXTROAMPHETAMINE SULFATE AND AMPHETAMINE SULFATE 5; 5; 5; 5 MG/1; MG/1; MG/1; MG/1
20 CAPSULE, EXTENDED RELEASE ORAL DAILY
Qty: 30 CAPSULE | Refills: 0 | Status: SHIPPED | OUTPATIENT
Start: 2023-11-30 | End: 2023-12-30

## 2023-11-30 ASSESSMENT — PATIENT HEALTH QUESTIONNAIRE - PHQ9: SUM OF ALL RESPONSES TO PHQ QUESTIONS 1-9: 3

## 2023-11-30 ASSESSMENT — PAIN SCALES - GENERAL: PAINLEVEL: NO PAIN (0)

## 2023-11-30 NOTE — PROGRESS NOTES
"Virtual Visit Details    Type of service:  Video Visit     Originating Location (pt. Location): Home    Distant Location (provider location):  On-site  Platform used for Video Visit: Octaviano Kruger is a 30 year old who is being evaluated via a billable video visit.      How would you like to obtain your AVS? MyChart  If the video visit is dropped, the invitation should be resent by: Send to link via text  at: [phone] Will anyone else be joining your video visit? No               [facility]  Psychiatry Clinic  PSYCHIATRIC PROGRESS NOTE       IDENTIFICATION:  Saskia Ortega is a 33 year old female with previous psychiatric diagnoses of major depressive disorder, recurrent, generalized anxiety disorder, and ADHD.  Patient presents for ongoing psychiatric follow-up and was seen for initial evaluation on 5/04/2021.    SUBJECTIVE:  The patient was last seen in clinic on 9/21/2023 at which time duloxetine was increased to 80mg daily due to increased depression in the context of increased stress.      Since the time of the last visit:   Saskia reports that she has been doing well.  At last visit, we discussed increasing dose of duloxetine dose to 80mg. However, she learned that increasing dose would cost $150 per month.  She also realized that much of her previous low mood was largely situational with new baby. Given increased cost and feeling that she could make some lifestyle changes and elected not to increase dose.  Since then she has worked to improve physical activity and diet. Has also recognized that she would like to return to work as she feels that this was an important part of her identity.  Feels that mood is better today. Denies significant depression symptoms. Is working to be more social although is having some increased anxiety about whether she is \"doing enough.\"  Sleep has been better. Has been getting close to 8 hours a night  Is looking into going back to work. Is hoping to " "return to work in early 2024.  Notes that she has not used lorazepam recently for anxiety. Is working to use breathing and skills for managing anxiety.  Adderall continues to help with attention and prioritizing daily activities. She notes that she does not take it every day.    Symptoms:  Denies inattention in the afternoon. Denies sleep disruption. Denies infrequent low mood, anhedonia, fatigue, self-derogatory thoughts, appetite disturbance, psychomotor slowing or concentration problems.  Denies suicidal ideation.  Anxiety controlled, not impairing.   Medication side-effects:  Previously experienced fatigue, weight gain, and loss of libido associated with sertraline as well as fatigue and amotivation with higher dose citalopram.  Medical ROS:     Cardiovascular: negative for palpitations, tachycardia  Gastrointestinal: negative for increased appetite, nausea, vomiting, constipation and diarrhea  Neurologic: negative for headaches and cognitive problems  Psychiatric: negative for sleep disturbance, increased stress, feeling anxious, thoughts of self-harm, agitation and sexual difficulties.    MEDICAL TEAM:         - Primary Medical Provider:  unknown  - Therapist: none currently (previously followed by this provider for supportive psychotherapy)    ALLERGIES: NKDA    MEDICATIONS:  Adderall XR 20 mg daily  Adderall IR 10 mg qAfternoon  Duloxetine 60 mg daily   Lorazepam 0.5 mg daily PRN anxiety/sleep    LABS: no new results  VITALS: Ht 1.676 m (5' 6\")   LMP 09/25/2023 (Exact Date)   BMI 24.34 kg/m      OBJECTIVE:   Alert and oriented.  Well groomed, calm, cooperative with good eye contact.  No problems with speech or psychomotor behavior.  Mood was described as \"very very good.\" Affect was euthymic with apparent brightness, congruent to speech content. Thought process was unremarkable and thought content was congruent to speech content, and devoid of suicidal and homicidal ideation and psychotic thought.  No " hallucinations.  Insight was good.  Judgment was intact and adequate for safety.  Patient demonstrates no obvious problems with attention, concentration, language, short or long term memory by observation of conversation.  These were not formally tested.  Fund of knowledge was intact.    ASSESSMENT:    Historical:  Saskia Ortega is a 33 year old  female with history of depression and anxiety who presents for follow-up medication management. She was transitioned from Effexor to Celexa in spring/summer 2013 with good results.  She was previously seen for monthly combination medication management and psychotherapy by her last provider.  She describes obtaining benefit from this and requested to increase frequency of sessions to every other week to work on processing family issues which have become more apparent after beginning a new relationship.  She continued to find benefit from Adderall for ADD and lorazepam as PRN for anxiety. She took them as prescribed and had no issues with substance abuse.  Pt has a history trials of Effexor and citalopram which were both effective for managing depression but discontinued secondary to side effects.  In addition, trial of Vyvanse was attempted during fall 2014, however, pt did not find it as effective as Adderall for management of ADD sx and so was transitioned back to Adderall.  At January 2015 visit, pt described significant worsening of sx of depression and cross-titration from citalopram to sertraline was initiated.  She tolerated transition well and initially felt mood was well managed at 150 mg daily.     Current:  Today, pt reports improvement in mood and having minimal symptoms of depression with lifestyle changes and further acceptance of her life with child. She did not increase duloxetine due to cost after last visit, but has done well nonetheless.  Will continue duloxetine at 60mg daily given mental health stability.    For the future, may consider increasing  dose of duloxetine to 90 mg vs trial of escitalopram and/or bupropion should depression symptoms or anxiety worsen.    The risks, benefits, alternatives and potential adverse effects have been explained and are understood by the patient.  The patient agrees to the plan with the capacity to do so.  The patient knows to call the clinic for any problems or access emergency care if needed. The patient is not pregnant.  She is not abusing substances and shows no evidence for abuse of medication.  Controlled substances are still appropriate and required.  No medical contraindications to treatment.    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  -- Continue Adderall XR 20 mg daily. (Refills x 3 months provided 11/30/23).  Psychotherapy:     -- Continue with combined supportive psychotherapy and medication management with this provider.   -- Referral for weekly individual psych[facility]  RTC:  2 months for 30 min. [facility]   -- Check-in with Nurse Paula Edgar in 1 month re: medication change and depression sx  Labs/Monitoring:    -- Recommend pt's weight NOT be checked prior to appointments.  -- Continue to monitor BP while on Cymbalta         Psychiatry Clinic Individual Psychotherapy Note                                                                     [16]     Start time: 9:59 AM        End time: 10:30 AM  Date last reviewed: 9/21/2023       Date next due: 9/20/2024     Subjective: This supportive psychotherapy session addressed issues related to orientation to therapy, goals of therapy, and current stressors consisting of current mood symptoms, work  and children .  Patient's reaction: Action  in the context of mental status appropriate for ambulatory setting.  Progress: good  Plan: RTC 2 months  Psychotherapy services during this visit included myself and the patient.   Treatment Plan      SYMPTOMS; PROBLEMS   MEASURABLE GOALS;    FUNCTIONAL IMPROVEMENT INTERVENTIONS;   GAINS MADE DISCHARGE CRITERIA   Depression: anhedonia   find enjoyment at least once a day self-care skills  strength focus marked symptom improvement   Depression: depressed mood and feeling hopelesss   develop strategies for thought distraction when ruminating and reduce feeling overwhelmed/ improve decision making skills increase coping skills  strength focus  stress management marked symptom improvement     PROVIDER:  Yessica Wilkins MD    Level of Medical Decision Making:   - At least 1 chronic problem that is not stable  - Engaged in prescription drug management during visit (discussed any medication benefits, side effects, alternatives, etc.)

## 2023-11-30 NOTE — PROGRESS NOTES
Virtual Visit Details    Type of service:  Video Visit     Originating Location (pt. Location): Home    Distant Location (provider location):  On-site  Platform used for Video Visit: Octaviano

## 2023-11-30 NOTE — NURSING NOTE
Is the patient currently in the state of [place]? YES    Visit mode:VIDEO    If the visit is dropped, the patient can be reconnected by: VIDEO VISIT: Text to cell phone:   Telephone Information:   Mobile [phone]       Will anyone else be joining the visit? NO  (If patient encounters technical issues they should call [phone]    How would you like to obtain your AVS? MyChart    Are changes needed to the allergy or medication list? Yes Pt states taking Adderall XR 20MG capsule once daily.     Reason for visit: RECHECK    Medications and allergies have been reviewed.     Tay HIGGINBOTHAM

## 2024-02-08 ENCOUNTER — VIRTUAL VISIT (OUTPATIENT)
Dept: PSYCHIATRY | Facility: CLINIC | Age: 34
End: 2024-02-08
Payer: COMMERCIAL

## 2024-02-08 VITALS — WEIGHT: 140 LBS | BODY MASS INDEX: 22.5 KG/M2 | HEIGHT: 66 IN

## 2024-02-08 DIAGNOSIS — F33.1 MAJOR DEPRESSIVE DISORDER, RECURRENT EPISODE, MODERATE (H): Primary | ICD-10-CM

## 2024-02-08 RX ORDER — TRAZODONE HYDROCHLORIDE 100 MG/1
50 TABLET ORAL AT BEDTIME
Qty: 30 TABLET | Refills: 3 | Status: SHIPPED | OUTPATIENT
Start: 2024-02-08

## 2024-02-08 RX ORDER — DULOXETIN HYDROCHLORIDE 30 MG/1
90 CAPSULE, DELAYED RELEASE ORAL DAILY
Qty: 90 CAPSULE | Refills: 1 | Status: SHIPPED | OUTPATIENT
Start: 2024-02-08 | End: 2024-03-21

## 2024-02-08 ASSESSMENT — ANXIETY QUESTIONNAIRES
IF YOU CHECKED OFF ANY PROBLEMS ON THIS QUESTIONNAIRE, HOW DIFFICULT HAVE THESE PROBLEMS MADE IT FOR YOU TO DO YOUR WORK, TAKE CARE OF THINGS AT HOME, OR GET ALONG WITH OTHER PEOPLE: VERY DIFFICULT
4. TROUBLE RELAXING: SEVERAL DAYS
GAD7 TOTAL SCORE: 7
7. FEELING AFRAID AS IF SOMETHING AWFUL MIGHT HAPPEN: NOT AT ALL
3. WORRYING TOO MUCH ABOUT DIFFERENT THINGS: SEVERAL DAYS
1. FEELING NERVOUS, ANXIOUS, OR ON EDGE: MORE THAN HALF THE DAYS
7. FEELING AFRAID AS IF SOMETHING AWFUL MIGHT HAPPEN: NOT AT ALL
2. NOT BEING ABLE TO STOP OR CONTROL WORRYING: SEVERAL DAYS
GAD7 TOTAL SCORE: 7
8. IF YOU CHECKED OFF ANY PROBLEMS, HOW DIFFICULT HAVE THESE MADE IT FOR YOU TO DO YOUR WORK, TAKE CARE OF THINGS AT HOME, OR GET ALONG WITH OTHER PEOPLE?: VERY DIFFICULT
6. BECOMING EASILY ANNOYED OR IRRITABLE: MORE THAN HALF THE DAYS
5. BEING SO RESTLESS THAT IT IS HARD TO SIT STILL: NOT AT ALL

## 2024-02-08 ASSESSMENT — PATIENT HEALTH QUESTIONNAIRE - PHQ9: SUM OF ALL RESPONSES TO PHQ QUESTIONS 1-9: 7

## 2024-02-08 ASSESSMENT — PAIN SCALES - GENERAL: PAINLEVEL: NO PAIN (0)

## 2024-02-08 NOTE — PROGRESS NOTES
"Virtual Visit Details    Type of service:  Video Visit     Originating Location (pt. Location): Home    Distant Location (provider location):  On-site  Platform used for Video Visit: Well             [facility]  Psychiatry Clinic  PSYCHIATRIC PROGRESS NOTE       IDENTIFICATION:  Saskia Ortega is a 33 year old female with previous psychiatric diagnoses of major depressive disorder, recurrent, generalized anxiety disorder, and ADHD.  Patient presents for ongoing psychiatric follow-up and was seen for initial evaluation on 5/04/2021.    SUBJECTIVE:  The patient was last seen in clinic on 11/30/23 when no medication changes were made.      Since the time of the last visit:   -mood has been a little down having daughter due to perceived decrease in self worth, loss of identity, stopping work. Feels that others notice her struggling  -home life is going well. Poppy is a great sleeper  -felt anxious in public at [employer].   -not working provides time to feel guilty, pay attention to world politics  (feels anxious about [place])   -Support network is limited, not many peers, family is supportive.  -Sleep is a lot better with new mattress. Good in general. Trying to not sleep during the day, but has more over the past month - \"a reaction to my body feeling so overwhelmed\" and also feeling \"empty, like I dont have anything to do\"  -Appetite is good  -feeling anxious and like she cannot calm  -no disturbing thoughts regarding her or Poppy    Symptoms:  Denies inattention in the afternoon. Denies sleep disruption. Denies infrequent low mood, anhedonia, fatigue, self-derogatory thoughts, appetite disturbance, psychomotor slowing or concentration problems.  Denies suicidal ideation.  Anxiety controlled, not impairing.   Medication side-effects:  Previously experienced fatigue, weight gain, and loss of libido associated with sertraline as well as fatigue and amotivation with higher dose " "citalopram.  Medical ROS:     Cardiovascular: negative for palpitations, tachycardia  Gastrointestinal: negative for increased appetite, nausea, vomiting, constipation and diarrhea  Neurologic: negative for headaches and cognitive problems  Psychiatric: negative for sleep disturbance, increased stress, feeling anxious, thoughts of self-harm, agitation and sexual difficulties.    MEDICAL TEAM:         - Primary Medical Provider:  unknown  - Therapist: none currently (previously followed by this provider for supportive psychotherapy)    ALLERGIES: NKDA    MEDICATIONS:  Adderall XR 20 mg daily  Adderall IR 10 mg qAfternoon  Duloxetine 60 mg daily   Lorazepam 0.5 mg daily PRN anxiety/sleep    LABS: no new results  VITALS: Ht 1.676 m (5' 6\")   Wt 63.5 kg (140 lb)   BMI 22.60 kg/m      OBJECTIVE:   Alert and oriented.  Well groomed, calm, cooperative with good eye contact.  No problems with speech or psychomotor behavior.  Mood was described as \"very very good.\" Affect was euthymic with apparent brightness, congruent to speech content. Thought process was unremarkable and thought content was congruent to speech content, and devoid of suicidal and homicidal ideation and psychotic thought.  No hallucinations.  Insight was good.  Judgment was intact and adequate for safety.  Patient demonstrates no obvious problems with attention, concentration, language, short or long term memory by observation of conversation.  These were not formally tested.  Fund of knowledge was intact.    ASSESSMENT:    Historical:  Saskia Ortega is a 33 year old  female with history of depression and anxiety who presents for follow-up medication management. She was transitioned from Effexor to Celexa in spring/summer 2013 with good results.  She was previously seen for monthly combination medication management and psychotherapy by her last provider.  She describes obtaining benefit from this and requested to increase frequency of sessions to " every other week to work on processing family issues which have become more apparent after beginning a new relationship.  She continued to find benefit from Adderall for ADD and lorazepam as PRN for anxiety. She took them as prescribed and had no issues with substance abuse.  Pt has a history trials of Effexor and citalopram which were both effective for managing depression but discontinued secondary to side effects.  In addition, trial of Vyvanse was attempted during fall 2014, however, pt did not find it as effective as Adderall for management of ADD sx and so was transitioned back to Adderall.  At January 2015 visit, pt described significant worsening of sx of depression and cross-titration from citalopram to sertraline was initiated.  She tolerated transition well and initially felt mood was well managed at 150 mg daily.     Current:  Today, pt reports improvement in mood and having minimal symptoms of depression with lifestyle changes and further acceptance of her life with child. She did not increase duloxetine due to cost after last visit, but has done well nonetheless.  Will continue duloxetine at 60mg daily given mental health stability.    For the future, may consider increasing dose of duloxetine to 90 mg vs trial of escitalopram and/or bupropion should depression symptoms or anxiety worsen.    The risks, benefits, alternatives and potential adverse effects have been explained and are understood by the patient.  The patient agrees to the plan with the capacity to do so.  The patient knows to call the clinic for any problems or access emergency care if needed. The patient is not pregnant.  She is not abusing substances and shows no evidence for abuse of medication.  Controlled substances are still appropriate and required.  No medical contraindications to treatment.    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  -- Continue Adderall XR 20 mg daily. (Refills x 3 months provided 11/30/23).  -- Start trazodone 50mg at bedtime for sleep ( 30-day rx + 3RF sent 2/08/24)  Psychotherapy:     -- Continue with combined supportive psychotherapy and medication management with this provider.   -- Referral for weekly individual psychotherapy through [facility]  RTC:  2 months for 30 min. [facility]   -- Check-in with Nurse Paula Edgar in 1 month re: medication change and depression sx  Labs/Monitoring:    -- Recommend pt's weight NOT be checked prior to appointments.  -- Continue to monitor BP while on Cymbalta         Psychiatry Clinic Individual Psychotherapy Note                                                                     [16]     Start time: 10:45 AM        End time: 10:30 AM  Date last reviewed: 9/21/2023       Date next due: 9/20/2024     Subjective: This supportive psychotherapy session addressed issues related to orientation to therapy, goals of therapy, and current stressors consisting of current mood symptoms, work  and children .  Patient's reaction: Action in the context of mental status appropriate for ambulatory setting.  Progress: good  Plan: RTC 2 months  Psychotherapy services during this visit included myself and the patient.   Treatment Plan      SYMPTOMS; PROBLEMS   MEASURABLE GOALS;    FUNCTIONAL IMPROVEMENT INTERVENTIONS;   GAINS MADE DISCHARGE CRITERIA   Depression: anhedonia   find enjoyment at least once a day self-care skills  strength focus marked symptom improvement   Depression: depressed mood and feeling hopelesss   develop strategies for thought distraction when ruminating and reduce feeling overwhelmed/ improve decision making skills increase coping skills  strength  focus  stress management marked symptom improvement     PROVIDER:  Yessica Wilkins MD    Level of Medical Decision Making:   - *HIGH ACUITY* - At least 1 chronic problem with severe exacerbation, progression, or side effects of treatment  - Moderate (or greater) risk of harm to self or others  - Functional impairment that could lead to serious consequences    {   Must meet 2 out of 3 of the above MDM elements to bill at the specified level     For help more info about elements / criteria, click here-> MDM Help Grid     **No need to delete blue text, it disappears when note is signed**      The longitudinal plan of care for Saskia was addressed during this visit. Due to the added complexity in care, I will continue to support Saskia in the subsequent management of this condition(s) and with the ongoing continuity of care of this condition(s).     Level of Medical Decision Making:   - At least 1 chronic problem that is not stable  - Engaged in prescription drug management during visit (discussed any medication benefits, side effects, alternatives, etc.)

## 2024-02-08 NOTE — NURSING NOTE
"     Is the patient currently in the state of [place]? YES    Visit mode:VIDEO    If the visit is dropped, the patient can be reconnected by: VIDEO VISIT: Text to cell phone:   Telephone Information:   Mobile [phone]       Will anyone else be joining the visit? NO  (If patient encounters technical issues they should call [phone]    How would you like to obtain your AVS? MyChart    Are changes needed to the allergy or medication list? No    Reason for visit: RECHECK (Patient said, \"Has been dealing with a lot more Anxiety.\" )      Trinidad Ordaz VVF     "

## 2024-03-21 ENCOUNTER — MYC REFILL (OUTPATIENT)
Dept: PSYCHIATRY | Facility: CLINIC | Age: 34
End: 2024-03-21

## 2024-03-21 DIAGNOSIS — F33.1 MAJOR DEPRESSIVE DISORDER, RECURRENT EPISODE, MODERATE (H): ICD-10-CM

## 2024-03-21 RX ORDER — DULOXETIN HYDROCHLORIDE 30 MG/1
90 CAPSULE, DELAYED RELEASE ORAL DAILY
Qty: 90 CAPSULE | Refills: 0 | Status: SHIPPED | OUTPATIENT
Start: 2024-03-21 | End: 2024-04-10

## 2024-03-21 NOTE — TELEPHONE ENCOUNTER
Last seen: 2/8/24  RTC: 2 months  Cancel: none  No-show: none  Next appt: none scheduled    Incoming refill from patient via mychart    Medication requested: Duloxetine 30 mg   Directions: Take 3 capsules by mouth daily   Qty: 90  Last refilled: 2/8/24 with one refill     Medication refill approved per refill protocol

## 2024-04-10 ENCOUNTER — TELEPHONE (OUTPATIENT)
Dept: PSYCHIATRY | Facility: CLINIC | Age: 34
End: 2024-04-10

## 2024-04-10 DIAGNOSIS — F33.1 MAJOR DEPRESSIVE DISORDER, RECURRENT EPISODE, MODERATE (H): Primary | ICD-10-CM

## 2024-04-10 RX ORDER — DULOXETIN HYDROCHLORIDE 60 MG/1
60 CAPSULE, DELAYED RELEASE ORAL DAILY
Qty: 30 CAPSULE | Refills: 1 | Status: SHIPPED | OUTPATIENT
Start: 2024-04-10 | End: 2024-06-20

## 2024-04-10 RX ORDER — DULOXETIN HYDROCHLORIDE 30 MG/1
30 CAPSULE, DELAYED RELEASE ORAL DAILY
Qty: 30 CAPSULE | Refills: 1 | Status: SHIPPED | OUTPATIENT
Start: 2024-04-10 | End: 2024-06-20

## 2024-04-10 NOTE — TELEPHONE ENCOUNTER
Writer spoke with pharmacy. Pharmacy stated the insurance is only allowing one capsule a day of Cymbalta that is why patient is only getting a 10 day supply. They suggested sending a 30mg and 60mg capsule to see if insurance will allow that without have to submit a PA. Patient agreed with this plan. Writer sent updated prescriptions to pharmacy.

## 2024-04-10 NOTE — TELEPHONE ENCOUNTER
What is the concern that needs to be addressed by a nurse? Pt would like a call to discuss if there is a reason that she only receives a 10 day supply of medication at a time. Please give pt a call to explain why.     May a detailed message be left on voicemail? Yes    Date of last office visit: 2-8-24    Message routed to: Brayan Kuhn

## 2024-06-16 DIAGNOSIS — F33.1 MAJOR DEPRESSIVE DISORDER, RECURRENT EPISODE, MODERATE (H): ICD-10-CM

## 2024-06-19 NOTE — TELEPHONE ENCOUNTER
"Date of Last Office Visit: 2/8/24 - Claudette  RTC: 2 months for 30 min. [facility]              -- Check-in with Nurse Paula Edgar in 1 month re: medication change and depression sx  No shows: 0  Cancellations: x1 - 7/25  Date of Next Office Visit: 0  ------------------------------    Incoming refill from Pharmacy via interface  Medication requested:    DULoxetine (CYMBALTA) 30 MG capsule 30 capsule 1 4/10/2024 -- No   Sig - Route: Take 1 capsule (30 mg) by mouth daily Take with 60mg Capsule for a total of 90mg daily. - Oral   Last refill: 5/20/2024   DULoxetine (CYMBALTA) 60 MG capsule 30 capsule 1 4/10/2024 -- No   Sig - Route: Take 1 capsule (60 mg) by mouth daily Take with 30mg capsule for a total of 90mg daily. - Oral   Last refill: 5/20/2024   ------------------------------  From last visit note:   \"Increase duloxetine to 90 mg daily (30-day rx + 3 RF sent 02/14/24 )  \"       Refill decision: Refill pended and routed to the provider for review/determination due to the following criteria not met: Overdue for RTC, cancel x1, needs appointment scheduled for refills.   Scheduling has been notified to contact the pt for appointment.      Medication unable to be refilled by RN due to criteria not met as indicated.                 []Eligibility - not seen in the last year              [x]Supervision - no future appointment              [x]Compliance - no shows, cancellations or lapse in therapy              []Verification - order discrepancy              []Controlled medication              []Medication not included in policy              []90-day supply request              []Other:                        "

## 2024-06-20 ENCOUNTER — MYC REFILL (OUTPATIENT)
Dept: PSYCHIATRY | Facility: CLINIC | Age: 34
End: 2024-06-20

## 2024-06-20 DIAGNOSIS — F33.1 MAJOR DEPRESSIVE DISORDER, RECURRENT EPISODE, MODERATE (H): ICD-10-CM

## 2024-06-20 RX ORDER — DULOXETIN HYDROCHLORIDE 30 MG/1
30 CAPSULE, DELAYED RELEASE ORAL DAILY
Qty: 30 CAPSULE | Refills: 1 | Status: SHIPPED | OUTPATIENT
Start: 2024-06-20 | End: 2024-08-13

## 2024-06-20 RX ORDER — DULOXETIN HYDROCHLORIDE 60 MG/1
60 CAPSULE, DELAYED RELEASE ORAL DAILY
Qty: 30 CAPSULE | Refills: 1 | Status: SHIPPED | OUTPATIENT
Start: 2024-06-20 | End: 2024-08-19

## 2024-06-20 NOTE — TELEPHONE ENCOUNTER
Reached out to patient to schedule follow up visit with Dr. Wilkins. No answer, left detailed message for a call back.

## 2024-06-20 NOTE — TELEPHONE ENCOUNTER
Last seen: 2/2024  RTC: 3 months  Cancel: none  No-show: none  Next appt: not scheduled    Incoming refill from patient via [facility]    Medication requested: DULoxetine (CYMBALTA) 30 MG capsule and DULoxetine (CYMBALTA) 60 MG capsule   Directions: Take 1 capsule (60 mg) by mouth daily Take with 30mg capsule for a total of 90mg daily. - Oral   Qty: 30 capsules  Last refilled: 4/2024    Medication refill approved per refill protocol

## 2024-06-21 RX ORDER — DULOXETIN HYDROCHLORIDE 60 MG/1
60 CAPSULE, DELAYED RELEASE ORAL DAILY
Qty: 30 CAPSULE | Refills: 0 | Status: SHIPPED | OUTPATIENT
Start: 2024-06-21

## 2024-06-21 RX ORDER — DULOXETIN HYDROCHLORIDE 30 MG/1
30 CAPSULE, DELAYED RELEASE ORAL DAILY
Qty: 30 CAPSULE | Refills: 0 | Status: SHIPPED | OUTPATIENT
Start: 2024-06-21

## 2024-07-12 DIAGNOSIS — F33.1 MAJOR DEPRESSIVE DISORDER, RECURRENT EPISODE, MODERATE (H): Primary | ICD-10-CM

## 2024-07-16 RX ORDER — DEXTROAMPHETAMINE SACCHARATE, AMPHETAMINE ASPARTATE MONOHYDRATE, DEXTROAMPHETAMINE SULFATE AND AMPHETAMINE SULFATE 5; 5; 5; 5 MG/1; MG/1; MG/1; MG/1
20 CAPSULE, EXTENDED RELEASE ORAL DAILY
Qty: 30 CAPSULE | Refills: 0 | Status: SHIPPED | OUTPATIENT
Start: 2024-07-16

## 2024-07-18 DIAGNOSIS — F33.1 MAJOR DEPRESSIVE DISORDER, RECURRENT EPISODE, MODERATE (H): ICD-10-CM

## 2024-07-19 RX ORDER — DULOXETIN HYDROCHLORIDE 60 MG/1
60 CAPSULE, DELAYED RELEASE ORAL DAILY
Qty: 30 CAPSULE | Refills: 1 | OUTPATIENT
Start: 2024-07-19

## 2024-08-12 DIAGNOSIS — F33.1 MAJOR DEPRESSIVE DISORDER, RECURRENT EPISODE, MODERATE (H): ICD-10-CM

## 2024-08-13 RX ORDER — DULOXETIN HYDROCHLORIDE 30 MG/1
30 CAPSULE, DELAYED RELEASE ORAL DAILY
Qty: 30 CAPSULE | Refills: 0 | Status: SHIPPED | OUTPATIENT
Start: 2024-08-13 | End: 2024-09-14

## 2024-08-13 NOTE — TELEPHONE ENCOUNTER
"Date of Last Office Visit: 2/8/2024   [facility]      RTC: 2 months for 30 min. [facility]              -- Check-in with Nurse Paula Edgar in 1 month re: medication change and depression sx  No shows: 0  Cancellations: x1 - 7/25  Date of Next Office Visit:    10/17/2024 9:15 AM (30 min)  [facility]   VIDEO VISIT RETURN    [facility] ([facility])   Yessica Wilkins MD     ------------------------------    Incoming refill from Pharmacy via interface  Medication requested:    DULoxetine (CYMBALTA) 30 MG capsule 30 capsule 0 6/21/2024 -- No   Sig - Route: Take 1 capsule (30 mg) by mouth daily Take with 60mg Capsule for a total of 90mg daily. **overdue for follow-up visit, please schedule via [employer] or call the clinic** - Oral     ------------------------------  From last visit note:   \"-- Increase duloxetine to 90 mg daily (30-day rx + 3 RF sent 02/14/24 ) .  -- Continue Adderall XR 20 mg daily. (Refills x 3 months provided 11/30/23).  -- Start trazodone 50mg at bedtime for sleep ( 30-day rx + 3RF sent 2/08/24)\"         Refill decision: Refill pended and routed to the provider for review/determination due to the following criteria not met: Cxl x1    Medication unable to be refilled by RN due to criteria not met as indicated.                 []Eligibility - not seen in the last year              []Supervision - no future appointment              [x]Compliance - no shows, cancellations or lapse in therapy              []Verification - order discrepancy              []Controlled medication              []Medication not included in policy              []90-day supply request              []Other:                        "

## 2024-08-19 ENCOUNTER — MYC REFILL (OUTPATIENT)
Dept: PSYCHIATRY | Facility: CLINIC | Age: 34
End: 2024-08-19

## 2024-08-19 DIAGNOSIS — F33.1 MAJOR DEPRESSIVE DISORDER, RECURRENT EPISODE, MODERATE (H): ICD-10-CM

## 2024-08-19 RX ORDER — DULOXETIN HYDROCHLORIDE 60 MG/1
60 CAPSULE, DELAYED RELEASE ORAL DAILY
Qty: 30 CAPSULE | Refills: 1 | Status: SHIPPED | OUTPATIENT
Start: 2024-08-19

## 2024-08-19 NOTE — TELEPHONE ENCOUNTER
Last seen: 2/8/24  RTC: 2 months   Cancel: 7/25/24  No-show: none  Next appt: 10/17/24    Incoming refill from patient via mychart    Medication requested: duloxetine 60 mg   Directions: Take 1 capsule by mouth daily, take with 30 mg capsule for total of 90 mg daily   Qty: 30  Last refilled: 6/20/21 with one refill     Medication refill approved per refill protocol

## 2024-09-04 ENCOUNTER — PATIENT OUTREACH (OUTPATIENT)
Dept: CARE COORDINATION | Facility: CLINIC | Age: 34
End: 2024-09-04
Payer: COMMERCIAL

## 2024-09-08 NOTE — PROGRESS NOTES
"PSYCHIATRY CLINIC PROGRESS NOTE  30 minute medication management    IDENTIFICATION:  Saskia Ortega is a 26 year old female with previous psychiatric diagnoses of major depressive disorder, recurrent, generalized anxiety disorder, and ADHD.  Patient presents for ongoing psychiatric follow-up and was seen for initial evaluation on 5/04/2012.    SUBJECTIVE:  The patient was last seen in clinic on 12/27/2016 at which time no medication changes were made.  Since the time of the last visit:     Pt reports that she has been doing \"good\" since she was last seen. Got a position working at [employer] which is where she has always wanted to work. Started this position at the beginning of October.    Prior to this, she and boyfriend had a \"shake up.\" Describes being depressed since quitting her last job ~1 year ago. States that although she and Miles continued to live together, he was not supportive and very emotionally detached from her.    During this time, she began spending more time with another male friend of hers. She ended up cheating on Miles with this friend on Labor Day weekend. Miles then packed her stuff up for her and she moved back in with her mother. Despite her moving out from his house, they (she and Miles) continue to see each other. Feels that their having their own respective spaces has been very good for her and their relationship in general.     Discussed how boyfriend's daughter was recently diagnosed with ADHD. Processed her feelings about this given her own struggles with this diagnosis and hope that boyfriend's daughter will not repeat what she has been through.    Saskia describes struggling with elevated anxiety at night while trying to achieve sleep. States that she will begin ruminating on negative things as well as fears about catastrophic events that she feels she could not cope with (such as mother or dog dying). Discussed restarting lorazepam for sleep.     Denies other side effects to medication. Feels " "that current medication regimen is well managing mood and attention symptoms.    Symptoms:  Denies inattention in the afternoon. Denies sleep disruption. Ongoing infrequent low mood, anhedonia, fatigue, and sleep disturbance.  Denies  negative self concept, appetite disturbance, psychomotor slowing or concentration problems.  Denies suicidal ideation.  Ongoing periodic anxiety.   Medication side-effects:  Previously experienced fatigue, weight gain, and loss of libido associated with sertraline as well as fatigue and amotivation with higher dose citalopram.  Medical ROS:     Cardiovascular: negative for palpitations, tachycardia  Gastrointestinal: negative for increased appetite; negative for nausea, vomiting, constipation and diarrhea  Neurologic: negative for headaches and cognitive problems  Psychiatric: positive for sleep disturbance, increased stress, feeling anxious, negative for thoughts of self-harm, agitation and sexual difficulties.    MEDICAL TEAM:         - Primary Medical Provider:  unknown  - Therapist: none currently (previously followed by this provider for supportive psychotherapy)    ALLERGIES: NKDA    MEDICATIONS:  Adderall XR 20 mg daily  Adderall IR 10 mg qAfternoon  Duloxetine 60 mg daily   Lorazepam 0.5 mg daily PRN anxiety/sleep    LABS: no new results  VITALS: /76 (BP Location: Left arm, Patient Position: Chair, Cuff Size: Adult Regular)  Pulse 89  Ht 1.689 m (5' 6.5\")    OBJECTIVE:   Alert and oriented.  Well groomed, calm, cooperative with good eye contact.  No problems with speech or psychomotor behavior.  Mood was described as \"good.\" Affect was euthymic with improved brightness, congruent to speech content. Thought process was unremarkable and thought content was congruent to speech content, and devoid of suicidal and homicidal ideation and psychotic thought.  No hallucinations.  Insight was good.  Judgment was intact and adequate for safety.  Patient demonstrates no obvious " problems with attention, concentration, language, short or long term memory by observation of conversation.  These were not formally tested.  Fund of knowledge was intact.    ASSESSMENT:    Historical:  Saksia Ortega is a 25 year old  female with history of depression and anxiety who presents for follow-up medication management. She was transitioned from Effexor to Celexa in spring/summer 2013 with good results.  She was previously seen for monthly combination medication management and psychotherapy by her last provider.  She describes obtaining benefit from this and requested to increase frequency of sessions to every other week to work on processing family issues which have become more apparent after beginning a new relationship.  She continued to find benefit from Adderall for ADD and lorazepam as PRN for anxiety. She took them as prescribed and had no issues with substance abuse.  Pt has a history trials of Effexor and citalopram which were both effective for managing depression but discontinued secondary to side effects.  In addition, trial of Vyvanse was attempted during fall 2014, however, pt did not find it as effective as Adderall for management of ADD sx and so was transitioned back to Adderall.  At January 2015 visit, pt described significant worsening of sx of depression and cross-titration from citalopram to sertraline was initiated.  She tolerated transition well and initially felt mood was well managed at 150 mg daily.    Current:  Today, pt reports feeling that ADHD symptoms have recently been worse in the afternoon with increased distractibility, difficulty staying on task and with intrusive interrupting of people during conversation. After discussing possible contribution of anxiety, opted to increase afternoon dose of IR Adderall to 20 mg. Otherwise feels mood and anxiety are currently well managed.    For the future, may consider increasing dose of duloxetine to 90 mg vs trial of  escitalopram and/or bupropion.    The risks, benefits, alternatives and potential adverse effects have been explained and are understood by the patient.  The patient agrees to the plan with the capacity to do so.  The patient knows to call the clinic for any problems or access emergency care if needed. The patient is not pregnant.  She is not abusing substances and shows no evidence for abuse of medication.  Controlled substances are still appropriate and required.  No medical contraindications to treatment.    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 (Refills x 3 months provided today, 12/12/2017)  -- Continue duloxetine 60 mg daily.  (Refills x 3 months provided today, 12/12/2017).  -- Continue lorazepam 0.5 mg daily PRN anxiety/insomnia.  (Refills x 3 months provided today, 12/12/2017).  -- Continue Adderall XR 20 mg daily. ( Refills x 3 months provided today, 12/12/2017).  Psychotherapy:   Will continue with monthly combined psychotherapy and medication management.   RTC:  1 months for MFU/  Labs/Monitoring:    -- Recommend pt's weight NOT be checked prior to appointments.  -- Continue to monitor BP while on [facility]        PSYCHIATRY CLINIC INDIVIDUAL PSYCHOTHERAPY NOTE                                                   [16]   Start time: 4:10pm  End time: 4:55pm    Date reviewed: 12/12/2017       Date next due: 12/11/2018  Subjective: This supportive psychotherapy session addressed issues related to patient's history, current stressors, life stressors, relationships and work .  Patient's reaction: Preparatory in the context of mental status appropriate for ambulatory setting.  Progress: good  Plan: RTC 1  month  Psychotherapy services during this visit included  myself and Saskia Ortega.   TREATMENT  PLAN          SYMPTOMS; PROBLEMS   MEASURABLE GOALS;    FUNCTIONAL IMPROVEMENT INTERVENTIONS;   GAINS MADE DISCHARGE CRITERIA   Depression: depressed mood   report feeling more positive about self  strength focus marked symptom improvement   ADHD: inattention; Paying attention to details and Listening    develop strategies for thought distraction when ruminating strength focus marked symptom improvement         PROVIDER:  MD MITCHEL Mcmanus MD  [facility]  Department of Psychiatry     The patient is a 73y Female complaining of syncope.

## 2024-09-14 ENCOUNTER — MYC REFILL (OUTPATIENT)
Dept: PSYCHIATRY | Facility: CLINIC | Age: 34
End: 2024-09-14

## 2024-09-14 DIAGNOSIS — F33.1 MAJOR DEPRESSIVE DISORDER, RECURRENT EPISODE, MODERATE (H): ICD-10-CM

## 2024-09-16 RX ORDER — DULOXETIN HYDROCHLORIDE 30 MG/1
30 CAPSULE, DELAYED RELEASE ORAL DAILY
Qty: 30 CAPSULE | Refills: 0 | OUTPATIENT
Start: 2024-09-16

## 2024-09-16 RX ORDER — DULOXETIN HYDROCHLORIDE 30 MG/1
30 CAPSULE, DELAYED RELEASE ORAL DAILY
Qty: 30 CAPSULE | Refills: 0 | Status: SHIPPED | OUTPATIENT
Start: 2024-09-16

## 2024-09-16 NOTE — TELEPHONE ENCOUNTER
DULoxetine (CYMBALTA) 30 MG capsule 30 capsule 0 9/16/2024 -- No   Sig - Route: Take 1 capsule (30 mg) by mouth daily. Take with 60mg Capsule for a total of 90mg daily. - Oral     [id number] - [place], [place] - [address]

## 2024-09-16 NOTE — TELEPHONE ENCOUNTER
Last seen: 2/8/24  RTC: 2 months  Cancel: 7/25/24  No-show: none  Next appt: 10/17/24    Incoming refill from patient via mychart    Medication requested: Duloxetine 30 mg   Directions: Take 1 capsule by mouth daily with a 60 mg capusle for total of 90 mg   Qty: 30  Last refilled: 8/13/24    Medication refill approved per refill protocol

## 2024-09-18 ENCOUNTER — PATIENT OUTREACH (OUTPATIENT)
Dept: CARE COORDINATION | Facility: CLINIC | Age: 34
End: 2024-09-18
Payer: COMMERCIAL

## 2024-10-12 DIAGNOSIS — F33.1 MAJOR DEPRESSIVE DISORDER, RECURRENT EPISODE, MODERATE (H): ICD-10-CM

## 2024-10-15 RX ORDER — DULOXETIN HYDROCHLORIDE 30 MG/1
30 CAPSULE, DELAYED RELEASE ORAL DAILY
Qty: 30 CAPSULE | Refills: 2 | Status: SHIPPED | OUTPATIENT
Start: 2024-10-15

## 2024-10-15 RX ORDER — DULOXETIN HYDROCHLORIDE 60 MG/1
60 CAPSULE, DELAYED RELEASE ORAL DAILY
Qty: 30 CAPSULE | Refills: 2 | Status: SHIPPED | OUTPATIENT
Start: 2024-10-15

## 2024-10-15 NOTE — TELEPHONE ENCOUNTER
"Date of Last Office Visit: 2/8/2024   [facility]  Yessica Wilkins MD  Psychiatry    RTC: 2 months  No shows: 0  Cancellations: 1  Date of Next Office Visit:    10/17/2024 9:15 AM (30 min)  [facility]   VIDEO VISIT RETURN    [facility] ([facility])   Yessica Wilkins MD     ------------------------------    Medication requested:     Disp Refills Start End MEL   DULoxetine (CYMBALTA) 30 MG capsule 30 capsule 0 9/16/2024 -- No   Sig - Route: Take 1 capsule (30 mg) by mouth daily. Take with 60mg Capsule for a total of 90mg daily. - Oral     ------------------------------  From last visit note:   \"-- Increase duloxetine to 90 mg daily \"       Refill decision: Refill pended and routed to the provider for review/determination due to the following criteria not met:     Medication unable to be refilled by RN due to criteria not met as indicated.                 []Eligibility - not seen in the last year              []Supervision - no future appointment              [x]Compliance - no shows, cancellations or lapse in therapy              []Verification - order discrepancy              []Controlled medication              []Medication not included in policy              []90-day supply request              []Other:        Date of Last Office Visit: 2/8/2024   [facility]  Yessica Wilkins MD  Psychiatry    RTC: 2 months  No shows: 0  Cancellations: 1  Date of Next Office Visit:    10/17/2024 9:15 AM (30 min)  [facility]   VIDEO VISIT RETURN    [facility] ([facility])   Yessica Wilkins MD     ------------------------------    Medication requested:     Disp Refills Start End MEL   DULoxetine (CYMBALTA) 60 MG capsule 30 capsule 1 8/19/2024 -- No   Sig - Route: Take 1 capsule (60 mg) by mouth daily Take with 30mg capsule for a total of 90mg daily. - Oral     ------------------------------  From last visit note:   \"-- Increase duloxetine to 90 mg daily \"         Refill decision: Refill " pended and routed to the provider for review/determination due to the following criteria not met:     Medication unable to be refilled by RN due to criteria not met as indicated.                 []Eligibility - not seen in the last year              []Supervision - no future appointment              [x]Compliance - no shows, cancellations or lapse in therapy              []Verification - order discrepancy              []Controlled medication              []Medication not included in policy              []90-day supply request              []Other:

## 2024-10-16 ASSESSMENT — ANXIETY QUESTIONNAIRES
7. FEELING AFRAID AS IF SOMETHING AWFUL MIGHT HAPPEN: NOT AT ALL
GAD7 TOTAL SCORE: 3
6. BECOMING EASILY ANNOYED OR IRRITABLE: SEVERAL DAYS
IF YOU CHECKED OFF ANY PROBLEMS ON THIS QUESTIONNAIRE, HOW DIFFICULT HAVE THESE PROBLEMS MADE IT FOR YOU TO DO YOUR WORK, TAKE CARE OF THINGS AT HOME, OR GET ALONG WITH OTHER PEOPLE: SOMEWHAT DIFFICULT
GAD7 TOTAL SCORE: 3
5. BEING SO RESTLESS THAT IT IS HARD TO SIT STILL: NOT AT ALL
GAD7 TOTAL SCORE: 3
4. TROUBLE RELAXING: NOT AT ALL
7. FEELING AFRAID AS IF SOMETHING AWFUL MIGHT HAPPEN: NOT AT ALL
2. NOT BEING ABLE TO STOP OR CONTROL WORRYING: NOT AT ALL
3. WORRYING TOO MUCH ABOUT DIFFERENT THINGS: SEVERAL DAYS
1. FEELING NERVOUS, ANXIOUS, OR ON EDGE: SEVERAL DAYS
8. IF YOU CHECKED OFF ANY PROBLEMS, HOW DIFFICULT HAVE THESE MADE IT FOR YOU TO DO YOUR WORK, TAKE CARE OF THINGS AT HOME, OR GET ALONG WITH OTHER PEOPLE?: SOMEWHAT DIFFICULT

## 2024-10-16 ASSESSMENT — PATIENT HEALTH QUESTIONNAIRE - PHQ9
SUM OF ALL RESPONSES TO PHQ QUESTIONS 1-9: 3
SUM OF ALL RESPONSES TO PHQ QUESTIONS 1-9: 3
10. IF YOU CHECKED OFF ANY PROBLEMS, HOW DIFFICULT HAVE THESE PROBLEMS MADE IT FOR YOU TO DO YOUR WORK, TAKE CARE OF THINGS AT HOME, OR GET ALONG WITH OTHER PEOPLE: NOT DIFFICULT AT ALL

## 2024-10-17 ENCOUNTER — VIRTUAL VISIT (OUTPATIENT)
Dept: PSYCHIATRY | Facility: CLINIC | Age: 34
End: 2024-10-17
Payer: COMMERCIAL

## 2024-10-17 DIAGNOSIS — F41.1 GENERALIZED ANXIETY DISORDER: ICD-10-CM

## 2024-10-17 DIAGNOSIS — F33.1 MAJOR DEPRESSIVE DISORDER, RECURRENT EPISODE, MODERATE (H): Primary | ICD-10-CM

## 2024-10-17 DIAGNOSIS — F90.0 ATTENTION DEFICIT HYPERACTIVITY DISORDER (ADHD), PREDOMINANTLY INATTENTIVE TYPE: ICD-10-CM

## 2024-10-17 RX ORDER — DEXTROAMPHETAMINE SACCHARATE, AMPHETAMINE ASPARTATE MONOHYDRATE, DEXTROAMPHETAMINE SULFATE AND AMPHETAMINE SULFATE 5; 5; 5; 5 MG/1; MG/1; MG/1; MG/1
20 CAPSULE, EXTENDED RELEASE ORAL DAILY
Qty: 30 CAPSULE | Refills: 0 | Status: SHIPPED | OUTPATIENT
Start: 2024-12-16 | End: 2025-01-15

## 2024-10-17 RX ORDER — DEXTROAMPHETAMINE SACCHARATE, AMPHETAMINE ASPARTATE MONOHYDRATE, DEXTROAMPHETAMINE SULFATE AND AMPHETAMINE SULFATE 5; 5; 5; 5 MG/1; MG/1; MG/1; MG/1
20 CAPSULE, EXTENDED RELEASE ORAL DAILY
Qty: 30 CAPSULE | Refills: 0 | Status: SHIPPED | OUTPATIENT
Start: 2024-11-16 | End: 2024-12-16

## 2024-10-17 RX ORDER — DULOXETIN HYDROCHLORIDE 30 MG/1
30 CAPSULE, DELAYED RELEASE ORAL DAILY
Qty: 30 CAPSULE | Refills: 5 | Status: SHIPPED | OUTPATIENT
Start: 2024-10-17

## 2024-10-17 RX ORDER — DEXTROAMPHETAMINE SACCHARATE, AMPHETAMINE ASPARTATE MONOHYDRATE, DEXTROAMPHETAMINE SULFATE AND AMPHETAMINE SULFATE 5; 5; 5; 5 MG/1; MG/1; MG/1; MG/1
20 CAPSULE, EXTENDED RELEASE ORAL DAILY
Qty: 30 CAPSULE | Refills: 0 | Status: SHIPPED | OUTPATIENT
Start: 2024-10-17 | End: 2024-11-16

## 2024-10-17 RX ORDER — DULOXETIN HYDROCHLORIDE 60 MG/1
60 CAPSULE, DELAYED RELEASE ORAL DAILY
Qty: 30 CAPSULE | Refills: 5 | Status: SHIPPED | OUTPATIENT
Start: 2024-10-17

## 2024-10-17 NOTE — PROGRESS NOTES
"Virtual Visit Details    Type of service:  Video Visit     Originating Location (pt. Location): Home  {PROVIDER LOCATION On-site should be selected for visits conducted from your clinic location or adjoining [facility] hospital, academic office, or other nearby [facility] building. Off-site should be selected for all other provider locations, including home:[id number]}  Distant Location (provider location):  On-site  Platform used for Video Visit: [facility]  Psychiatry Clinic  PSYCHIATRIC PROGRESS NOTE       IDENTIFICATION:  Saskia Ortega is a 34 year old female with previous psychiatric diagnoses of major depressive disorder, recurrent, generalized anxiety disorder, and ADHD.  Patient presents for ongoing psychiatric follow-up and was seen for initial evaluation on 5/04/2021.    SUBJECTIVE:  The patient was last seen in clinic on 11/30/23 when no medication changes were made.      Since the time of the last visit:   -mood has been a little down having daughter due to perceived decrease in self worth, loss of identity, stopping work. Feels that others notice her struggling  -home life is going well. Poppy is a great sleeper  -felt anxious in public at [facility].   -not working provides time to feel guilty, pay attention to world politics  (feels anxious about [place])   -Support network is limited, not many peers, family is supportive.  -Sleep is a lot better with new mattress. Good in general. Trying to not sleep during the day, but has more over the past month - \"a reaction to my body feeling so overwhelmed\" and also feeling \"empty, like I dont have anything to do\"  -Appetite is good  -feeling anxious and like she cannot calm  -no disturbing thoughts regarding her or Poppy    Symptoms:  Denies inattention in the afternoon. Denies sleep disruption. Denies infrequent low mood, anhedonia, fatigue, self-derogatory thoughts, appetite disturbance, psychomotor slowing or concentration problems. " " Denies suicidal ideation.  Anxiety controlled, not impairing.   Medication side-effects:  Previously experienced fatigue, weight gain, and loss of libido associated with sertraline as well as fatigue and amotivation with higher dose citalopram.  Medical ROS:     Cardiovascular: negative for palpitations, tachycardia  Gastrointestinal: negative for increased appetite, nausea, vomiting, constipation and diarrhea  Neurologic: negative for headaches and cognitive problems  Psychiatric: negative for sleep disturbance, increased stress, feeling anxious, thoughts of self-harm, agitation and sexual difficulties.    MEDICAL TEAM:         - Primary Medical Provider:  unknown  - Therapist: none currently (previously followed by this provider for supportive psychotherapy)    ALLERGIES: NKDA    MEDICATIONS:  Adderall XR 20 mg daily  Adderall IR 10 mg qAfternoon  Duloxetine 60 mg daily   Lorazepam 0.5 mg daily PRN anxiety/sleep    LABS: no new results  VITALS: There were no vitals taken for this visit.    OBJECTIVE:   Alert and oriented.  Well groomed, calm, cooperative with good eye contact.  No problems with speech or psychomotor behavior.  Mood was described as \"very very good.\" Affect was euthymic with apparent brightness, congruent to speech content. Thought process was unremarkable and thought content was congruent to speech content, and devoid of suicidal and homicidal ideation and psychotic thought.  No hallucinations.  Insight was good.  Judgment was intact and adequate for safety.  Patient demonstrates no obvious problems with attention, concentration, language, short or long term memory by observation of conversation.  These were not formally tested.  Fund of knowledge was intact.    ASSESSMENT:    Historical:  Saskia Ortega is a 34 year old  female with history of depression and anxiety who presents for follow-up medication management. She was transitioned from Effexor to Celexa in spring/summer 2013 with good " results.  She was previously seen for monthly combination medication management and psychotherapy by her last provider.  She describes obtaining benefit from this and requested to increase frequency of sessions to every other week to work on processing family issues which have become more apparent after beginning a new relationship.  She continued to find benefit from Adderall for ADD and lorazepam as PRN for anxiety. She took them as prescribed and had no issues with substance abuse.  Pt has a history trials of Effexor and citalopram which were both effective for managing depression but discontinued secondary to side effects.  In addition, trial of Vyvanse was attempted during fall 2014, however, pt did not find it as effective as Adderall for management of ADD sx and so was transitioned back to Adderall.  At January 2015 visit, pt described significant worsening of sx of depression and cross-titration from citalopram to sertraline was initiated.  She tolerated transition well and initially felt mood was well managed at 150 mg daily.     Current:  Today, pt reports improvement in mood and having minimal symptoms of depression with lifestyle changes and further acceptance of her life with child. She did not increase duloxetine due to cost after last visit, but has done well nonetheless.  Will continue duloxetine at 60mg daily given mental health stability.    For the future, may consider increasing dose of duloxetine to 90 mg vs trial of escitalopram and/or bupropion should depression symptoms or anxiety worsen.    The risks, benefits, alternatives and potential adverse effects have been explained and are understood by the patient.  The patient agrees to the plan with the capacity to do so.  The patient knows to call the clinic for any problems or access emergency care if needed. The patient is not pregnant.  She is not abusing substances and shows no evidence for abuse of medication.  Controlled substances are  still appropriate and required.  No medical contraindications to treatment.    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  -- Continue Adderall XR 20 mg daily. (Refills x 3 months provided 11/30/23).  -- Start trazodone 50mg at bedtime for sleep ( 30-day rx + 3RF sent 2/08/24)  Psychotherapy:     -- Continue with combined supportive psychotherapy and medication management with this provider.   -- Referral for weekly individual psychotherapy through [facility]  RTC:  2 months for 30 min. MFU   -- Check-in with Nurse Paula Edgar in 1 month re: medication change and depression sx  Labs/Monitoring:    -- Recommend pt's weight NOT be checked prior to appointments.  -- Continue to monitor BP while on [facility]         Psychiatry Clinic Individual Psychotherapy Note                                                                     [16]     Start time: 9:12 AM        End time: 9:40 AM  Date last reviewed: 9/21/2023       Date next due: 9/20/2024     Subjective: This supportive psychotherapy session addressed issues related to orientation to therapy, goals of therapy, and current stressors consisting of current mood symptoms, work  and children .  Patient's reaction: Action in the context of mental status appropriate for ambulatory setting.  Progress: good  Plan: RTC 2 months  Psychotherapy services during this visit included myself and the patient.   Treatment Plan      SYMPTOMS; PROBLEMS   MEASURABLE GOALS;    FUNCTIONAL IMPROVEMENT INTERVENTIONS;   GAINS MADE DISCHARGE CRITERIA   Depression: anhedonia   find enjoyment at least once a day self-care skills  strength focus marked symptom improvement    Depression: depressed mood and feeling hopelesss   develop strategies for thought distraction when ruminating and reduce feeling overwhelmed/ improve decision making skills increase coping skills  strength focus  stress management marked symptom improvement     PROVIDER:  Yessica Wilkins MD    Level of Medical Decision Making:   - *HIGH ACUITY* - At least 1 chronic problem with severe exacerbation, progression, or side effects of treatment  - Moderate (or greater) risk of harm to self or others  - Functional impairment that could lead to serious consequences    {   Must meet 2 out of 3 of the above MDM elements to bill at the specified level     For help more info about elements / criteria, click here-> MDM Help Grid     **No need to delete blue text, it disappears when note is signed**      The longitudinal plan of care for Saskia was addressed during this visit. Due to the added complexity in care, I will continue to support Saskia in the subsequent management of this condition(s) and with the ongoing continuity of care of this condition(s).     Level of Medical Decision Making:   - At least 1 chronic problem that is not stable  - Engaged in prescription drug management during visit (discussed any medication benefits, side effects, alternatives, etc.)  Answers submitted by the patient for this visit:  Patient Health Questionnaire (Submitted on 10/16/2024)  If you checked off any problems, how difficult have these problems made it for you to do your work, take care of things at home, or get along with other people?: Not difficult at all  PHQ9 TOTAL SCORE: 3  Patient Health Questionnaire (G7) (Submitted on 10/16/2024)  ZANE 7 TOTAL SCORE: 3

## 2024-10-17 NOTE — PROGRESS NOTES
Saskia is a 34 year old who is being evaluated via a billable video visit.    How would you like to obtain your AVS? MyChart  If the video visit is dropped, the invitation should be resent by: Text to cell phone: [phone]  Will anyone else be joining your video visit? No      Video-Visit Details    Type of service:  Video Visit   Originating Location (pt. Location): Home    Distant Location (provider location):  On-site  Platform used for Video Visit: [facility]  Signed Electronically by: Yessica Wilkins MD     Answers submitted by the patient for this visit:  Patient Health Questionnaire (Submitted on 10/16/2024)  If you checked off any problems, how difficult have these problems made it for you to do your work, take care of things at home, or get along with other people?: Not difficult at all  PHQ9 TOTAL SCORE: 3  Patient Health Questionnaire (G7) (Submitted on 10/16/2024)  ZANE 7 TOTAL SCORE: 3

## 2024-10-17 NOTE — PROGRESS NOTES
"Virtual Visit Details    Type of service:  Video Visit     Originating Location (pt. Location): Home    Distant Location (provider location):  On-site  Platform used for Video Visit: Well             [facility]  Psychiatry Clinic  PSYCHIATRIC PROGRESS NOTE       IDENTIFICATION:  Saskia Ortega is a 34 year old female with previous psychiatric diagnoses of major depressive disorder, recurrent, generalized anxiety disorder, and ADHD. Patient presents for ongoing psychiatric follow-up and was seen for initial evaluation on 5/04/2021.    SUBJECTIVE:  The patient was last seen in clinic on 2/8/24 when duloxetine dose was increased from 60 mg to 90 mg and trazodone was started for sleep.      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 which has lead her spiraling in her head in the last 2 days  - Main concern is financial, she tends to prepare for the worst thing possible and do \"micro grieving\". Does this with his pet, with mom, or in the above instance with her job.   - It doesn't paralyze her but it runs in the back of her mind. It has started to physically impact her. Headaches, feeling tense in the shoulder and back. She has been trying to re frame that but sometimes feels sad about herself.   - Mood overall in the past 6 months has been stable, may at times feel irritable dependant on the environment stressors.  - Feels postpartum mood issues are resolved   - Jannet has started  in September.   - On the weekends procrastinates taking Adderall and when she doesn't she feels terrible because of not being able to get things done.   - Not taking trazodone anymore as it was making her feel stuffy.  - Denies any other questions or concerns      Symptoms:  Denies inattention in the afternoon. Denies sleep disruption. Denies infrequent low mood, " "anhedonia, fatigue, self-derogatory thoughts, appetite disturbance, psychomotor slowing or concentration problems.  Denies suicidal ideation.  Anxiety controlled, not impairing.   Medication side-effects:  Previously experienced fatigue, weight gain, and loss of libido associated with sertraline as well as fatigue and amotivation with higher dose citalopram.  Medical ROS:     Cardiovascular: negative for palpitations, tachycardia  Gastrointestinal: negative for increased appetite, nausea, vomiting, constipation and diarrhea  Neurologic: negative for headaches and cognitive problems  Psychiatric: negative for sleep disturbance, increased stress, feeling anxious, thoughts of self-harm, agitation and sexual difficulties.    MEDICAL TEAM:         - Primary Medical Provider:  unknown  - Therapist: none currently (previously followed by this provider for supportive psychotherapy)    ALLERGIES: NKDA    MEDICATIONS:  Adderall XR 20 mg daily  Adderall IR 10 mg qAfternoon  Duloxetine 60 mg daily   Lorazepam 0.5 mg daily PRN anxiety/sleep    LABS: no new results  VITALS: There were no vitals taken for this visit.    OBJECTIVE:   Alert and oriented.  Well groomed, calm, cooperative with good eye contact.  No problems with speech or psychomotor behavior.  Mood was described as \"very very good.\" Affect was euthymic with apparent brightness, congruent to speech content. Thought process was unremarkable and thought content was congruent to speech content, and devoid of suicidal and homicidal ideation and psychotic thought.  No hallucinations.  Insight was good.  Judgment was intact and adequate for safety.  Patient demonstrates no obvious problems with attention, concentration, language, short or long term memory by observation of conversation.  These were not formally tested.  Fund of knowledge was intact.    ASSESSMENT:    Historical:  Saskia Ortega is a 34 year old  female with history of depression and anxiety who " presents for follow-up medication management. She was transitioned from Effexor to Celexa in spring/summer 2013 with good results.  She was previously seen for monthly combination medication management and psychotherapy by her last provider.  She describes obtaining benefit from this and requested to increase frequency of sessions to every other week to work on processing family issues which have become more apparent after beginning a new relationship.  She continued to find benefit from Adderall for ADD and lorazepam as PRN for anxiety. She took them as prescribed and had no issues with substance abuse.  Pt has a history trials of Effexor and citalopram which were both effective for managing depression but discontinued secondary to side effects.  In addition, trial of Vyvanse was attempted during fall 2014, however, pt did not find it as effective as Adderall for management of ADD sx and so was transitioned back to Adderall.  At January 2015 visit, pt described significant worsening of sx of depression and cross-titration from citalopram to sertraline was initiated.  She tolerated transition well and initially felt mood was well managed at 150 mg daily.     Current:  Today, pt reports improvement in mood and having minimal symptoms of depression with lifestyle changes and further acceptance of her life with child. She did not increase duloxetine due to cost after last visit, but has done well nonetheless.  Will continue duloxetine at 60mg daily given mental health stability.    For the future, may consider increasing dose of duloxetine to 90 mg vs trial of escitalopram and/or bupropion should depression symptoms or anxiety worsen.    The risks, benefits, alternatives and potential adverse effects have been explained and are understood by the patient.  The patient agrees to the plan with the capacity to do so.  The patient knows to call the clinic for any problems or access emergency care if needed. The patient is  not pregnant.  She is not abusing substances and shows no evidence for abuse of medication.  Controlled substances are still appropriate and required.  No medical contraindications to treatment.    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  -- Continue Adderall XR 20 mg daily. (Refills x 3 months provided 10/17/23).  -- Continue trazodone 50mg at bedtime for sleep ( 30-day rx + 3RF sent 10/17/24)  Psychotherapy:     -- Continue with combined supportive psychotherapy and medication management with this provider.   -- Referral for weekly individual psychotherapy through [facility]    Labs/Monitoring:    -- Recommend pt's weight NOT be checked prior to appointments.  -- Continue to monitor BP while on Cymbalta    RTC:  3 months for 30 min. [facility] [place]       Psychiatry Clinic Individual Psychotherapy Note                                                                     [16]     Start time: 9:20 AM        End time: 9:38 AM  Date last reviewed: 9/21/2023       Date next due: 9/20/2024     Subjective: This supportive psychotherapy session addressed issues related to orientation to therapy, goals of therapy, and current stressors consisting of current mood symptoms, work  and children .  Patient's reaction: Action in the context of mental status appropriate for ambulatory setting.  Progress: good  Plan: RTC 3 months  Psychotherapy services during this visit included myself and the patient.   Treatment Plan      SYMPTOMS; PROBLEMS   MEASURABLE GOALS;    FUNCTIONAL IMPROVEMENT INTERVENTIONS;   GAINS MADE DISCHARGE CRITERIA   Depression: anhedonia   find enjoyment at least once a day self-care skills  strength focus  marked symptom improvement   Depression: depressed mood and feeling hopelesss   develop strategies for thought distraction when ruminating and reduce feeling overwhelmed/ improve decision making skills increase coping skills  strength focus  stress management marked symptom improvement     PROVIDER:  Yessica Wilkins MD    Level of Medical Decision Making:   - *HIGH ACUITY* - At least 1 chronic problem with severe exacerbation, progression, or side effects of treatment  - Moderate (or greater) risk of harm to self or others  - Functional impairment that could lead to serious consequences    {   Must meet 2 out of 3 of the above MDM elements to bill at the specified level     For help more info about elements / criteria, click here-> MDM Help Grid     **No need to delete blue text, it disappears when note is signed**      The longitudinal plan of care for Saskia was addressed during this visit. Due to the added complexity in care, I will continue to support Saskia in the subsequent management of this condition(s) and with the ongoing continuity of care of this condition(s).     Level of Medical Decision Making:   - At least 1 chronic problem that is not stable  - Engaged in prescription drug management during visit (discussed any medication benefits, side effects, alternatives, etc.)

## 2024-11-23 ENCOUNTER — HEALTH MAINTENANCE LETTER (OUTPATIENT)
Age: 34
End: 2024-11-23

## 2025-01-23 ENCOUNTER — VIRTUAL VISIT (OUTPATIENT)
Dept: PSYCHIATRY | Facility: CLINIC | Age: 35
End: 2025-01-23
Payer: COMMERCIAL

## 2025-01-23 DIAGNOSIS — F51.04 PSYCHOPHYSIOLOGICAL INSOMNIA: ICD-10-CM

## 2025-01-23 DIAGNOSIS — F90.0 ATTENTION DEFICIT HYPERACTIVITY DISORDER (ADHD), PREDOMINANTLY INATTENTIVE TYPE: ICD-10-CM

## 2025-01-23 DIAGNOSIS — Z86.59 HISTORY OF EATING DISORDER: ICD-10-CM

## 2025-01-23 DIAGNOSIS — F33.1 MAJOR DEPRESSIVE DISORDER, RECURRENT EPISODE, MODERATE (H): Primary | ICD-10-CM

## 2025-01-23 DIAGNOSIS — F41.1 GENERALIZED ANXIETY DISORDER: ICD-10-CM

## 2025-01-23 RX ORDER — DULOXETIN HYDROCHLORIDE 30 MG/1
30 CAPSULE, DELAYED RELEASE ORAL DAILY
Qty: 30 CAPSULE | Refills: 2 | Status: SHIPPED | OUTPATIENT
Start: 2025-01-23

## 2025-01-23 RX ORDER — DEXTROAMPHETAMINE SACCHARATE, AMPHETAMINE ASPARTATE MONOHYDRATE, DEXTROAMPHETAMINE SULFATE AND AMPHETAMINE SULFATE 5; 5; 5; 5 MG/1; MG/1; MG/1; MG/1
20 CAPSULE, EXTENDED RELEASE ORAL DAILY
Qty: 30 CAPSULE | Refills: 0 | Status: SHIPPED | OUTPATIENT
Start: 2025-03-24 | End: 2025-04-23

## 2025-01-23 RX ORDER — DULOXETIN HYDROCHLORIDE 60 MG/1
60 CAPSULE, DELAYED RELEASE ORAL DAILY
Qty: 30 CAPSULE | Refills: 2 | Status: SHIPPED | OUTPATIENT
Start: 2025-01-23

## 2025-01-23 RX ORDER — DEXTROAMPHETAMINE SACCHARATE, AMPHETAMINE ASPARTATE MONOHYDRATE, DEXTROAMPHETAMINE SULFATE AND AMPHETAMINE SULFATE 5; 5; 5; 5 MG/1; MG/1; MG/1; MG/1
20 CAPSULE, EXTENDED RELEASE ORAL DAILY
Qty: 30 CAPSULE | Refills: 0 | Status: SHIPPED | OUTPATIENT
Start: 2025-02-22 | End: 2025-03-24

## 2025-01-23 RX ORDER — DEXTROAMPHETAMINE SACCHARATE, AMPHETAMINE ASPARTATE MONOHYDRATE, DEXTROAMPHETAMINE SULFATE AND AMPHETAMINE SULFATE 5; 5; 5; 5 MG/1; MG/1; MG/1; MG/1
20 CAPSULE, EXTENDED RELEASE ORAL DAILY
Qty: 30 CAPSULE | Refills: 0 | Status: SHIPPED | OUTPATIENT
Start: 2025-01-23 | End: 2025-02-22

## 2025-01-23 ASSESSMENT — ANXIETY QUESTIONNAIRES
7. FEELING AFRAID AS IF SOMETHING AWFUL MIGHT HAPPEN: SEVERAL DAYS
GAD7 TOTAL SCORE: 8
8. IF YOU CHECKED OFF ANY PROBLEMS, HOW DIFFICULT HAVE THESE MADE IT FOR YOU TO DO YOUR WORK, TAKE CARE OF THINGS AT HOME, OR GET ALONG WITH OTHER PEOPLE?: SOMEWHAT DIFFICULT
5. BEING SO RESTLESS THAT IT IS HARD TO SIT STILL: NOT AT ALL
4. TROUBLE RELAXING: SEVERAL DAYS
6. BECOMING EASILY ANNOYED OR IRRITABLE: SEVERAL DAYS
2. NOT BEING ABLE TO STOP OR CONTROL WORRYING: MORE THAN HALF THE DAYS
3. WORRYING TOO MUCH ABOUT DIFFERENT THINGS: SEVERAL DAYS
7. FEELING AFRAID AS IF SOMETHING AWFUL MIGHT HAPPEN: SEVERAL DAYS
1. FEELING NERVOUS, ANXIOUS, OR ON EDGE: MORE THAN HALF THE DAYS
IF YOU CHECKED OFF ANY PROBLEMS ON THIS QUESTIONNAIRE, HOW DIFFICULT HAVE THESE PROBLEMS MADE IT FOR YOU TO DO YOUR WORK, TAKE CARE OF THINGS AT HOME, OR GET ALONG WITH OTHER PEOPLE: SOMEWHAT DIFFICULT

## 2025-01-23 ASSESSMENT — PATIENT HEALTH QUESTIONNAIRE - PHQ9
10. IF YOU CHECKED OFF ANY PROBLEMS, HOW DIFFICULT HAVE THESE PROBLEMS MADE IT FOR YOU TO DO YOUR WORK, TAKE CARE OF THINGS AT HOME, OR GET ALONG WITH OTHER PEOPLE: SOMEWHAT DIFFICULT
SUM OF ALL RESPONSES TO PHQ QUESTIONS 1-9: 2
SUM OF ALL RESPONSES TO PHQ QUESTIONS 1-9: 2

## 2025-01-23 NOTE — PROGRESS NOTES
Virtual Visit Details    Type of service:  Video Visit     Originating Location (pt. Location): Home    Distant Location (provider location):  On-site  Platform used for Video Visit: Well             [facility]  Psychiatry Clinic  PSYCHIATRIC PROGRESS NOTE       IDENTIFICATION:  Saskia Ortega is a 34 year old female with previous psychiatric diagnoses of major depressive disorder, recurrent, generalized anxiety disorder, and ADHD. Patient presents for ongoing psychiatric follow-up and was seen for initial evaluation on 5/04/2021.    SUBJECTIVE:  The patient was last seen in clinic on 10/10/2025 at which time no medication changes were made.      Since the time of the last visit:   Saskia reports feeling generally good but acknowledges significant stress and overthinking related to the current political events. She finds it increasingly difficult to reach a relaxed state, particularly over the past few weeks and months. Saskia experiences emotional distress, especially when thinking about her daughter, Poppy, and the pressures of parenting in an uncertain world. She has cried herself to sleep on several occasions, overwhelmed by thoughts about the future and the responsibilities of raising a child.    Describes her sleep as disrupted, averaging about five hours per night since the fall, which she attributes to stress. She discontinued trazodone after experiencing nasal congestion and does not wish to take sleep medications. She occasionally uses melatonin and magnesium to aid sleep. Despite the sleep issues, she feels she can generally keep going, although she acknowledges that the lack of sleep affects her mood at times.    Recently renewed her contract at [employer] for eight months and has been given a new, larger role. She expresses self-doubt about her ability to manage new responsibilities and recognizes challenges related to her neurodivergence, particularly in organization and  "procrastination. Despite these concerns, she feels grateful for her job and enjoys the work environment.    Continues on Cymbalta and Adderall, which she feels are managing her ADHD and mood well. She has not been exercising as much as she would like but continues to walk frequently. Overall, she feels that her mood is generally okay, despite the challenges she faces.      Symptoms:  Denies inattention in the afternoon. Denies sleep disruption. Denies infrequent low mood, anhedonia, fatigue, self-derogatory thoughts, appetite disturbance, psychomotor slowing or concentration problems.  Denies suicidal ideation.  Anxiety controlled, not impairing.   Medication side-effects:  Previously experienced fatigue, weight gain, and loss of libido associated with sertraline as well as fatigue and amotivation with higher dose citalopram.  Medical ROS:     Cardiovascular: negative for palpitations, tachycardia  Gastrointestinal: negative for increased appetite, nausea, vomiting, constipation and diarrhea  Neurologic: negative for headaches and cognitive problems  Psychiatric: negative for sleep disturbance, increased stress, feeling anxious, thoughts of self-harm, agitation and sexual difficulties.    MEDICAL TEAM:         - Primary Medical Provider:  unknown  - Therapist: none currently (previously followed by this provider for supportive psychotherapy)    ALLERGIES: NKDA    MEDICATIONS:  Adderall XR 20 mg daily  Duloxetine 90 mg daily     LABS: no new results  VITALS: There were no vitals taken for this visit.    OBJECTIVE:   Alert and oriented.  Well groomed, calm, cooperative with good eye contact.  No problems with speech or psychomotor behavior.  Mood was described as \"pretty good.\" Affect was euthymic with apparent brightness, congruent to speech content. Thought process was unremarkable and thought content was congruent to speech content, and devoid of suicidal and homicidal ideation and psychotic thought.  No " hallucinations.  Insight was good.  Judgment was intact and adequate for safety.  Patient demonstrates no obvious problems with attention, concentration, language, short or long term memory by observation of conversation.  These were not formally tested.  Fund of knowledge was intact.    ASSESSMENT:    Historical:  Saskia Ortega is a 34 year old  female with history of depression and anxiety who presents for follow-up medication management. She was transitioned from Effexor to Celexa in spring/summer 2013 with good results.  She was previously seen for monthly combination medication management and psychotherapy by her last provider.  She describes obtaining benefit from this and requested to increase frequency of sessions to every other week to work on processing family issues which have become more apparent after beginning a new relationship.  She continued to find benefit from Adderall for ADD and lorazepam as PRN for anxiety. She took them as prescribed and had no issues with substance abuse.  Pt has a history trials of Effexor and citalopram which were both effective for managing depression but discontinued secondary to side effects.  In addition, trial of Vyvanse was attempted during fall 2014, however, pt did not find it as effective as Adderall for management of ADD sx and so was transitioned back to Adderall.  At January 2015 visit, pt described significant worsening of sx of depression and cross-titration from citalopram to sertraline was initiated.  She tolerated transition well and initially felt mood was well managed at 150 mg daily.     Current:  Today, pt reports experiencing significant stress related to political events and personal responsibilities, particularly concerning parenting and future uncertainties. Reports of sleep disturbances, including difficulty achieving restful sleep, potentially exacerbated by stress and anxiety. Not using of trazodone due to adverse effects (nasal  congestion) and declines interest in pharmacological sleep aid. Suggested consideration of CBTi to assist with achieving better sleep. Continues to manage ADHD and stress with Cymbalta and Adderall. No changes today    The risks, benefits, alternatives and potential adverse effects have been explained and are understood by the patient.  The patient agrees to the plan with the capacity to do so.  The patient knows to call the clinic for any problems or access emergency care if needed. The patient is not pregnant.  She is not abusing substances and shows no evidence for abuse of medication.  Controlled substances are still appropriate and required.  No medical contraindications to treatment.    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 (Refills x 3 months provided 01/23/25).    Psychotherapy:     -- Continue with combined supportive psychotherapy and medication management with this provider.   -- Referral for weekly individual psychotherapy through [facility]    Labs/Monitoring:    -- Recommend pt's weight NOT be checked prior to appointments.  -- Continue to monitor BP while on Cymbalta    RTC:  4 months for 30 min. [facility] [place]       Psychiatry Clinic Individual Psychotherapy Note                                                                     [16]     Start time: 10:27 AM        End time: 10:45 AM  Date last reviewed: 01/23/25        Date next due: 01/22/26      Subjective: This supportive psychotherapy session addressed issues related to orientation to therapy, goals of therapy, and current stressors consisting of current mood symptoms, work  and children .  Patient's  reaction: Action in the context of mental status appropriate for ambulatory setting.  Progress: good  Plan: RTC 4 months  Psychotherapy services during this visit included myself and the patient.   Treatment Plan      SYMPTOMS; PROBLEMS   MEASURABLE GOALS;    FUNCTIONAL IMPROVEMENT INTERVENTIONS;   GAINS MADE DISCHARGE CRITERIA   Depression: anhedonia   find enjoyment at least once a day self-care skills  strength focus marked symptom improvement   Depression: depressed mood and feeling hopelesss   develop strategies for thought distraction when ruminating and reduce feeling overwhelmed/ improve decision making skills increase coping skills  strength focus  stress management marked symptom improvement     PROVIDER:  Yessica Wilkins MD      The longitudinal plan of care for Saskia was addressed during this visit. Due to the added complexity in care, I will continue to support Saskia in the subsequent management of this condition(s) and with the ongoing continuity of care of this condition(s).     Level of Medical Decision Making:   - At least 1 chronic problem that is not stable  - Engaged in prescription drug management during visit (discussed any medication benefits, side effects, alternatives, etc.)      Answers submitted by the patient for this visit:  Patient Health Questionnaire (Submitted on 1/23/2025)  If you checked off any problems, how difficult have these problems made it for you to do your work, take care of things at home, or get along with other people?: Somewhat difficult  PHQ9 TOTAL SCORE: 2  Patient Health Questionnaire (G7) (Submitted on 1/23/2025)  ZANE 7 TOTAL SCORE: 8

## 2025-01-23 NOTE — PROGRESS NOTES
Is the patient currently in the state of [place]? YES    Visit mode:VIDEO    If the visit is dropped, the patient can be reconnected by: VIDEO VISIT: Text to cell phone: [phone]    Will anyone else be joining the visit? NO      How would you like to obtain your AVS? MyChart    Are changes needed to the allergy or medication list? NO    Reason for visit: Follow Up       Answers submitted by the patient for this visit:  Patient Health Questionnaire (Submitted on 1/23/2025)  If you checked off any problems, how difficult have these problems made it for you to do your work, take care of things at home, or get along with other people?: Somewhat difficult  PHQ9 TOTAL SCORE: 2  Patient Health Questionnaire (G7) (Submitted on 1/23/2025)  ZANE 7 TOTAL SCORE: 8

## 2025-05-01 ENCOUNTER — VIRTUAL VISIT (OUTPATIENT)
Dept: PSYCHIATRY | Facility: CLINIC | Age: 35
End: 2025-05-01
Payer: COMMERCIAL

## 2025-05-01 DIAGNOSIS — Z86.59 HISTORY OF EATING DISORDER: ICD-10-CM

## 2025-05-01 DIAGNOSIS — F90.0 ATTENTION DEFICIT HYPERACTIVITY DISORDER (ADHD), PREDOMINANTLY INATTENTIVE TYPE: ICD-10-CM

## 2025-05-01 DIAGNOSIS — F41.1 GENERALIZED ANXIETY DISORDER: ICD-10-CM

## 2025-05-01 DIAGNOSIS — F33.1 MAJOR DEPRESSIVE DISORDER, RECURRENT EPISODE, MODERATE (H): Primary | ICD-10-CM

## 2025-05-01 DIAGNOSIS — F51.04 PSYCHOPHYSIOLOGICAL INSOMNIA: ICD-10-CM

## 2025-05-01 RX ORDER — DULOXETIN HYDROCHLORIDE 60 MG/1
60 CAPSULE, DELAYED RELEASE ORAL DAILY
Qty: 30 CAPSULE | Refills: 2 | Status: SHIPPED | OUTPATIENT
Start: 2025-05-01

## 2025-05-01 RX ORDER — DEXTROAMPHETAMINE SACCHARATE, AMPHETAMINE ASPARTATE MONOHYDRATE, DEXTROAMPHETAMINE SULFATE AND AMPHETAMINE SULFATE 5; 5; 5; 5 MG/1; MG/1; MG/1; MG/1
20 CAPSULE, EXTENDED RELEASE ORAL DAILY
Qty: 30 CAPSULE | Refills: 0 | Status: SHIPPED | OUTPATIENT
Start: 2025-05-31 | End: 2025-06-30

## 2025-05-01 RX ORDER — DEXTROAMPHETAMINE SACCHARATE, AMPHETAMINE ASPARTATE MONOHYDRATE, DEXTROAMPHETAMINE SULFATE AND AMPHETAMINE SULFATE 5; 5; 5; 5 MG/1; MG/1; MG/1; MG/1
20 CAPSULE, EXTENDED RELEASE ORAL DAILY
Qty: 30 CAPSULE | Refills: 0 | Status: SHIPPED | OUTPATIENT
Start: 2025-05-01 | End: 2025-05-31

## 2025-05-01 RX ORDER — DEXTROAMPHETAMINE SACCHARATE, AMPHETAMINE ASPARTATE MONOHYDRATE, DEXTROAMPHETAMINE SULFATE AND AMPHETAMINE SULFATE 5; 5; 5; 5 MG/1; MG/1; MG/1; MG/1
20 CAPSULE, EXTENDED RELEASE ORAL DAILY
Qty: 30 CAPSULE | Refills: 0 | Status: SHIPPED | OUTPATIENT
Start: 2025-06-30 | End: 2025-07-30

## 2025-05-01 ASSESSMENT — PATIENT HEALTH QUESTIONNAIRE - PHQ9: SUM OF ALL RESPONSES TO PHQ QUESTIONS 1-9: 5

## 2025-05-01 NOTE — PROGRESS NOTES
Video- Visit Details  Type of service:  video visit for medication management  Time of service:  Date:  05/01/2025  Video Start Time:  9:50 AM        Video End Time:  10:15 AM    Reason for telehealth visit:  Patient has requested telehealth visit  Originating Site (patient location):  [facility]   Location- Place of employment  Distant Site (provider location):  [facility]  Mode of Communication:  Telehealth Conference via [facility]  Psychiatry Clinic  PSYCHIATRIC PROGRESS NOTE       IDENTIFICATION:  Saskia Ortega is a 35 year old female with previous psychiatric diagnoses of major depressive disorder, recurrent, generalized anxiety disorder, and ADHD. Patient presents for ongoing psychiatric follow-up and was seen for initial evaluation on 5/04/2021.    SUBJECTIVE:  The patient was last seen in clinic on 1/23/2025 at which time she was experiencing significant stress related to political events and sleep disturbances, Not using of trazodone due to adverse effects (nasal congestion) and declines interest in pharmacological sleep aid. Suggested consideration of CBTi to assist with achieving better sleep. Continues to manage ADHD and stress with Cymbalta and Adderall. No changes at the time.     Since the time of the last visit:     Saskia reports that [employer] has been delaing with more changes at work secondary to recent tariffs set in place.  Notes that she has had some difficulties internalizaing effects of all the recent political changes.  Reports that anxiety has been high. Describes feeling anxious every day and relates this to concerns over the world her daughter will be living in. Also describes speaking with her mother regularly who is a source of support.  Not currently in therapy. Has had some reservations about initiating this. Will get referral for therapists and send to her.  Requests to lower dose of duloxetine to 60mg. Feels that she has had some  "emotional range restriction. Also describes negative impact on libido.  Reports that she is sleeping better. Has been taking magnesium for the past two weeks which has helped with sleep. Getting an average of 5-6 hours although has gotten 8 hours on a couple of nights in the  Also feels that appetite has been good.  Continues on Adderall daily (even on weekends).  Describes mood as being \"fine.\"         Symptoms:  Denies inattention in the afternoon. Some sleep disruption. Denies infrequent low mood, anhedonia, fatigue, self-derogatory thoughts, appetite disturbance, psychomotor slowing or concentration problems.  Denies suicidal ideation.  Some Anxiety, but controlled, not impairing.   Medication side-effects:  Previously experienced fatigue, weight gain, and loss of libido associated with sertraline as well as fatigue and amotivation with higher dose citalopram.  Medical ROS:     Cardiovascular: negative for palpitations, tachycardia  Gastrointestinal: negative for increased appetite, nausea, vomiting, constipation and diarrhea  Neurologic: negative for headaches and cognitive problems  Psychiatric: negative for sleep disturbance, increased stress, feeling anxious, thoughts of self-harm, agitation and sexual difficulties.    MEDICAL TEAM:         - Primary Medical Provider:  unknown  - Therapist: none currently (previously followed by this provider for supportive psychotherapy)    ALLERGIES: NKDA    MEDICATIONS:  Adderall XR 20 mg daily  Duloxetine 60 mg daily     LABS: no new results  VITALS: There were no vitals taken for this visit.    OBJECTIVE:   Alert and oriented.  Well groomed, calm, cooperative with good eye contact.  No problems with speech or psychomotor behavior.  Mood was described as \"pretty good.\" Affect was euthymic with apparent brightness, congruent to speech content. Thought process was unremarkable and thought content was congruent to speech content, and devoid of suicidal and homicidal " ideation and psychotic thought.  No hallucinations.  Insight was good.  Judgment was intact and adequate for safety.  Patient demonstrates no obvious problems with attention, concentration, language, short or long term memory by observation of conversation.  These were not formally tested.  Fund of knowledge was intact.       Psychiatric History    - Summary points of current care   05/1/2025 - decreased Cymbalta from 90mg to 60mg due to emotional numbing and decreased libido.     ASSESSMENT:         Historical:  Saskia Ortega is a 35 year old  female with history of depression and anxiety who presents for follow-up medication management. She was transitioned from Effexor to Celexa in spring/summer 2013 with good results.  She was previously seen for monthly combination medication management and psychotherapy by her last provider.  She describes obtaining benefit from this and requested to increase frequency of sessions to every other week to work on processing family issues which have become more apparent after beginning a new relationship.  She continued to find benefit from Adderall for ADD and lorazepam as PRN for anxiety. She took them as prescribed and had no issues with substance abuse.  Pt has a history trials of Effexor and citalopram which were both effective for managing depression but discontinued secondary to side effects.  In addition, trial of Vyvanse was attempted during fall 2014, however, pt did not find it as effective as Adderall for management of ADD sx and so was transitioned back to Adderall.  At January 2015 visit, pt described significant worsening of sx of depression and cross-titration from citalopram to sertraline was initiated.  She tolerated transition well and initially felt mood was well managed at 150 mg daily. Cymbalta was started and effective for managing depressive symptoms, at 90 mg caused emotional blunting and decrease in libido.     Current:  Today, experiencing some  anxiety with increase in work demands and recent political events.Some issues with sleep (average 5-6 hours/night), recommended CBT-I. Mood is stable with current medications, however reported decreased emotional range and decrease in libido and interested in decreasing Cymbalta to 60 mg. Explained potential risks such as worsening of symptoms. Plan is to decrease cymbalta to 60mg, with follow up in 4 weeks.Encouraged psychotherapy and she expressed interest. .       The risks, benefits, alternatives and potential adverse effects have been explained and are understood by the patient.  The patient agrees to the plan with the capacity to do so.  The patient knows to call the clinic for any problems or access emergency care if needed. The patient is not pregnant.  She is not abusing substances and shows no evidence for abuse of medication.  Controlled substances are still appropriate and required.  No medical contraindications to treatment.    DMS-5 DIAGNOSES:  Major depressive disorder, recurrent, moderate, in full remission (F33.42)  Generalized anxiety disorder (F41.1)  Attention deficit disorder, predominantly inattentive presentation, mild (F90.0)  Anorexia Nervosa, mild, in partial remission (F50.01)       PLAN:     Medications:   -- Decrease duloxetine from 90 to 60 mg daily (30-day rx + 2 RF sent 05/01/25)   -- Continue Adderall XR 20 mg daily. (Refills x 3 months provided 05/01/25).    Psychotherapy:     -- Continue with combined supportive psychotherapy and medication management with this provider.   -- Referral for weekly individual psychotherapy through [facility]    Labs/Monitoring:    -- Recommend pt's weight NOT be checked prior to appointments.  -- Continue to monitor BP while on Cymbalta    RTC:  4-6 weeks [ 5/29/25 at 11:15 AM] [facility]       Psychiatry Clinic Individual Psychotherapy Note                                                                     [16]     Start time: 9:50 AM        End time: 10:08  "AM  Date last reviewed: 01/23/25        Date next due: 01/22/26     Subjective: This supportive psychotherapy session addressed issues related to orientation to therapy, goals of therapy, and current stressors consisting of current mood symptoms, work  and children .  Patient's reaction: Action in the context of mental status appropriate for ambulatory setting.  Progress: good  Plan: RTC 4-6 weeks  Psychotherapy services during this visit included myself and the patient.   Treatment Plan      SYMPTOMS; PROBLEMS   MEASURABLE GOALS;    FUNCTIONAL IMPROVEMENT INTERVENTIONS;   GAINS MADE DISCHARGE CRITERIA   Depression: anhedonia   find enjoyment at least once a day self-care skills  strength focus marked symptom improvement   Depression: depressed mood and feeling hopelesss   develop strategies for thought distraction when ruminating and reduce feeling overwhelmed/ improve decision making skills increase coping skills  strength focus  stress management marked symptom improvement       Rex \"Jeanne\" MD Bernice  PGY 3, Psychiatry Resident     PROVIDER:  Yessica Wilkins MD      Attestation:  I, Yessica Wilkins MD,  have personally performed an examination of this patient on May 1, 2025 and I have reviewed the resident's documentation.  I have edited the note to reflect all relevant changes. I agree with the resident findings and plan in this resident note.  I have reviewed all vitals and laboratory findings.       Yessica Wilkins MD  [facility] Neuromodulation      The longitudinal plan of care for Saskia was addressed during this visit. Due to the added complexity in care, I will continue to support Saskia in the subsequent management of this condition(s) and with the ongoing continuity of care of this condition(s).     Level of Medical Decision Making:   - At least 1 chronic problem that is not stable  - Engaged in prescription drug management during visit " (discussed any medication benefits, side effects, alternatives, etc.)      Answers submitted by the patient for this visit:  Patient Health Questionnaire (Submitted on 1/23/2025)  If you checked off any problems, how difficult have these problems made it for you to do your work, take care of things at home, or get along with other people?: Somewhat difficult  PHQ9 TOTAL SCORE: 2  Patient Health Questionnaire (G7) (Submitted on 1/23/2025)  ZANE 7 TOTAL SCORE: 8

## 2025-05-01 NOTE — PROGRESS NOTES
Is the patient currently in the state of [place]? YES    Visit mode:VIDEO    If the visit is dropped, the patient can be reconnected by: VIDEO VISIT: Text to cell phone: [phone]    Will anyone else be joining the visit? NO      How would you like to obtain your AVS? MyChart    Are changes needed to the allergy or medication list? NO    Reason for visit: Follow Up     Virtual Visit Details    Type of service:  Video Visit     Originating Location (pt. Location): Home    Distant Location (provider location):  On-site  Platform used for Video Visit: Octaviano

## 2025-05-01 NOTE — PATIENT INSTRUCTIONS
"Today's Recommendations:  - Decrease Cymbalta to 60 mg po daily   - We refilled your medications.  - We recommend psychotherapy, we can make referral through our SW team.  - Follow up visit on 5/29 11:15AM    We are specifically a university and research clinic, and there are often research studies ongoing.     The \"Measurement Based Care\" research study is being conducted throughout the [facility] Department of Psychiatry and Behavioral Sciences. This provides some additional testing that might be diagnostically helpful or may help us to know what treatments are better suited to different mental health symptoms. This study will enable us how to better incorporate testing into clinical care.  More information about that is at: [email or web address]   If you are interested in the study you can email, [email or web address].     If you do not want to be contacted about research, please let us know. (Being called does not mean you have to be in a study.)     Our clinic is also conducting clinical trials of new brain stimulation treatments. Other studies are what we would call \"biomarker\" studies, where we ask you to participate in some kind of measurement while you are undergoing regular treatment. You may be called by one of our clinical research coordinators about these studies.       General Information:  Our Treatment-Resistant Disorders/Interventional Psychiatry clinic is what is often called a \"consultation\" or \"tertiary care\" clinic. That means we do not see patients long-term for medication management or talk therapy. We offer thorough evaluations, recommendations about medications/therapies you may have not yet tried, and in some cases, brain stimulation or office-based treatments. If you are likely to benefit from one of those advanced treatments, we will have talked about it today. Once that treatment is complete, we will see you once or twice afterwards to " check in, and then you will return to getting ongoing psychiatric care from whomever you were seeing before you came for your evaluation with us. If for some reason you no longer have access to that clinician, we can help with a referral to our main [facility]. The only patients we see long-term are patients with implanted medical devices that require ongoing monitoring and programming.     Our recommendations almost always include some kind of cognitive-behavioral therapy (CBT) if you are not getting it already. Brain and nerve stimulation treatments work best when combined with certain talk therapies. We make these recommendations because we strongly believe that, without the therapy piece, most people will not get better, or will have limited clinical benefit. It is often difficult or inconvenient to add therapy to an already busy schedule, but it is also necessary.    It is important to remember that, like all mental health treatments, our interventional therapies are not perfect. About one third of people will not feel better after treatment, and even when they work, they do not take away the symptoms entirely.If we have recommended something above, it means we think there is a better than even chance of it working, but things are never guaranteed. This is another reason we usually recommend CBT along with our advanced treatments -- it can address the symptoms that remain after the stimulation/ketamine treatment.       While we are waiting to implement the recommendations you and I have discussed, you should know what to do if your symptoms worsen. A variety of crisis numbers/resources are available. They include:    CRISIS GENERAL NUMBERS   0-710-AWUZBQM (1-652.705.9860)  OR  918     CRISIS INTERVENTION TEAM (CIT) - this is a POLICE UNIT, specially trained.  This website has information for all of [place]'s CITs. Lays out which areas have this  team.  [email or web address]  However, one can just call 911 and ask for this special team.   If police need to be called, DO ask for this team.    CRISIS MOBILE TEAMS  [and see end of this phrase*]  [facility] -COPE: 24hrs/7days:    [phone]  (Adults > 17yo)    [phone]  (Child   < 16yo)                                       FRONT DOOR (during the day could call)   [phone]    [facility] -[phone] (Adult)  [phone] (Child)     [facility] -[phone] (Adult and Child)     [facility] -[phone] ([employer] mobile crisis team; Adult and Child; 24hr)     [facility] -[phone] (Adult and Child)     CRISIS HOUSING  [facility] Res[facility]                           [address]              [phone]  [facility]                                [address]                       [phone]  [facility]                               [address]     [phone]   [facility] Crisis Residence  [address]                [phone]    [place] EMERGENCY NUMBERS    Crisis Connection:                                [phone]  [facility]:     [phone]  Crisis Intervention:                                [phone] or [phone]   COPE: Mobile Team 24hrs/7days:    [phone]  (Adults > 17yo)                                                                            [phone]  (Child   < 16yo)  [facility]        [phone] 24/7 line and Mobile Team   [address] [place] [address]  DROP IN:  M-F: 8:00 am - 7:00 pm  Sat: 11:00 am - 3:00 pm   Sun: Closed     WALK-IN COUNSELING:  [facility]       [phone]    [address]:   M, W, F:  1:00-3:00PM    M-Th:  6:30-8:30PM    TRANS and LGBT  Call Trans  "Lifeline at [phone]. This service is staffed by trans people .   LGBT youth <24 contemplating suicide, call the [employer] Lifeline [phone].    POISON CONTROL CENTER  [phone]    OR  go to nearest ER    CHILD  \"[facility] has a needs assessment team who will do an intake evaluation. Based on the results of the intake they will recommended inpatient admission, partial hospitalization, intensive outpatient or outpatient care. The number is [phone]. \"    CRISIS TEXT LINE  Http://www.crisistextline.org  FREE SUPPORT AT YOUR FINGERTIPS,   Crisis Text Line serves anyone, in any type of crisis, providing access to free,  support and information via the medium people already use and trust: text. Here s how it works:  1)  Text [phone] from anywhere in the [place], anytime, about any type of crisis.  2)  A live, trained Crisis Counselor receives the text and responds quickly.      The volunteer Crisis Counselor will help you move from a 'hot moment to a cool moment'    [facility] EMERGENCY NUMBERS    Crisis Connection:                                [phone]  [facility]:              [phone]  Crisis Intervention:                                [phone] or [phone]   COPE: Mobile Team 24hrs/7days:    [phone]  (Adults > 19yo)                                                                            [phone]  (Child   < 18yo)  [facility]        [phone]  CALL 24 hours a day.  [address] [place] [address]  DROP IN:  M-F: 8:00 am - 7:00 pm  Sat: 11:00 am - 3:00 pm   Sun: Closed    WALK-IN COUNSELIN)  [facility]                                  [phone]        [facility] [address] [place] [address] Ave:                  [place], W:      5:00-7:00PM       2)  [facility]                            [phone]        [place], [address] [facility]                T, Th:      6:00-8:00PM    TRANS and LGBT  Call " "[employer] at [phone]. This service is staffed by trans people 24/7.   LGBT youth <24 contemplating suicide, call the [employer] Lifeline 0-191-5172.    POISON CONTROL CENTER  [phone]    OR  go to nearest ER    CHILD  \"[facility] has a needs assessment team who will do an intake evaluation. Based on the results of the intake they will recommended inpatient admission, partial hospitalization, intensive outpatient or outpatient care. The number is [phone] or [phone]. \"    CRISIS TEXT LINE  Http://www.crisistextline.org  FREE SUPPORT AT YOUR FINGERTIPS, 24/7  Crisis Text Line serves anyone, in any type of crisis, providing access to free, 24/7 support and information via the medium people already use and trust: text. Here s how it works:  1)  Text [phone] from anywhere in the [place], anytime, about any type of crisis.  2)  A live, trained Crisis Counselor receives the text and responds quickly.      The volunteer Crisis Counselor will help you move from a 'hot moment to a cool moment'      * A Community Paramedic (CP) is an advanced paramedic that works to increase access to primary and preventive care and decrease use of emergency departments, which in turn decreases health care costs. Among other things, CPs may play a key role in providing follow-up services after a hospital discharge to prevent hospital readmission. CPs can provide health assessments, chronic disease monitoring and education, medication management, immunizations and vaccinations, laboratory specimen collection, hospital discharge follow-up care and minor medical procedures. CPs work under the direction of an Ambulance Medical Director.    "

## 2025-05-29 ENCOUNTER — VIRTUAL VISIT (OUTPATIENT)
Dept: PSYCHIATRY | Facility: CLINIC | Age: 35
End: 2025-05-29
Payer: COMMERCIAL

## 2025-05-29 DIAGNOSIS — F32.9 MAJOR DEPRESSIVE DISORDER WITH CURRENT ACTIVE EPISODE, UNSPECIFIED DEPRESSION EPISODE SEVERITY, UNSPECIFIED WHETHER RECURRENT: ICD-10-CM

## 2025-05-29 DIAGNOSIS — F33.1 MAJOR DEPRESSIVE DISORDER, RECURRENT EPISODE, MODERATE (H): ICD-10-CM

## 2025-05-29 DIAGNOSIS — Z86.59 HISTORY OF EATING DISORDER: ICD-10-CM

## 2025-05-29 DIAGNOSIS — F41.1 GENERALIZED ANXIETY DISORDER: ICD-10-CM

## 2025-05-29 DIAGNOSIS — F90.0 ATTENTION DEFICIT HYPERACTIVITY DISORDER (ADHD), PREDOMINANTLY INATTENTIVE TYPE: ICD-10-CM

## 2025-05-29 DIAGNOSIS — E55.9 VITAMIN D DEFICIENCY: Primary | ICD-10-CM

## 2025-05-29 ASSESSMENT — ANXIETY QUESTIONNAIRES
GAD7 TOTAL SCORE: 4
GAD7 TOTAL SCORE: 4
8. IF YOU CHECKED OFF ANY PROBLEMS, HOW DIFFICULT HAVE THESE MADE IT FOR YOU TO DO YOUR WORK, TAKE CARE OF THINGS AT HOME, OR GET ALONG WITH OTHER PEOPLE?: SOMEWHAT DIFFICULT
7. FEELING AFRAID AS IF SOMETHING AWFUL MIGHT HAPPEN: NOT AT ALL
6. BECOMING EASILY ANNOYED OR IRRITABLE: SEVERAL DAYS
IF YOU CHECKED OFF ANY PROBLEMS ON THIS QUESTIONNAIRE, HOW DIFFICULT HAVE THESE PROBLEMS MADE IT FOR YOU TO DO YOUR WORK, TAKE CARE OF THINGS AT HOME, OR GET ALONG WITH OTHER PEOPLE: SOMEWHAT DIFFICULT
7. FEELING AFRAID AS IF SOMETHING AWFUL MIGHT HAPPEN: NOT AT ALL
5. BEING SO RESTLESS THAT IT IS HARD TO SIT STILL: NOT AT ALL
3. WORRYING TOO MUCH ABOUT DIFFERENT THINGS: SEVERAL DAYS
2. NOT BEING ABLE TO STOP OR CONTROL WORRYING: SEVERAL DAYS
4. TROUBLE RELAXING: NOT AT ALL
GAD7 TOTAL SCORE: 4
1. FEELING NERVOUS, ANXIOUS, OR ON EDGE: SEVERAL DAYS

## 2025-05-29 ASSESSMENT — PATIENT HEALTH QUESTIONNAIRE - PHQ9
SUM OF ALL RESPONSES TO PHQ QUESTIONS 1-9: 0
SUM OF ALL RESPONSES TO PHQ QUESTIONS 1-9: 0
10. IF YOU CHECKED OFF ANY PROBLEMS, HOW DIFFICULT HAVE THESE PROBLEMS MADE IT FOR YOU TO DO YOUR WORK, TAKE CARE OF THINGS AT HOME, OR GET ALONG WITH OTHER PEOPLE: NOT DIFFICULT AT ALL

## 2025-05-29 NOTE — PROGRESS NOTES
"Video- Visit Details  Type of service:  video visit for medication management  Time of service:  Date:  May 29, 2025   Video Start Time:  11:15 AM        Video End Time:  11:45 AM    Reason for telehealth visit:  Patient has requested telehealth visit  Originating Site (patient location):  [facility] [place]   Location- Place of employment  Distant Site (provider location):  [facility]  Mode of Communication:  Telehealth Conference via [facility]  Psychiatry Clinic  PSYCHIATRIC PROGRESS NOTE       IDENTIFICATION:  Saskia Ortega is a 35 year old female with previous psychiatric diagnoses of major depressive disorder, recurrent, generalized anxiety disorder, and ADHD. Patient presents for ongoing psychiatric follow-up and was seen for initial evaluation on 5/04/2021.    SUBJECTIVE:    The patient was last seen in clinic on 05/1/2025 at which time she was experiencing some anxiety with increase in work demands and recent political events.Some issues with sleep (average 5-6 hours/night), recommended CBT-I. Mood is stable with current medications, however reported decreased emotional range and decrease in libido,Cymbalta decreased from 90mg to 60 mg.     Since the time of the last visit:     Saskia reports that she has been doing \"really great.\"   Regarding mood, she has some difficulty knowing how to gauge this. For this reason, prefers to focus on how irritable she is. Provides examples of when she feels irritable with teenage step-daughter or with toddler daughter.  Overall, is feeling \"better\" since reducing dose of duloxetine. Elaborates that higher dose of duloxetine put her into a \"functional freeze.\" Felt that her days were \"always the same\" and that she felt \"emotionally flat.\"  Expresses some concern that she may be too emotionally animated/expressive when talking about topics that are of interest to her. Has noted that her daughter will sometimes react negatively " "to her emotional expressions.  Reports that sleep has gotten much better. She has made some changes including leaving her phone outside of her bedroom on weekdays. She is also taking a magnesium supplement which has helped her to feel more relaxed before sleeping.   Has also been using B12 and folate patch and may have also been using NAD+ patches.  Discussed starting individual psychotherapy with Dr. Hathaway.  Dsicussed how she has been taking an Elvis supplement with vitamin D as well as an Elvis supplement with iron.      Symptoms:  Denies inattention in the afternoon. Some sleep disruption. Denies infrequent low mood, anhedonia, fatigue, self-derogatory thoughts, appetite disturbance, psychomotor slowing or concentration problems.  Denies suicidal ideation.  Some Anxiety, but controlled, not impairing.   Medication side-effects:  Previously experienced fatigue, weight gain, and loss of libido associated with sertraline as well as fatigue and amotivation with higher dose citalopram.  Medical ROS:     Cardiovascular: negative for palpitations, tachycardia  Gastrointestinal: negative for increased appetite, nausea, vomiting, constipation and diarrhea  Neurologic: negative for headaches and cognitive problems  Psychiatric: negative for sleep disturbance, increased stress, feeling anxious, thoughts of self-harm, agitation and sexual difficulties.    MEDICAL TEAM:         - Primary Medical Provider:  none at this time    - Therapist: none currently (previously followed by this provider for supportive psychotherapy)    ALLERGIES: NKDA    MEDICATIONS:  Adderall XR 20 mg daily  Duloxetine 60 mg daily     LABS: no new results  VITALS: There were no vitals taken for this visit.    OBJECTIVE:   Alert and oriented.  Well groomed, calm, cooperative with good eye contact.  No problems with speech or psychomotor behavior.  Mood was described as \"pretty good.\" Affect was euthymic with apparent brightness, congruent to speech content. " Thought process was unremarkable and thought content was congruent to speech content, and devoid of suicidal and homicidal ideation and psychotic thought.  No hallucinations.  Insight was good.  Judgment was intact and adequate for safety.  Patient demonstrates no obvious problems with attention, concentration, language, short or long term memory by observation of conversation.  These were not formally tested.  Fund of knowledge was intact.       Psychiatric History    - Summary points of current care   05/1/2025 - decreased Cymbalta from 90mg to 60mg due to emotional numbing and decreased libido.   05/29/2025- no changes in medications. Vitamin D levels ordered.    ASSESSMENT:         Historical:  Saskia Ortega is a 35 year old  female with history of depression and anxiety who presents for follow-up medication management. She was transitioned from Effexor to Celexa in spring/summer 2013 with good results.  She was previously seen for monthly combination medication management and psychotherapy by her last provider.  She describes obtaining benefit from this and requested to increase frequency of sessions to every other week to work on processing family issues which have become more apparent after beginning a new relationship.  She continued to find benefit from Adderall for ADD and lorazepam as PRN for anxiety. She took them as prescribed and had no issues with substance abuse.  Pt has a history trials of Effexor and citalopram which were both effective for managing depression but discontinued secondary to side effects.  In addition, trial of Vyvanse was attempted during fall 2014, however, pt did not find it as effective as Adderall for management of ADD sx and so was transitioned back to Adderall.  At January 2015 visit, pt described significant worsening of sx of depression and cross-titration from citalopram to sertraline was initiated.  She tolerated transition well and initially felt mood was well  managed at 150 mg daily. Cymbalta was started and effective for managing depressive symptoms, at 90 mg caused emotional blunting and decrease in libido.     Current: Overall, feeling better after reducing Cymbalta to 60 mg, noting improvements in emotional numbing and decreased libido. Mood is good and stable. She described longstanding irritability and sensitivity to noise, light, and others' behavior, which she views as part of her baseline temperament rather than a medication effect. Sleep and appetite are stable. She is currently taking MaryRuth magnesium citrate gummies at night, along with vitamin D,and iron gummies, and vitamin B12, and NAD+ skin patches. We discussed the potential for overconsumption of gummies and the risk of toxicity from fat-soluble vitamins; checking vitamin D and iron levels was recommended. Recommended to connect with a PCP and expressed interest in establishing care at [facility]. Psychotherapy will be initiated at [facility].    The risks, benefits, alternatives and potential adverse effects have been explained and are understood by the patient.  The patient agrees to the plan with the capacity to do so.  The patient knows to call the clinic for any problems or access emergency care if needed. The patient is not pregnant.  She is not abusing substances and shows no evidence for abuse of medication.  Controlled substances are still appropriate and required.  No medical contraindications to treatment.    DMS-5 DIAGNOSES:  Major depressive disorder, recurrent, moderate, in full remission (F33.42)  Generalized anxiety disorder (F41.1)  Attention deficit disorder, predominantly inattentive presentation, mild (F90.0)  Anorexia Nervosa, mild, in partial remission (F50.01)       PLAN:     Medications:   -- Continue duloxetine 60 mg daily (30-day rx + 2 RF sent 05/01/25)   -- Continue Adderall XR 20 mg daily. (Refills x 3 months provided 05/01/25).    Psychotherapy:     -- Continue with  "combined supportive psychotherapy and medication management with this provider.   -- Referral for weekly individual psychotherapy at [facility]    Labs/Monitoring:    -- Vitamin D levels ordered.   -- Recommend pt's weight NOT be checked prior to appointments.  -- Continue to monitor BP while on Cymbalta  -- Referral to PCP    RTC:  4 months - [facility]       Psychiatry Clinic Individual Psychotherapy Note                                                                     [16]     Start time: 11:20 AM        End time: 11:38 AM  Date last reviewed: 01/23/25        Date next due: 01/22/26     Subjective: This supportive psychotherapy session addressed issues related to orientation to therapy, goals of therapy, and current stressors consisting of current mood symptoms, work  and children .  Patient's reaction: Action in the context of mental status appropriate for ambulatory setting.  Progress: good  Plan: RTC 4 months  Psychotherapy services during this visit included myself and the patient.   Treatment Plan      SYMPTOMS; PROBLEMS   MEASURABLE GOALS;    FUNCTIONAL IMPROVEMENT INTERVENTIONS;   GAINS MADE DISCHARGE CRITERIA   Depression: anhedonia   find enjoyment at least once a day self-care skills  strength focus marked symptom improvement   Depression: depressed mood and feeling hopelesss   develop strategies for thought distraction when ruminating and reduce feeling overwhelmed/ improve decision making skills increase coping skills  strength focus  stress management marked symptom improvement       Rex \"Jeanne\" MD Bernice  PGY 3, Psychiatry Resident     PROVIDER:  Yessica Wilkins MD    Attestation:  I, Yessica Wilkins MD,  have personally performed an examination of this patient on May 29, 2025  and I have reviewed the resident's documentation.  I have edited the note to reflect all relevant changes. I agree with the resident findings and plan in this resident note.  I have reviewed all vitals and " laboratory findings.       Yessica Wilkins MD  [facility]od[facility]      The longitudinal plan of care for Saskia was addressed during this visit. Due to the added complexity in care, I will continue to support Saskia in the subsequent management of this condition(s) and with the ongoing continuity of care of this condition(s).     Level of Medical Decision Making:   - At least 1 chronic problem that is not stable  - Engaged in prescription drug management during visit (discussed any medication benefits, side effects, alternatives, etc.)      Answers submitted by the patient for this visit:  Patient Health Questionnaire (Submitted on 5/29/2025)  If you checked off any problems, how difficult have these problems made it for you to do your work, take care of things at home, or get along with other people?: Not difficult at all  PHQ9 TOTAL SCORE: 0  Patient Health Questionnaire (G7) (Submitted on 5/29/2025)  ZANE 7 TOTAL SCORE: 4

## 2025-05-29 NOTE — NURSING NOTE
Is the patient currently in the state of [place]? YES - Is there an order to remove in 48 hours: No, if no order for removal discuss with MD to remove.    Visit mode:VIDEO    If the visit is dropped, the patient can be reconnected by: VIDEO VISIT: Text to cell phone: [phone]    Will anyone else be joining the visit? NO      How would you like to obtain your AVS? MyChart    Are changes needed to the allergy or medication list? NO    Reason for visit: Follow Up      Virtual Visit Details    Type of service:  Video Visit     Originating Location (pt. Location): Other Work    Distant Location (provider location):  On-site  Platform used for Video Visit: Octaviano    
no

## 2025-05-30 NOTE — PATIENT INSTRUCTIONS
"Today's Recommendations:  - We recommend connecting with primary care provider.   - We ordered vitamin D level.       We are specifically a university and research clinic, and there are often research studies ongoing.     The \"Measurement Based Care\" research study is being conducted throughout the [facility] Department of Psychiatry and Behavioral Sciences. This provides some additional testing that might be diagnostically helpful or may help us to know what treatments are better suited to different mental health symptoms. This study will enable us how to better incorporate testing into clinical care.  More information about that is at: [email or web address]   If you are interested in the study you can email, [email or web address].     If you do not want to be contacted about research, please let us know. (Being called does not mean you have to be in a study.)     Our clinic is also conducting clinical trials of new brain stimulation treatments. Other studies are what we would call \"biomarker\" studies, where we ask you to participate in some kind of measurement while you are undergoing regular treatment. You may be called by one of our clinical research coordinators about these studies.       General Information:  Our Treatment-Resistant Disorders/Interventional Psychiatry clinic is what is often called a \"consultation\" or \"tertiary care\" clinic. That means we do not see patients long-term for medication management or talk therapy. We offer thorough evaluations, recommendations about medications/therapies you may have not yet tried, and in some cases, brain stimulation or office-based treatments. If you are likely to benefit from one of those advanced treatments, we will have talked about it today. Once that treatment is complete, we will see you once or twice afterwards to check in, and then you will return to getting ongoing psychiatric care from whomever you were " seeing before you came for your evaluation with us. If for some reason you no longer have access to that clinician, we can help with a referral to our main [facility]. The only patients we see long-term are patients with implanted medical devices that require ongoing monitoring and programming.     Our recommendations almost always include some kind of cognitive-behavioral therapy (CBT) if you are not getting it already. Brain and nerve stimulation treatments work best when combined with certain talk therapies. We make these recommendations because we strongly believe that, without the therapy piece, most people will not get better, or will have limited clinical benefit. It is often difficult or inconvenient to add therapy to an already busy schedule, but it is also necessary.    It is important to remember that, like all mental health treatments, our interventional therapies are not perfect. About one third of people will not feel better after treatment, and even when they work, they do not take away the symptoms entirely.If we have recommended something above, it means we think there is a better than even chance of it working, but things are never guaranteed. This is another reason we usually recommend CBT along with our advanced treatments -- it can address the symptoms that remain after the stimulation/ketamine treatment.       While we are waiting to implement the recommendations you and I have discussed, you should know what to do if your symptoms worsen. A variety of crisis numbers/resources are available. They include:    CRISIS GENERAL NUMBERS   6-572-INANAVT (1-172.773.4461)  OR  911     CRISIS INTERVENTION TEAM (CIT) - this is a POLICE UNIT, specially trained.  This website has information for all of [place]'s CITs. Lays out which areas have this team.  [email or web address]  However, one can just call 911 and ask for this special team.   If police need to be called,  DO ask for this team.    CRISIS MOBILE TEAMS  [and see end of this phrase*]  [facility] -COPE: 24hrs/7days:    [phone]  (Adults > 17yo)    [phone]  (Child   < 16yo)                                       FRONT DOOR (during the day could call)   [phone]    [facility] -[phone] (Adult)  [phone] (Child)     [facility] -[phone] (Adult and Child)     [facility] -[phone] ([employer] limited mobile crisis team; Adult and Child; 24hr)     [facility] -[phone] (Adult and Child)     CRISIS HOUSING  [facility]                           [address]              [phone]  [facility]                                [address]                       [phone]  [facility]                               [address]     [phone]   [facility]  [address]                [phone]    [place] EMERGENCY NUMBERS    Crisis Connection:                                [phone]  [facility]:     [phone]  Crisis Intervention:                                [phone] or [phone]   COPE: Mobile Team 24hrs/7days:    [phone]  (Adults > 17yo)                                                                            [phone]  (Child   < 16yo)  [facility]        [phone] 24/7 line and Mobile Team   [address] [place] [address]  DROP IN:  M-F: 8:00 am - 7:00 pm  Sat: 11:00 am - 3:00 pm   Sun: Closed     WALK-IN COUNSELING:  [facility]       [phone]    [address]:   M, W, F:  1:00-3:00PM    M-Th:  6:30-8:30PM    TRANS and LGBT  Call [facility] at [phone]. This service is staffed by trans people 24/7.   LGBT youth <24 contemplating suicide, call the [employer]  "[phone].    POISON CONTROL CENTER  [phone]    OR  go to nearest ER    CHILD  \"[facility] has a needs assessment team who will do an intake evaluation. Based on the results of the intake they will recommended inpatient admission, partial hospitalization, intensive outpatient or outpatient care. The number is [phone]. \"    CRISIS TEXT LINE  Http://www.crisistextline.org  FREE SUPPORT AT YOUR FINGERTIPS,   Crisis Text Line serves anyone, in any type of crisis, providing access to free,  support and information via the medium people already use and trust: text. Here s how it works:  1)  Text [phone] from anywhere in the [place], anytime, about any type of crisis.  2)  A live, trained Crisis Counselor receives the text and responds quickly.      The volunteer Crisis Counselor will help you move from a 'hot moment to a cool moment'    [facility] EMERGENCY NUMBERS    Crisis Connection:                                [phone]  [facility]:              [phone]  Crisis Intervention:                                [phone] or [phone]   COPE: Mobile Team 24hrs/7days:    [phone]  (Adults > 19yo)                                                                            [phone]  (Child   < 18yo)  [facility]        [phone]  CALL 24 hours a day.  [address] [place] [address]  DROP IN:  M-F: 8:00 am - 7:00 pm  Sat: 11:00 am - 3:00 pm   Sun: Closed    WALK-IN COUNSELIN)  [facility]                                  [phone]        [address]:                  [place], W:      5:00-7:00PM       2)  [facility]                            [phone]        [place], [address]                T, Th:      6:00-8:00PM    TRANS and LGBT  Call [employer] at [phone]. This service is staffed by trans people .   LGBT youth <24 contemplating suicide, call the [facility] " "Lifeline 6-475-3887.    POISON CONTROL CENTER  [phone]    OR  go to nearest ER    CHILD  \"[facility] has a needs assessment team who will do an intake evaluation. Based on the results of the intake they will recommended inpatient admission, partial hospitalization, intensive outpatient or outpatient care. The number is [phone] or [phone]. \"    CRISIS TEXT LINE  Http://www.crisistextline.org  FREE SUPPORT AT YOUR FINGERTIPS, 24/7  Crisis Text Line serves anyone, in any type of crisis, providing access to free, 24/7 support and information via the medium people already use and trust: text. Here s how it works:  1)  Text [phone] from anywhere in the [place], anytime, about any type of crisis.  2)  A live, trained Crisis Counselor receives the text and responds quickly.      The volunteer Crisis Counselor will help you move from a 'hot moment to a cool moment'      * A Community Paramedic (CP) is an advanced paramedic that works to increase access to primary and preventive care and decrease use of emergency departments, which in turn decreases health care costs. Among other things, CPs may play a key role in providing follow-up services after a hospital discharge to prevent hospital readmission. CPs can provide health assessments, chronic disease monitoring and education, medication management, immunizations and vaccinations, laboratory specimen collection, hospital discharge follow-up care and minor medical procedures. CPs work under the direction of an Ambulance Medical Director.    "

## 2025-06-14 DIAGNOSIS — F33.1 MAJOR DEPRESSIVE DISORDER, RECURRENT EPISODE, MODERATE (H): Primary | ICD-10-CM

## 2025-06-17 RX ORDER — DULOXETIN HYDROCHLORIDE 60 MG/1
60 CAPSULE, DELAYED RELEASE ORAL DAILY
Qty: 30 CAPSULE | Refills: 2 | Status: SHIPPED | OUTPATIENT
Start: 2025-06-17

## 2025-06-17 NOTE — TELEPHONE ENCOUNTER
DULoxetine (CYMBALTA) 60 MG capsule 30 capsule 2 5/1/2025 -- No   Sig - Route: Take 1 capsule (60 mg) by mouth daily. - Oral     Class: E-Prescribe   Order: [id number]   E-Prescribing Status: Receipt confirmed by pharmacy (5/1/2025 10:12 AM CDT)     [facility] [address] [facility] - [place], [place] - [address], [id number]     Refills should be on file. Called pharmacy - they did not receive new script from 5/1/25 - resent as Fulfilling Signed Order.